# Patient Record
Sex: MALE | Race: BLACK OR AFRICAN AMERICAN | Employment: UNEMPLOYED | URBAN - METROPOLITAN AREA
[De-identification: names, ages, dates, MRNs, and addresses within clinical notes are randomized per-mention and may not be internally consistent; named-entity substitution may affect disease eponyms.]

---

## 2017-08-21 ENCOUNTER — APPOINTMENT (OUTPATIENT)
Dept: GENERAL RADIOLOGY | Age: 62
End: 2017-08-21
Attending: EMERGENCY MEDICINE
Payer: MEDICARE

## 2017-08-21 ENCOUNTER — HOSPITAL ENCOUNTER (EMERGENCY)
Age: 62
Discharge: HOME | End: 2017-08-21
Attending: EMERGENCY MEDICINE | Admitting: EMERGENCY MEDICINE
Payer: MEDICARE

## 2017-08-21 VITALS
DIASTOLIC BLOOD PRESSURE: 78 MMHG | TEMPERATURE: 97.8 F | HEART RATE: 88 BPM | SYSTOLIC BLOOD PRESSURE: 119 MMHG | RESPIRATION RATE: 22 BRPM | OXYGEN SATURATION: 99 % | WEIGHT: 315 LBS | BODY MASS INDEX: 52.48 KG/M2 | HEIGHT: 65 IN

## 2017-08-21 DIAGNOSIS — J40 BRONCHITIS: ICD-10-CM

## 2017-08-21 DIAGNOSIS — R55 SYNCOPE AND COLLAPSE: ICD-10-CM

## 2017-08-21 DIAGNOSIS — R42 DIZZINESS: Primary | ICD-10-CM

## 2017-08-21 LAB
ALBUMIN SERPL-MCNC: 2.8 G/DL (ref 3.5–5)
ALBUMIN/GLOB SERPL: 0.7 {RATIO} (ref 1.1–2.2)
ALP SERPL-CCNC: 133 U/L (ref 45–117)
ALT SERPL-CCNC: 36 U/L (ref 12–78)
ANION GAP SERPL CALC-SCNC: 3 MMOL/L (ref 5–15)
APPEARANCE UR: CLEAR
AST SERPL-CCNC: 19 U/L (ref 15–37)
ATRIAL RATE: 75 BPM
BACTERIA URNS QL MICRO: NEGATIVE /HPF
BASOPHILS # BLD: 0.1 K/UL (ref 0–0.1)
BASOPHILS NFR BLD: 1 % (ref 0–1)
BILIRUB SERPL-MCNC: 0.3 MG/DL (ref 0.2–1)
BILIRUB UR QL: NEGATIVE
BNP SERPL-MCNC: 114 PG/ML (ref 0–125)
BUN SERPL-MCNC: 16 MG/DL (ref 6–20)
BUN/CREAT SERPL: 20 (ref 12–20)
CALCIUM SERPL-MCNC: 8.4 MG/DL (ref 8.5–10.1)
CALCULATED P AXIS, ECG09: 4 DEGREES
CALCULATED R AXIS, ECG10: 18 DEGREES
CALCULATED T AXIS, ECG11: 41 DEGREES
CHLORIDE SERPL-SCNC: 104 MMOL/L (ref 97–108)
CK SERPL-CCNC: 58 U/L (ref 39–308)
CO2 SERPL-SCNC: 31 MMOL/L (ref 21–32)
COLOR UR: ABNORMAL
CREAT SERPL-MCNC: 0.82 MG/DL (ref 0.7–1.3)
D DIMER PPP FEU-MCNC: 0.41 MG/L FEU (ref 0–0.65)
DIAGNOSIS, 93000: NORMAL
DIFFERENTIAL METHOD BLD: ABNORMAL
EOSINOPHIL # BLD: 0.1 K/UL (ref 0–0.4)
EOSINOPHIL NFR BLD: 1 % (ref 0–7)
EPITH CASTS URNS QL MICRO: ABNORMAL /LPF
ERYTHROCYTE [DISTWIDTH] IN BLOOD BY AUTOMATED COUNT: 16.5 % (ref 11.5–14.5)
GLOBULIN SER CALC-MCNC: 4.2 G/DL (ref 2–4)
GLUCOSE SERPL-MCNC: 214 MG/DL (ref 65–100)
GLUCOSE UR STRIP.AUTO-MCNC: NEGATIVE MG/DL
HCT VFR BLD AUTO: 40.1 % (ref 36.6–50.3)
HGB BLD-MCNC: 12.7 G/DL (ref 12.1–17)
HGB UR QL STRIP: NEGATIVE
HYALINE CASTS URNS QL MICRO: ABNORMAL /LPF (ref 0–5)
INR PPP: 1 (ref 0.9–1.1)
KETONES UR QL STRIP.AUTO: NEGATIVE MG/DL
LEUKOCYTE ESTERASE UR QL STRIP.AUTO: NEGATIVE
LYMPHOCYTES # BLD: 2.2 K/UL (ref 0.8–3.5)
LYMPHOCYTES NFR BLD: 27 % (ref 12–49)
MCH RBC QN AUTO: 23 PG (ref 26–34)
MCHC RBC AUTO-ENTMCNC: 31.7 G/DL (ref 30–36.5)
MCV RBC AUTO: 72.5 FL (ref 80–99)
MONOCYTES # BLD: 0.6 K/UL (ref 0–1)
MONOCYTES NFR BLD: 7 % (ref 5–13)
NEUTS SEG # BLD: 5 K/UL (ref 1.8–8)
NEUTS SEG NFR BLD: 64 % (ref 32–75)
NITRITE UR QL STRIP.AUTO: NEGATIVE
P-R INTERVAL, ECG05: 172 MS
PH UR STRIP: 5 [PH] (ref 5–8)
PLATELET # BLD AUTO: 262 K/UL (ref 150–400)
POTASSIUM SERPL-SCNC: 3.9 MMOL/L (ref 3.5–5.1)
PROT SERPL-MCNC: 7 G/DL (ref 6.4–8.2)
PROT UR STRIP-MCNC: ABNORMAL MG/DL
PROTHROMBIN TIME: 10.3 SEC (ref 9–11.1)
Q-T INTERVAL, ECG07: 378 MS
QRS DURATION, ECG06: 92 MS
QTC CALCULATION (BEZET), ECG08: 422 MS
RBC # BLD AUTO: 5.53 M/UL (ref 4.1–5.7)
RBC #/AREA URNS HPF: ABNORMAL /HPF (ref 0–5)
RBC MORPH BLD: ABNORMAL
RBC MORPH BLD: ABNORMAL
SODIUM SERPL-SCNC: 138 MMOL/L (ref 136–145)
SP GR UR REFRACTOMETRY: 1.03 (ref 1–1.03)
TROPONIN I SERPL-MCNC: <0.04 NG/ML
UROBILINOGEN UR QL STRIP.AUTO: 1 EU/DL (ref 0.2–1)
VENTRICULAR RATE, ECG03: 75 BPM
WBC # BLD AUTO: 8 K/UL (ref 4.1–11.1)
WBC URNS QL MICRO: ABNORMAL /HPF (ref 0–4)

## 2017-08-21 PROCEDURE — 96360 HYDRATION IV INFUSION INIT: CPT

## 2017-08-21 PROCEDURE — 93005 ELECTROCARDIOGRAM TRACING: CPT

## 2017-08-21 PROCEDURE — 82550 ASSAY OF CK (CPK): CPT | Performed by: EMERGENCY MEDICINE

## 2017-08-21 PROCEDURE — 36415 COLL VENOUS BLD VENIPUNCTURE: CPT | Performed by: EMERGENCY MEDICINE

## 2017-08-21 PROCEDURE — 74011250636 HC RX REV CODE- 250/636: Performed by: EMERGENCY MEDICINE

## 2017-08-21 PROCEDURE — 85025 COMPLETE CBC W/AUTO DIFF WBC: CPT | Performed by: EMERGENCY MEDICINE

## 2017-08-21 PROCEDURE — 85610 PROTHROMBIN TIME: CPT | Performed by: EMERGENCY MEDICINE

## 2017-08-21 PROCEDURE — 99285 EMERGENCY DEPT VISIT HI MDM: CPT

## 2017-08-21 PROCEDURE — 84484 ASSAY OF TROPONIN QUANT: CPT | Performed by: EMERGENCY MEDICINE

## 2017-08-21 PROCEDURE — 83880 ASSAY OF NATRIURETIC PEPTIDE: CPT | Performed by: EMERGENCY MEDICINE

## 2017-08-21 PROCEDURE — 71010 XR CHEST PORT: CPT

## 2017-08-21 PROCEDURE — 85379 FIBRIN DEGRADATION QUANT: CPT | Performed by: EMERGENCY MEDICINE

## 2017-08-21 PROCEDURE — 80053 COMPREHEN METABOLIC PANEL: CPT | Performed by: EMERGENCY MEDICINE

## 2017-08-21 PROCEDURE — 81001 URINALYSIS AUTO W/SCOPE: CPT | Performed by: EMERGENCY MEDICINE

## 2017-08-21 RX ORDER — HYDROCODONE POLISTIREX AND CHLORPHENIRAMINE POLISTIREX 10; 8 MG/5ML; MG/5ML
5 SUSPENSION, EXTENDED RELEASE ORAL
Qty: 60 ML | Refills: 0 | Status: SHIPPED | OUTPATIENT
Start: 2017-08-21 | End: 2018-01-12

## 2017-08-21 RX ORDER — FUROSEMIDE 10 MG/ML
40 INJECTION INTRAMUSCULAR; INTRAVENOUS
Status: DISCONTINUED | OUTPATIENT
Start: 2017-08-21 | End: 2017-08-21

## 2017-08-21 RX ORDER — ALBUTEROL SULFATE 90 UG/1
2 AEROSOL, METERED RESPIRATORY (INHALATION)
Qty: 1 INHALER | Refills: 0 | Status: SHIPPED | OUTPATIENT
Start: 2017-08-21 | End: 2018-01-25

## 2017-08-21 RX ADMIN — SODIUM CHLORIDE 1000 ML: 900 INJECTION, SOLUTION INTRAVENOUS at 07:02

## 2017-08-21 NOTE — ED NOTES
Assumed care of pt. Bedside rounding completed, with white boards updated with correct information. Pt resting in bed. Call bell within reach. Pt VS within normal limits. Kwabena RN gave report. Updated on patient on need of urine sample. Pt provided urinal and socks. No complaints at this time.

## 2017-08-21 NOTE — ED NOTES
Pt reports he takes oxybutynin at home to prevent urinating all night and thinks that medication is preventing him from providing urinae specimen at this time.

## 2017-08-21 NOTE — ED NOTES
MD Luis Cotrez has reviewed discharge instructions with the patient. The patient verbalized understanding. Pt discharged with written instructions. No further concerns at this time. IV removed.

## 2017-08-21 NOTE — DISCHARGE INSTRUCTIONS
Bronchitis: Care Instructions  Your Care Instructions    Bronchitis is inflammation of the bronchial tubes, which carry air to the lungs. The tubes swell and produce mucus, or phlegm. The mucus and inflamed bronchial tubes make you cough. You may have trouble breathing. Most cases of bronchitis are caused by viruses like those that cause colds. Antibiotics usually do not help and they may be harmful. Bronchitis usually develops rapidly and lasts about 2 to 3 weeks in otherwise healthy people. Follow-up care is a key part of your treatment and safety. Be sure to make and go to all appointments, and call your doctor if you are having problems. It's also a good idea to know your test results and keep a list of the medicines you take. How can you care for yourself at home? · Take all medicines exactly as prescribed. Call your doctor if you think you are having a problem with your medicine. · Get some extra rest.  · Take an over-the-counter pain medicine, such as acetaminophen (Tylenol), ibuprofen (Advil, Motrin), or naproxen (Aleve) to reduce fever and relieve body aches. Read and follow all instructions on the label. · Do not take two or more pain medicines at the same time unless the doctor told you to. Many pain medicines have acetaminophen, which is Tylenol. Too much acetaminophen (Tylenol) can be harmful. · Take an over-the-counter cough medicine that contains dextromethorphan to help quiet a dry, hacking cough so that you can sleep. Avoid cough medicines that have more than one active ingredient. Read and follow all instructions on the label. · Breathe moist air from a humidifier, hot shower, or sink filled with hot water. The heat and moisture will thin mucus so you can cough it out. · Do not smoke. Smoking can make bronchitis worse. If you need help quitting, talk to your doctor about stop-smoking programs and medicines. These can increase your chances of quitting for good.   When should you call for help? Call 911 anytime you think you may need emergency care. For example, call if:  · You have severe trouble breathing. Call your doctor now or seek immediate medical care if:  · You have new or worse trouble breathing. · You cough up dark brown or bloody mucus (sputum). · You have a new or higher fever. · You have a new rash. Watch closely for changes in your health, and be sure to contact your doctor if:  · You cough more deeply or more often, especially if you notice more mucus or a change in the color of your mucus. · You are not getting better as expected. Where can you learn more? Go to http://dean-sergio.info/. Enter H333 in the search box to learn more about \"Bronchitis: Care Instructions. \"  Current as of: March 25, 2017  Content Version: 11.3  © 2162-6584 [employer]. Care instructions adapted under license by [employer] (which disclaims liability or warranty for this information). If you have questions about a medical condition or this instruction, always ask your healthcare professional. [place] [address] any warranty or liability for your use of this information. Dizziness: Care Instructions  Your Care Instructions  Dizziness is the feeling of unsteadiness or fuzziness in your head. It is different than having vertigo, which is a feeling that the room is spinning or that you are moving or falling. It is also different from lightheadedness, which is the feeling that you are about to faint. It can be hard to know what causes dizziness. Some people feel dizzy when they have migraine headaches. Sometimes bouts of flu can make you feel dizzy. Some medical conditions, such as heart problems or high blood pressure, can make you feel dizzy. Many medicines can cause dizziness, including medicines for high blood pressure, pain, or anxiety.   If a medicine causes your symptoms, your doctor may recommend that you stop or change the medicine. If it is a problem with your heart, you may need medicine to help your heart work better. If there is no clear reason for your symptoms, your doctor may suggest watching and waiting for a while to see if the dizziness goes away on its own. Follow-up care is a key part of your treatment and safety. Be sure to make and go to all appointments, and call your doctor if you are having problems. It's also a good idea to know your test results and keep a list of the medicines you take. How can you care for yourself at home? · If your doctor recommends or prescribes medicine, take it exactly as directed. Call your doctor if you think you are having a problem with your medicine. · Do not drive while you feel dizzy. · Try to prevent falls. Steps you can take include:  ¨ Using nonskid mats, adding grab bars near the tub, and using night-lights. ¨ Clearing your home so that walkways are free of anything you might trip on. ¨ Letting family and friends know that you have been feeling dizzy. This will help them know how to help you. When should you call for help? Call 911 anytime you think you may need emergency care. For example, call if:  · You passed out (lost consciousness). · You have dizziness along with symptoms of a heart attack. These may include:  ¨ Chest pain or pressure, or a strange feeling in the chest.  ¨ Sweating. ¨ Shortness of breath. ¨ Nausea or vomiting. ¨ Pain, pressure, or a strange feeling in the back, neck, jaw, or upper belly or in one or both shoulders or arms. ¨ Lightheadedness or sudden weakness. ¨ A fast or irregular heartbeat. · You have symptoms of a stroke. These may include:  ¨ Sudden numbness, tingling, weakness, or loss of movement in your face, arm, or leg, especially on only one side of your body. ¨ Sudden vision changes. ¨ Sudden trouble speaking. ¨ Sudden confusion or trouble understanding simple statements. ¨ Sudden problems with walking or balance.   ¨ A sudden, severe headache that is different from past headaches. Call your doctor now or seek immediate medical care if:  · You feel dizzy and have a fever, headache, or ringing in your ears. · You have new or increased nausea and vomiting. · Your dizziness does not go away or comes back. Watch closely for changes in your health, and be sure to contact your doctor if:  · You do not get better as expected. Where can you learn more? Go to http://dean-sergio.info/. Enter R109 in the search box to learn more about \"Dizziness: Care Instructions. \"  Current as of: March 20, 2017  Content Version: 11.3  © 9649-4047 [employer]. Care instructions adapted under license by [employer] (which disclaims liability or warranty for this information). If you have questions about a medical condition or this instruction, always ask your healthcare professional. [address] any warranty or liability for your use of this information.

## 2017-08-21 NOTE — ED PROVIDER NOTES
HPI Comments: Marko Springer is a 63 y.o. male, pmhx HTN / TIA / DM / A-fib , who presents via EMS to the ED c/o progressively worsening SOB and dyspnea on exertion since yesterday. Pt reports additional mid-sternal, non-radiating CP and cough. He states his sxs are similar with his hx of bronchitis. Pt states he recently finished a Z-pack with no relief of sxs. He further notes having multiple syncopal episodes yesterday. Pt states he hit his head during one of his falls. He notes using 3L home O2 at baseline. Pt denies any recent long distance travel. He specifically denies any recent fever, chills, nausea, vomiting, diarrhea, abd pain, lightheadedness, dizziness, numbness, weakness, tingling, BLE swelling, HA, heart palpitations, urinary sxs, changes in BM, changes in PO intake, melena, hematochezia, or congestion. PCP: Chyna Kaiser MD  Cardiology: Jeanmarie Sidhu    PMHx: Significant for HTN, TIA, seizures, A-fib, syncope, DM, GERD, depression  PSHx: Significant for gastric bypass, orthopedic surgery, cardiac catheterization  Social Hx: -tobacco (former 1998), +EtOH (occasionally), -Illicit Drugs     There are no other complaints, changes, or physical findings at this time. The history is provided by the patient and the EMS personnel.         Past Medical History:   Diagnosis Date    Acquired lactose intolerance 12/1/2010    Atrial fibrillation ([address] 12/1/2010    Atrial septal aneurysm 3/12/2011    Depression     Diabetes ([address] 10/26/01    LOPEZ (dyspnea on exertion) 10/26/01    Essential hypertension     GERD (gastroesophageal reflux disease) 10/26/01    Headache(784.0) 10/26/01    Hypertension     IHSS (idiopathic hypertrophic subaortic stenosis) ([address] 12/1/2010    Morbid obesity ([address] 10/26/01    Other ill-defined conditions(799.89)     sleep apnea    Other ill-defined conditions(799.89)     migraines    Pneumonia     Psychiatric disorder     Depression since 2005    Secondary seizure disorder ([facility] 12/1/2010    Seizures ([facility]     since 2005    Sleep apnea 10/26/01    uses C pap    Stroke [facility])     TIA 2004    Syncope 2/7/2011    asystole (long) when getting Picc line at [facility], was admitted for pneumonia       Past Surgical History:   Procedure Laterality Date    ABDOMEN SURGERY PROC UNLISTED      GASTRIC BYPASS,OBESE<150CM TODD-EN-Y  1/7/02    Dr Benita Luevano    HX CARPAL TUNNEL RELEASE      HX GI      lap gastric bypass 1/7/02    HX GYN      cyst    HX ORTHOPAEDIC      carpal tunnel    HX OTHER SURGICAL      cyst removed from groin         Family History:   Problem Relation Age of Onset    Hypertension Mother     Heart Disease Mother     Stroke Mother     Cancer Mother     Sickle Cell Trait Sister     Hypertension Sister     Bleeding Prob Sister     Diabetes Father     Hypertension Father     Hypertension Brother        Social History     Social History    Marital status:      Spouse name: N/A    Number of children: N/A    Years of education: N/A     Occupational History    Not on file. Social History Main Topics    Smoking status: Former Smoker    Smokeless tobacco: Never Used    Alcohol use Yes      Comment: occas    Drug use: No    Sexual activity: Yes     Partners: Female     Other Topics Concern    Not on file     Social History Narrative         ALLERGIES: Pcn [penicillins]    Review of Systems   Constitutional: Negative for chills and fever. HENT: Negative for congestion, ear pain, rhinorrhea and sore throat. Respiratory: Positive for cough and shortness of breath. Cardiovascular: Positive for chest pain. Negative for palpitations and leg swelling. Gastrointestinal: Negative for abdominal pain, constipation, diarrhea, nausea and vomiting. No melena  No hematochezia   Endocrine: Negative for polyuria. Genitourinary: Negative for dysuria, frequency and hematuria. Neurological: Positive for syncope.  Negative for dizziness, weakness, light-headedness, numbness and headaches. No tingling   All other systems reviewed and are negative. Patient Vitals for the past 12 hrs:   Temp Pulse Resp BP SpO2   08/21/17 0918 - 88 - 119/78 99 %   08/21/17 0800 - 71 22 94/59 98 %   08/21/17 0721 - 84 16 (!) 154/101 99 %   08/21/17 0657 - 89 18 116/80 -   08/21/17 0657 - 88 16 137/90 -   08/21/17 0549 - 87 24 125/89 97 %   08/21/17 0509 97.8 °F (36.6 °C) 86 26 (!) 125/94 94 %       Physical Exam   Nursing note and vitals reviewed.   General appearance - morbidly obese, well appearing, and in no distress  Eyes - pupils equal and reactive, extraocular eye movements intact  ENT - mucous membranes moist, pharynx normal without lesions  Neck - supple, no significant adenopathy; non-tender to palpation  Chest - no wheezes, rhonchorous cough on exam, coarse breath sounds; non-tender to palpation  Heart - normal rate and regular rhythm, S1 and S2 normal, no murmurs noted  Abdomen - soft, nontender, nondistended, no masses or organomegaly  Musculoskeletal - no joint tenderness, deformity or swelling; normal ROM  Extremities - peripheral pulses normal, no pedal edema  Skin - normal coloration and turgor, no rashes  Neurological - alert, oriented x3, normal speech, no focal findings or movement disorder noted  Written by Joy Jose ED Scribe, as dictated by Marcellus Szymanski MD    MDM  Number of Diagnoses or Management Options  Diagnosis management comments:   DDx: COPD, bronchitis, PNA, CHF       Amount and/or Complexity of Data Reviewed  Clinical lab tests: ordered and reviewed  Tests in the radiology section of CPT®: reviewed and ordered  Tests in the medicine section of CPT®: reviewed and ordered  Obtain history from someone other than the patient: yes (EMS)  Review and summarize past medical records: yes  Independent visualization of images, tracings, or specimens: yes    Patient Progress  Patient progress: stable      Procedures    EKG interpretation: (Preliminary) 9016  Rhythm: sinus rhythm and PAC's. Rate (approx.): 75bpm; Axis: normal; Normal WI, QRS, QTc intervals; ST/T wave: no ischemic changes. Written by DENY Woods, as dictated by Pasquale Gongora MD     PROGRESS NOTE:  6:58 AM  Pt reevaluated. Nursing attempted orthostatics with pt; pt noted to drop his BP from 137 to 116 going from a lying to a seated position. Pt c/o dizziness; did not attempt to stand patient. Written by DENY Woodsibe, as dictated by Pasquale Gongora MD    PROGRESS NOTE:  7:15 AM  Pt reevaluated. Pt states Tussionex works best for his cough. Pt informed his INR is not currently therapeutic; recommended pt follow up with his PCP. Written by DENY Woods, as dictated by Pasquale Gongora MD    SIGN OUT:  7:20 AM  Patient's presentation, labs/imaging and plan of care was reviewed with Scar Castellanos MD as part of sign out. They will complete w/u as part of the plan discussed with the patient. Scar Castellanos MD's assistance in completion of this plan is greatly appreciated but it should be noted that I will be the provider of record for this patient. Pasquale Gongora MD     This note is prepared by Phylicia Torres, acting as Scribe for MD Catalina Millan MD  : The scribe's documentation has been prepared under my direction and personally reviewed by me in its entirety. I confirm that the note above accurately reflects all work, treatment, procedures, and medical decision making performed by me. PROGRESS NOTE:  9:15 AM  Spoke with Yolanda Stone RN. She affirms ambulating pt. She states pt reported dizziness but had a steady gait. Pt ambulated with O2 (baseline) with SPO2 98-99%. Will repeat orthostatics. Written by DENY Roseibgustavo, as dictated by Scar Castellanos MD    9:31 AM  Second set of orthostatics resulted negative. Will discharge per Dr. Neris Fritz.       LABORATORY TESTS:  Recent Results (from the past 12 hour(s))   EKG, 12 LEAD, INITIAL    Collection Time: 08/21/17  5:09 AM   Result Value Ref Range    Ventricular Rate 75 BPM    Atrial Rate 75 BPM    P-R Interval 172 ms    QRS Duration 92 ms    Q-T Interval 378 ms    QTC Calculation (Bezet) 422 ms    Calculated P Axis 4 degrees    Calculated R Axis 18 degrees    Calculated T Axis 41 degrees    Diagnosis       Sinus rhythm with premature atrial complexes  Otherwise normal ECG  When compared with ECG of 22-AUG-2014 17:46,  premature atrial complexes are now present  Confirmed by Beatris Vasques ([id number]) on 8/21/2017 8:42:21 AM     CBC WITH AUTOMATED DIFF    Collection Time: 08/21/17  5:14 AM   Result Value Ref Range    WBC 8.0 4.1 - 11.1 K/uL    RBC 5.53 4.10 - 5.70 M/uL    HGB 12.7 12.1 - 17.0 g/dL    HCT 40.1 36.6 - 50.3 %    MCV 72.5 (L) 80.0 - 99.0 FL    MCH 23.0 (L) 26.0 - 34.0 PG    MCHC 31.7 30.0 - 36.5 g/dL    RDW 16.5 (H) 11.5 - 14.5 %    PLATELET 619 247 - 547 K/uL    NEUTROPHILS 64 32 - 75 %    LYMPHOCYTES 27 12 - 49 %    MONOCYTES 7 5 - 13 %    EOSINOPHILS 1 0 - 7 %    BASOPHILS 1 0 - 1 %    ABS. NEUTROPHILS 5.0 1.8 - 8.0 K/UL    ABS. LYMPHOCYTES 2.2 0.8 - 3.5 K/UL    ABS. MONOCYTES 0.6 0.0 - 1.0 K/UL    ABS. EOSINOPHILS 0.1 0.0 - 0.4 K/UL    ABS.  BASOPHILS 0.1 0.0 - 0.1 K/UL    RBC COMMENTS MICROCYTOSIS  1+        RBC COMMENTS HYPOCHROMIA  1+        DF SMEAR SCANNED     PROTHROMBIN TIME + INR    Collection Time: 08/21/17  5:14 AM   Result Value Ref Range    INR 1.0 0.9 - 1.1      Prothrombin time 10.3 9.0 - 52.2 sec   METABOLIC PANEL, COMPREHENSIVE    Collection Time: 08/21/17  5:43 AM   Result Value Ref Range    Sodium 138 136 - 145 mmol/L    Potassium 3.9 3.5 - 5.1 mmol/L    Chloride 104 97 - 108 mmol/L    CO2 31 21 - 32 mmol/L    Anion gap 3 (L) 5 - 15 mmol/L    Glucose 214 (H) 65 - 100 mg/dL    BUN 16 6 - 20 MG/DL    Creatinine 0.82 0.70 - 1.30 MG/DL    BUN/Creatinine ratio 20 12 - 20      GFR est AA >60 >60 ml/min/1.73m2    GFR est non-AA >60 >60 ml/min/1.73m2    Calcium 8.4 (L) 8.5 - 10.1 MG/DL    Bilirubin, total 0.3 0.2 - 1.0 MG/DL    ALT (SGPT) 36 12 - 78 U/L    AST (SGOT) 19 15 - 37 U/L    Alk. phosphatase 133 (H) 45 - 117 U/L    Protein, total 7.0 6.4 - 8.2 g/dL    Albumin 2.8 (L) 3.5 - 5.0 g/dL    Globulin 4.2 (H) 2.0 - 4.0 g/dL    A-G Ratio 0.7 (L) 1.1 - 2.2     TROPONIN I    Collection Time: 08/21/17  5:43 AM   Result Value Ref Range    Troponin-I, Qt. <0.04 <0.05 ng/mL   CK W/ REFLX CKMB    Collection Time: 08/21/17  5:43 AM   Result Value Ref Range    CK 58 39 - 308 U/L   NT-PRO BNP    Collection Time: 08/21/17  5:43 AM   Result Value Ref Range    NT pro- 0 - 125 PG/ML   D DIMER    Collection Time: 08/21/17  5:43 AM   Result Value Ref Range    D-dimer 0.41 0.00 - 0.65 mg/L FEU   URINALYSIS W/ RFLX MICROSCOPIC    Collection Time: 08/21/17  8:56 AM   Result Value Ref Range    Color YELLOW/STRAW      Appearance CLEAR CLEAR      Specific gravity 1.029 1.003 - 1.030      pH (UA) 5.0 5.0 - 8.0      Protein TRACE (A) NEG mg/dL    Glucose NEGATIVE  NEG mg/dL    Ketone NEGATIVE  NEG mg/dL    Bilirubin NEGATIVE  NEG      Blood NEGATIVE  NEG      Urobilinogen 1.0 0.2 - 1.0 EU/dL    Nitrites NEGATIVE  NEG      Leukocyte Esterase NEGATIVE  NEG      WBC 0-4 0 - 4 /hpf    RBC 0-5 0 - 5 /hpf    Epithelial cells FEW FEW /lpf    Bacteria NEGATIVE  NEG /hpf    Hyaline cast 0-2 0 - 5 /lpf       IMAGING RESULTS:     CXR Results  (Last 48 hours)               08/21/17 0542  XR CHEST PORT Final result    Impression:  IMPRESSION:       Mild central pulmonary vascular congestion without overt pulmonary edema. Narrative:  EXAM:  XR CHEST PORT       INDICATION:  Chest pain and shortness of breath for one day. COMPARISON: 6/17/2014       TECHNIQUE: Portable AP upright chest view at 0531 hours       FINDINGS: Cardiac monitoring wires overlie the thorax. The cardiomediastinal   contours are stable.  There is mild central pulmonary vascular congestion. The lungs and pleural spaces are clear. There is no pneumothorax. The bones and   upper abdomen are stable. MEDICATIONS GIVEN:  Medications   sodium chloride 0.9 % bolus infusion 1,000 mL (1,000 mL IntraVENous New Bag 8/21/17 0702)       IMPRESSION:  1. Dizziness    2. Syncope and collapse    3. Bronchitis        PLAN:  1. Current Discharge Medication List      START taking these medications    Details   albuterol (PROVENTIL HFA, VENTOLIN HFA, PROAIR HFA) 90 mcg/actuation inhaler Take 2 Puffs by inhalation every four (4) hours as needed for Wheezing. Qty: 1 Inhaler, Refills: 0      chlorpheniramine-HYDROcodone (TUSSIONEX PENNKINETIC ER) 10-8 mg/5 mL suspension Take 5 mL by mouth every twelve (12) hours as needed for Cough. Max Daily Amount: 10 mL. Qty: 60 mL, Refills: 0           2. Follow-up Information     Follow up With Details Comments Contact Info    [facility] EMERGENCY DEPT  If symptoms worsen [address] [place] [address]  [phone]        Return to ED if worse     DISCHARGE NOTE:  9:50 AM  The patient's results have been reviewed with family and/or caregiver. They verbally convey their understanding and agreement of the patient's signs, symptoms, diagnosis, treatment, and prognosis. They additionally agree to follow up as recommended in the discharge instructions or to return to the Emergency Room should the patient's condition change prior to their follow-up appointment. The family and/or caregiver verbally agrees with the care-plan and all of their questions have been answered. The discharge instructions have also been provided to the them along with educational information regarding the patient's diagnosis and a list of reasons why the patient would want to return to the ER prior to their follow-up appointment should their condition change.     This note is prepared by Ajay Miranda, acting as Scribe for Nazanin Banuelos MD.    Roxie Hicks MD: The scribe's documentation has been prepared under my direction and personally reviewed by me in its entirety. I confirm that the note above accurately reflects all work, treatment, procedures, and medical decision making performed by me. This note will not be viewable in [address]

## 2017-08-21 NOTE — ED NOTES
Pt walked around POD 1/2, complained of dizziness but steady. Pt walked with oxygen which is baseline. Pt SPO2 maintained at 98-99%. Pt in no acute distress.

## 2017-09-27 ENCOUNTER — APPOINTMENT (OUTPATIENT)
Dept: ULTRASOUND IMAGING | Age: 62
End: 2017-09-27
Attending: EMERGENCY MEDICINE
Payer: MEDICARE

## 2017-09-27 ENCOUNTER — HOSPITAL ENCOUNTER (EMERGENCY)
Age: 62
Discharge: HOME | End: 2017-09-27
Attending: EMERGENCY MEDICINE
Payer: MEDICARE

## 2017-09-27 ENCOUNTER — APPOINTMENT (OUTPATIENT)
Dept: GENERAL RADIOLOGY | Age: 62
End: 2017-09-27
Attending: EMERGENCY MEDICINE
Payer: MEDICARE

## 2017-09-27 VITALS
HEART RATE: 81 BPM | WEIGHT: 315 LBS | OXYGEN SATURATION: 96 % | DIASTOLIC BLOOD PRESSURE: 92 MMHG | RESPIRATION RATE: 29 BRPM | TEMPERATURE: 97.9 F | BODY MASS INDEX: 49.44 KG/M2 | HEIGHT: 67 IN | SYSTOLIC BLOOD PRESSURE: 139 MMHG

## 2017-09-27 DIAGNOSIS — K85.90 ACUTE PANCREATITIS, UNSPECIFIED COMPLICATION STATUS, UNSPECIFIED PANCREATITIS TYPE: Primary | ICD-10-CM

## 2017-09-27 DIAGNOSIS — R79.1 SUBTHERAPEUTIC INTERNATIONAL NORMALIZED RATIO (INR): ICD-10-CM

## 2017-09-27 LAB
ALBUMIN SERPL-MCNC: 3.1 G/DL (ref 3.5–5)
ALBUMIN/GLOB SERPL: 0.8 {RATIO} (ref 1.1–2.2)
ALP SERPL-CCNC: 129 U/L (ref 45–117)
ALT SERPL-CCNC: 34 U/L (ref 12–78)
ANION GAP SERPL CALC-SCNC: 7 MMOL/L (ref 5–15)
APTT PPP: 29.6 SEC (ref 22.1–32.5)
AST SERPL-CCNC: 16 U/L (ref 15–37)
ATRIAL RATE: 78 BPM
BASOPHILS # BLD: 0 K/UL (ref 0–0.1)
BASOPHILS NFR BLD: 0 % (ref 0–1)
BILIRUB SERPL-MCNC: 0.4 MG/DL (ref 0.2–1)
BUN SERPL-MCNC: 14 MG/DL (ref 6–20)
BUN/CREAT SERPL: 21 (ref 12–20)
CALCIUM SERPL-MCNC: 8.8 MG/DL (ref 8.5–10.1)
CALCULATED P AXIS, ECG09: 4 DEGREES
CALCULATED R AXIS, ECG10: 7 DEGREES
CALCULATED T AXIS, ECG11: 52 DEGREES
CHLORIDE SERPL-SCNC: 103 MMOL/L (ref 97–108)
CK SERPL-CCNC: 63 U/L (ref 39–308)
CO2 SERPL-SCNC: 27 MMOL/L (ref 21–32)
CREAT SERPL-MCNC: 0.68 MG/DL (ref 0.7–1.3)
D DIMER PPP FEU-MCNC: 0.25 MG/L FEU (ref 0–0.65)
DIAGNOSIS, 93000: NORMAL
EOSINOPHIL # BLD: 0.1 K/UL (ref 0–0.4)
EOSINOPHIL NFR BLD: 1 % (ref 0–7)
ERYTHROCYTE [DISTWIDTH] IN BLOOD BY AUTOMATED COUNT: 15.5 % (ref 11.5–14.5)
GLOBULIN SER CALC-MCNC: 4 G/DL (ref 2–4)
GLUCOSE SERPL-MCNC: 191 MG/DL (ref 65–100)
HCT VFR BLD AUTO: 40.6 % (ref 36.6–50.3)
HGB BLD-MCNC: 12.8 G/DL (ref 12.1–17)
INR PPP: 1 (ref 0.9–1.1)
LIPASE SERPL-CCNC: 1067 U/L (ref 73–393)
LYMPHOCYTES # BLD: 1.9 K/UL (ref 0.8–3.5)
LYMPHOCYTES NFR BLD: 28 % (ref 12–49)
MCH RBC QN AUTO: 22.9 PG (ref 26–34)
MCHC RBC AUTO-ENTMCNC: 31.5 G/DL (ref 30–36.5)
MCV RBC AUTO: 72.5 FL (ref 80–99)
MONOCYTES # BLD: 0.5 K/UL (ref 0–1)
MONOCYTES NFR BLD: 8 % (ref 5–13)
NEUTS SEG # BLD: 4.3 K/UL (ref 1.8–8)
NEUTS SEG NFR BLD: 63 % (ref 32–75)
P-R INTERVAL, ECG05: 178 MS
PLATELET # BLD AUTO: 269 K/UL (ref 150–400)
POTASSIUM SERPL-SCNC: 4.2 MMOL/L (ref 3.5–5.1)
PROT SERPL-MCNC: 7.1 G/DL (ref 6.4–8.2)
PROTHROMBIN TIME: 10.3 SEC (ref 9–11.1)
Q-T INTERVAL, ECG07: 390 MS
QRS DURATION, ECG06: 98 MS
QTC CALCULATION (BEZET), ECG08: 444 MS
RBC # BLD AUTO: 5.6 M/UL (ref 4.1–5.7)
SODIUM SERPL-SCNC: 137 MMOL/L (ref 136–145)
THERAPEUTIC RANGE,PTTT: NORMAL SECS (ref 58–77)
TROPONIN I SERPL-MCNC: <0.04 NG/ML
TROPONIN I SERPL-MCNC: <0.04 NG/ML
VALPROATE SERPL-MCNC: 4 UG/ML (ref 50–100)
VENTRICULAR RATE, ECG03: 78 BPM
WBC # BLD AUTO: 6.7 K/UL (ref 4.1–11.1)

## 2017-09-27 PROCEDURE — 83690 ASSAY OF LIPASE: CPT | Performed by: EMERGENCY MEDICINE

## 2017-09-27 PROCEDURE — 36415 COLL VENOUS BLD VENIPUNCTURE: CPT | Performed by: EMERGENCY MEDICINE

## 2017-09-27 PROCEDURE — 93005 ELECTROCARDIOGRAM TRACING: CPT

## 2017-09-27 PROCEDURE — 85025 COMPLETE CBC W/AUTO DIFF WBC: CPT | Performed by: EMERGENCY MEDICINE

## 2017-09-27 PROCEDURE — 71010 XR CHEST PORT: CPT

## 2017-09-27 PROCEDURE — 96374 THER/PROPH/DIAG INJ IV PUSH: CPT

## 2017-09-27 PROCEDURE — 80053 COMPREHEN METABOLIC PANEL: CPT | Performed by: EMERGENCY MEDICINE

## 2017-09-27 PROCEDURE — 80164 ASSAY DIPROPYLACETIC ACD TOT: CPT | Performed by: EMERGENCY MEDICINE

## 2017-09-27 PROCEDURE — 85379 FIBRIN DEGRADATION QUANT: CPT | Performed by: EMERGENCY MEDICINE

## 2017-09-27 PROCEDURE — 82550 ASSAY OF CK (CPK): CPT | Performed by: EMERGENCY MEDICINE

## 2017-09-27 PROCEDURE — 85730 THROMBOPLASTIN TIME PARTIAL: CPT | Performed by: EMERGENCY MEDICINE

## 2017-09-27 PROCEDURE — 76705 ECHO EXAM OF ABDOMEN: CPT

## 2017-09-27 PROCEDURE — 74011250636 HC RX REV CODE- 250/636: Performed by: EMERGENCY MEDICINE

## 2017-09-27 PROCEDURE — 85610 PROTHROMBIN TIME: CPT | Performed by: EMERGENCY MEDICINE

## 2017-09-27 PROCEDURE — 77010033678 HC OXYGEN DAILY

## 2017-09-27 PROCEDURE — 74011250637 HC RX REV CODE- 250/637: Performed by: EMERGENCY MEDICINE

## 2017-09-27 PROCEDURE — 99285 EMERGENCY DEPT VISIT HI MDM: CPT

## 2017-09-27 PROCEDURE — 84484 ASSAY OF TROPONIN QUANT: CPT | Performed by: EMERGENCY MEDICINE

## 2017-09-27 RX ORDER — RANITIDINE 150 MG/1
150 TABLET, FILM COATED ORAL
Qty: 14 TAB | Refills: 0 | Status: SHIPPED | OUTPATIENT
Start: 2017-09-27 | End: 2017-10-11

## 2017-09-27 RX ORDER — OXYCODONE AND ACETAMINOPHEN 5; 325 MG/1; MG/1
1 TABLET ORAL
Status: COMPLETED | OUTPATIENT
Start: 2017-09-27 | End: 2017-09-27

## 2017-09-27 RX ORDER — OXYCODONE AND ACETAMINOPHEN 5; 325 MG/1; MG/1
1 TABLET ORAL
Qty: 10 TAB | Refills: 0 | Status: SHIPPED | OUTPATIENT
Start: 2017-09-27 | End: 2018-01-25

## 2017-09-27 RX ORDER — MORPHINE SULFATE 2 MG/ML
4 INJECTION, SOLUTION INTRAMUSCULAR; INTRAVENOUS
Status: COMPLETED | OUTPATIENT
Start: 2017-09-27 | End: 2017-09-27

## 2017-09-27 RX ADMIN — MORPHINE SULFATE 4 MG: 2 INJECTION, SOLUTION INTRAMUSCULAR; INTRAVENOUS at 14:45

## 2017-09-27 RX ADMIN — OXYCODONE HYDROCHLORIDE AND ACETAMINOPHEN 1 TABLET: 5; 325 TABLET ORAL at 16:54

## 2017-09-27 NOTE — ED PROVIDER NOTES
HPI Comments: Blu Evans is a 63 y.o. male with PMhx significant for GERD, COPD, seizures, migraines, low INR, and atrial fibrillation who presents via EMS to the ED with cc of sharp chest pain/pressure with a migraine and shortness of breath which began shortly PTA. Pt was at his PCP's office sitting down and speaking to his doctor when his symptoms onset. The chest pain is mid to low sternal and radiates to his back. His left fingers are tingling/numb. The pain is exacerbated with deep breaths. Pt was given nitroglycerin before EMS arrived with no relief. Pt is on oxygen at home. Pt is on Depakote for his migraines, which trigger his seizures. He is also on Coumadin for a fib. He underwent a nuclear stress test 2 months ago. Pt denies recent long travel, recent surgeries, liver disease, recent Viagra use, stent or pacemaker placement. Pt specifically denies diarrhea, fever, cough, congestion, LOC, nausea, or vomiting. Social Hx: former (used to smoke 1 ppd 15 years ago) Tobacco, + (occasional wine) EtOH, - Illicit Drugs    PCP: Sandrita Pradhan MD  Cardiology: Dr. Corey Bailey at [facility]    There are no other complaints, changes or physical findings at this time. The history is provided by the patient. No  was used.       Past Medical History:   Diagnosis Date    Acquired lactose intolerance 12/1/2010    Atrial fibrillation ([address] 12/1/2010    Atrial septal aneurysm 3/12/2011    Depression     Diabetes ([address] 10/26/01    LOPEZ (dyspnea on exertion) 10/26/01    Essential hypertension     GERD (gastroesophageal reflux disease) 10/26/01    Headache 10/26/01    Hypertension     IHSS (idiopathic hypertrophic subaortic stenosis) ([address] 12/1/2010    Morbid obesity ([address] 10/26/01    Other ill-defined conditions     sleep apnea    Other ill-defined conditions     migraines    Pneumonia     Psychiatric disorder     Depression since 2005    Secondary seizure disorder ([address] 12/1/2010    Seizures ([facility]     since 2005    Sleep apnea 10/26/01    uses C pap    Stroke [facility])     TIA 2004    Syncope 2/7/2011    asystole (long) when getting Pic[facility] line at [facility], was admitted for pneumonia     Past Surgical History:   Procedure Laterality Date    ABDOMEN SURGERY PROC UNLISTED      GASTRIC BYPASS,OBESE<150CM TODD-EN-Y  1/7/02    Dr Bashir Bernabe    HX CARPAL TUNNEL RELEASE      HX GI      lap gastric bypass 1/7/02    HX GYN      cyst    HX ORTHOPAEDIC      carpal tunnel    HX OTHER SURGICAL      cyst removed from groin    HX OTHER SURGICAL      hernia repair     Family History:   Problem Relation Age of Onset    Hypertension Mother     Heart Disease Mother     Stroke Mother     Cancer Mother     Diabetes Father     Hypertension Father     Sickle Cell Trait Sister     Hypertension Sister     Bleeding Prob Sister     Hypertension Brother      Social History     Social History    Marital status:      Spouse name: N/A    Number of children: N/A    Years of education: N/A     Occupational History    Not on file. Social History Main Topics    Smoking status: Former Smoker    Smokeless tobacco: Never Used    Alcohol use 1.8 oz/week     3 Glasses of wine per week      Comment: occas    Drug use: No    Sexual activity: Yes     Partners: Female     Other Topics Concern    Not on file     Social History Narrative     ALLERGIES: Pcn [penicillins]    Review of Systems   Constitutional: Negative for chills and fever. HENT: Negative for congestion. Eyes: Negative for visual disturbance. Respiratory: Positive for shortness of breath. Negative for chest tightness. Cardiovascular: Positive for chest pain. Negative for leg swelling. Gastrointestinal: Negative for abdominal pain, nausea and vomiting. Endocrine: Negative for polyuria. Genitourinary: Negative for dysuria and frequency. Musculoskeletal: Negative for myalgias. Skin: Negative for color change. Allergic/Immunologic: Negative for immunocompromised state. Neurological: Positive for numbness (left fingers and leg) and headaches. Vitals:    09/27/17 1722 09/27/17 1725 09/27/17 1726 09/27/17 1730   BP:   144/90 (!) 139/92   Pulse: 87 81 81    Resp: 19 23 21 29   Temp:       SpO2: 96% 98% 98% 96%   Weight:       Height:              Physical Exam   Nursing note and vitals reviewed. General appearance: non-toxic, NAD  Eyes: PERRL, EOMI, conjunctiva normal, anicteric sclera  HEENT: mucous membranes moist, oropharynx is clear  Pulmonary: clear to auscultation bilaterally  Cardiac: normal rate and regular rhythm, no murmurs, gallops, or rubs, 2+DP pulses, 2+ radial pulses  Abdomen: soft, mild epigastric tenderness to palpation, nondistended, bowel sounds present  MSK: no pre-tibial edema, tender to palpation of the left anterior chest wall, reproducible pain with torso rotation  Neuro: Alert, answers questions appropriately, CN II-XII intact, VFF,  symmetric, strength intact all 4 extremities  Skin: capillary refill brisk    MDM  Number of Diagnoses or Management Options  Acute pancreatitis, unspecified complication status, unspecified pancreatitis type:   Subtherapeutic international normalized ratio (INR):   Diagnosis management comments: DDx: Chest wall strain, pleurisy, bronchitis, pneumonia, PE, pancreatitis, gastritis    Lipase elevated, US negative; pt admits to drinking wine. Suspect ETOH-induced. CE negative. No pain with ambulation. Lower suspicion for ACS, especially in light of elevated lipase and location of pain. Advised pt on clear diet & advance as tolerated. Outpatient f/u. Lower suspicion for PE and D dimer negative.         Amount and/or Complexity of Data Reviewed  Clinical lab tests: ordered and reviewed  Tests in the radiology section of CPT®: ordered and reviewed  Tests in the medicine section of CPT®: ordered and reviewed  Review and summarize past medical records: yes  Independent visualization of images, tracings, or specimens: yes    Patient Progress  Patient progress: stable    ED Course     Procedures     EKG interpretation: 12:56  Rhythm: sinus rhythm with marked sinus arrhythmia. Rate (approx.): 78; Axis: normal; P wave: normal; QRS interval: normal ; ST/T wave: no ST elevation, no ST depression;   DE interval 178 ms, QRS 98 ms,  ms, QTc 444 ms. Written by Ravi Razo ED Scribe, as dictated by Emilie Pope MD.    LABORATORY TESTS:  Recent Results (from the past 12 hour(s))   EKG, 12 LEAD, INITIAL    Collection Time: 09/27/17 12:56 PM   Result Value Ref Range    Ventricular Rate 78 BPM    Atrial Rate 78 BPM    P-R Interval 178 ms    QRS Duration 98 ms    Q-T Interval 390 ms    QTC Calculation (Bezet) 444 ms    Calculated P Axis 4 degrees    Calculated R Axis 7 degrees    Calculated T Axis 52 degrees    Diagnosis       Sinus rhythm with marked sinus arrhythmia  Otherwise normal ECG  When compared with ECG of 21-AUG-2017 05:09,  premature atrial complexes are no longer present     CBC WITH AUTOMATED DIFF    Collection Time: 09/27/17  1:06 PM   Result Value Ref Range    WBC 6.7 4.1 - 11.1 K/uL    RBC 5.60 4.10 - 5.70 M/uL    HGB 12.8 12.1 - 17.0 g/dL    HCT 40.6 36.6 - 50.3 %    MCV 72.5 (L) 80.0 - 99.0 FL    MCH 22.9 (L) 26.0 - 34.0 PG    MCHC 31.5 30.0 - 36.5 g/dL    RDW 15.5 (H) 11.5 - 14.5 %    PLATELET 682 116 - 487 K/uL    NEUTROPHILS 63 32 - 75 %    LYMPHOCYTES 28 12 - 49 %    MONOCYTES 8 5 - 13 %    EOSINOPHILS 1 0 - 7 %    BASOPHILS 0 0 - 1 %    ABS. NEUTROPHILS 4.3 1.8 - 8.0 K/UL    ABS. LYMPHOCYTES 1.9 0.8 - 3.5 K/UL    ABS. MONOCYTES 0.5 0.0 - 1.0 K/UL    ABS. EOSINOPHILS 0.1 0.0 - 0.4 K/UL    ABS.  BASOPHILS 0.0 0.0 - 0.1 K/UL   METABOLIC PANEL, COMPREHENSIVE    Collection Time: 09/27/17  1:06 PM   Result Value Ref Range    Sodium 137 136 - 145 mmol/L    Potassium 4.2 3.5 - 5.1 mmol/L    Chloride 103 97 - 108 mmol/L    CO2 27 21 - 32 mmol/L Anion gap 7 5 - 15 mmol/L    Glucose 191 (H) 65 - 100 mg/dL    BUN 14 6 - 20 MG/DL    Creatinine 0.68 (L) 0.70 - 1.30 MG/DL    BUN/Creatinine ratio 21 (H) 12 - 20      GFR est AA >60 >60 ml/min/1.73m2    GFR est non-AA >60 >60 ml/min/1.73m2    Calcium 8.8 8.5 - 10.1 MG/DL    Bilirubin, total 0.4 0.2 - 1.0 MG/DL    ALT (SGPT) 34 12 - 78 U/L    AST (SGOT) 16 15 - 37 U/L    Alk. phosphatase 129 (H) 45 - 117 U/L    Protein, total 7.1 6.4 - 8.2 g/dL    Albumin 3.1 (L) 3.5 - 5.0 g/dL    Globulin 4.0 2.0 - 4.0 g/dL    A-G Ratio 0.8 (L) 1.1 - 2.2     CK W/ REFLX CKMB    Collection Time: 09/27/17  1:06 PM   Result Value Ref Range    CK 63 39 - 308 U/L   PROTHROMBIN TIME + INR    Collection Time: 09/27/17  1:06 PM   Result Value Ref Range    INR 1.0 0.9 - 1.1      Prothrombin time 10.3 9.0 - 11.1 sec   PTT    Collection Time: 09/27/17  1:06 PM   Result Value Ref Range    aPTT 29.6 22.1 - 32.5 sec    aPTT, therapeutic range     58.0 - 77.0 SECS   TROPONIN I    Collection Time: 09/27/17  1:06 PM   Result Value Ref Range    Troponin-I, Qt. <0.04 <0.05 ng/mL   LIPASE    Collection Time: 09/27/17  1:06 PM   Result Value Ref Range    Lipase 1067 (H) 73 - 393 U/L   D DIMER    Collection Time: 09/27/17  1:06 PM   Result Value Ref Range    D-dimer 0.25 0.00 - 0.65 mg/L FEU   VALPROIC ACID    Collection Time: 09/27/17  2:33 PM   Result Value Ref Range    Valproic acid 4 (L) 50 - 100 ug/ml   TROPONIN I    Collection Time: 09/27/17  4:36 PM   Result Value Ref Range    Troponin-I, Qt. <0.04 <0.05 ng/mL     IMAGING RESULTS:    ULTRASOUND OF THE RIGHT UPPER QUADRANT     INDICATION: pancreatitis, chest/abd pain, eval gallbladder     COMPARISON: None.     TECHNIQUE:  Sonography of the right upper quadrant was performed.      FINDINGS:     GALLBLADDER: No gallstones, wall thickening, or pericholecystic fluid. COMMON DUCT: 0. 7 cm in diameter. The duct is upper normal caliber. LIVER: Normal echogenicity.  No focal hepatic mass or intrahepatic biliary  dilatation is shown. PANCREAS: The visualized portions of the pancreas are normal.   RIGHT KIDNEY: 11.8 cm in length. No hydronephrosis, shadowing calculus, or  contour-deforming renal mass.        IMPRESSION  IMPRESSION:   Unremarkable right upper quadrant ultrasound.         CXR Results  (Last 48 hours)               09/27/17 1347  XR CHEST PORT Final result    Impression:  IMPRESSION: Mild central congestion               Narrative:  EXAM:  XR CHEST PORT       INDICATION:  chest pain that radiated to the back, beginning today. COMPARISON:  8/21/2017       FINDINGS: A portable AP radiograph of the chest was obtained at 1335 hours. The   patient is on a cardiac monitor. There is a mild central congestion. The   cardiac and mediastinal contours and pulmonary vascularity are normal.  The   bones and soft tissues are grossly within normal limits. MEDICATIONS GIVEN:  Medications   morphine injection 4 mg (4 mg IntraVENous Given 9/27/17 1445)   oxyCODONE-acetaminophen (PERCOCET) 5-325 mg per tablet 1 Tab (1 Tab Oral Given 9/27/17 1654)     IMPRESSION:  1. Acute pancreatitis, unspecified complication status, unspecified pancreatitis type    2. Subtherapeutic international normalized ratio (INR)      PLAN:  1. Discharge   Discharge Medication List as of 9/27/2017  5:44 PM      START taking these medications    Details   raNITIdine (ZANTAC) 150 mg tablet Take 1 Tab by mouth nightly for 14 days. , Normal, Disp-14 Tab, R-0         CONTINUE these medications which have CHANGED    Details   oxyCODONE-acetaminophen (PERCOCET) 5-325 mg per tablet Take 1 Tab by mouth every six (6) hours as needed for Pain. Max Daily Amount: 4 Tabs.  Indications: causes drowsiness;do not drink,drive, or use machinery while taking, Print, Disp-10 Tab, R-0         CONTINUE these medications which have NOT CHANGED    Details   albuterol (PROVENTIL HFA, VENTOLIN HFA, PROAIR HFA) 90 mcg/actuation inhaler Take 2 Puffs by inhalation every four (4) hours as needed for Wheezing., Print, Disp-1 Inhaler, R-0      chlorpheniramine-HYDROcodone (TUSSIONEX PENNKINETIC ER) 10-8 mg/5 mL suspension Take 5 mL by mouth every twelve (12) hours as needed for Cough. Max Daily Amount: 10 mL., Print, Disp-60 mL, R-0      divalproex DR (DEPAKOTE) 500 mg tablet Take 1 tablet by mouth two (2) times a day., Normal, Disp-90 tablet, R-3      metoprolol (LOPRESSOR) 100 mg tablet Take 1 tablet by mouth two (2) times a day., No Print, Disp-1 tablet, R-0      simethicone (MYLICON) 80 mg chewable tablet Take 1 tablet by mouth four (4) times daily as needed. , Print, Disp-30 tablet, R-0      omeprazole (PRILOSEC) 20 mg capsule Take 1 capsule by mouth daily. , Print, Disp-30 capsule, R-0      aspirin delayed-release 81 mg tablet Take 81 mg by mouth daily. , Historical Med      WARFARIN SODIUM (WARFARIN PO) Take 30 mg by mouth daily. , Historical Med      LISINOPRIL PO Take 20 mg by mouth. Dose Unknown, Historical Med      citalopram (CELEXA) 20 mg tablet Take 0.5 Tabs by mouth daily. , Normal, Disp-30 Tab, R-3      alprazolam (XANAX) 2 mg tablet Take 2 mg by mouth two (2) times a day., Historical Med           2. Follow-up Information     Follow up With Details Comments Contact Info    Yojana Martinez MD Schedule an appointment as soon as possible for a visit in 2 days  [place]  [phone]      [facility] EMERGENCY DEPT Go in 1 day If symptoms worsen [facility] an appointment as soon as possible for a visit in 1 week FOR FOLLOW UP AND EVALUATION         Return to ED if worse     Discharge Note:  5:38 PM  The patient has been re-evaluated and is ready for discharge. Reviewed available results with patient. Counseled patient on diagnosis and care plan. Patient has expressed understanding, and all questions have been answered.  Patient agrees with plan and agrees to follow up as recommended, or return to the ED if their symptoms worsen. Discharge instructions have been provided and explained to the patient, along with reasons to return to the ED. Attestation: This note is prepared by Maxime Bennett, acting as Scribe for MD Meagan Parks MD: The scribe's documentation has been prepared under my direction and personally reviewed by me in its entirety. I confirm that the note above accurately reflects all work, treatment, procedures, and medical decision making performed by me.

## 2017-09-27 NOTE — ED NOTES
.Discharge instructions reviewed with patient and given to pt per MD bishop. Pt able to return/verbalize discharge instructions. Copy of discharge instructions given. RX given to pt. Pt condition stable, no further complaints. Pt out of ER, accompanied by self. Assisted to car via wheelchair by ED staff, pt had own oxygen tank. Family to drive patient home. Patient out of ED prior to obtaining discharge vitals. Belongings & discharge paperwork out of ED with patient.

## 2017-09-27 NOTE — DISCHARGE INSTRUCTIONS
Pancreatitis: Care Instructions  Your Care Instructions    The pancreas is an organ behind the stomach. It makes hormones and enzymes to help your body digest food. But if these enzymes attack the pancreas, it can get inflamed. This is called pancreatitis. Most cases are caused by gallstones or by heavy alcohol use. If you take care of yourself at home, it will help you get better. It will also help you avoid more problems with your pancreas. Follow-up care is a key part of your treatment and safety. Be sure to make and go to all appointments, and call your doctor if you are having problems. It's also a good idea to know your test results and keep a list of the medicines you take. How can you care for yourself at home? · Drink clear liquids and eat bland foods until you feel better. [place] foods include rice, dry toast, and crackers. They also include bananas and applesauce. · Eat a low-fat diet until your doctor says your pancreas is healed. · Do not drink alcohol. Tell your doctor if you need help to quit. Counseling, support groups, and sometimes medicines can help you stay sober. · Be safe with medicines. Read and follow all instructions on the label. ¨ If the doctor gave you a prescription medicine for pain, take it as prescribed. ¨ If you are not taking a prescription pain medicine, ask your doctor if you can take an over-the-counter medicine. · If your doctor prescribed antibiotics, take them as directed. Do not stop taking them just because you feel better. You need to take the full course of antibiotics. · Get extra rest until you feel better. To prevent future problems with your pancreas  · Do not drink alcohol. · Tell your doctors and pharmacist that you've had pancreatitis. They can help you avoid medicines that may cause this problem again. When should you call for help? Call your doctor now or seek immediate medical care if:  · You have new or severe belly pain.   · You have a new or higher fever. · You can't keep fluid or medicines down. Watch closely for changes in your health, and be sure to contact your doctor if:  · The symptoms you had when you first started feeling sick come back. · You do not get better as expected. · You need help to stop drinking alcohol. Where can you learn more? Go to http://dean-sergio.info/. Enter Q547 in the search box to learn more about \"Pancreatitis: Care Instructions. \"  Current as of: August 9, 2016  Content Version: 11.3  © 1350-6324 [employer]. Care instructions adapted under license by [employer] (which disclaims liability or warranty for this information). If you have questions about a medical condition or this instruction, always ask your healthcare professional. [address] any warranty or liability for your use of this information. Chest Pain: Care Instructions  Your Care Instructions  There are many things that can cause chest pain. Some are not serious and will get better on their own in a few days. But some kinds of chest pain need more testing and treatment. Your doctor may have recommended a follow-up visit in the next 8 to 12 hours. If you are not getting better, you may need more tests or treatment. Even though your doctor has released you, you still need to watch for any problems. The doctor carefully checked you, but sometimes problems can develop later. If you have new symptoms or if your symptoms do not get better, get medical care right away. If you have worse or different chest pain or pressure that lasts more than 5 minutes or you passed out (lost consciousness), call 911 or seek other emergency help right away. A medical visit is only one step in your treatment. Even if you feel better, you still need to do what your doctor recommends, such as going to all suggested follow-up appointments and taking medicines exactly as directed.  This will help you recover and help prevent future problems. How can you care for yourself at home? · Rest until you feel better. · Take your medicine exactly as prescribed. Call your doctor if you think you are having a problem with your medicine. · Do not drive after taking a prescription pain medicine. When should you call for help? Call 911 if:  · You passed out (lost consciousness). · You have severe difficulty breathing. · You have symptoms of a heart attack. These may include:  ¨ Chest pain or pressure, or a strange feeling in your chest.  ¨ Sweating. ¨ Shortness of breath. ¨ Nausea or vomiting. ¨ Pain, pressure, or a strange feeling in your back, neck, jaw, or upper belly or in one or both shoulders or arms. ¨ Lightheadedness or sudden weakness. ¨ A fast or irregular heartbeat. After you call 911, the  may tell you to chew 1 adult-strength or 2 to 4 low-dose aspirin. Wait for an ambulance. Do not try to drive yourself. Call your doctor today if:  · You have any trouble breathing. · Your chest pain gets worse. · You are dizzy or lightheaded, or you feel like you may faint. · You are not getting better as expected. · You are having new or different chest pain. Where can you learn more? Go to http://dean-sergio.info/. Enter A120 in the search box to learn more about \"Chest Pain: Care Instructions. \"  Current as of: March 20, 2017  Content Version: 11.3  © 4054-2518 [employer]. Care instructions adapted under license by [employer] (which disclaims liability or warranty for this information). If you have questions about a medical condition or this instruction, always ask your healthcare professional. [address] any warranty or liability for your use of this information.

## 2017-09-27 NOTE — ED NOTES
Bedside and Verbal shift change report given to JESÚS Fitzgerald (oncoming nurse) by Jamir Delaney (offgoing nurse). Report included the following information SBAR, ED Summary, Intake/Output, MAR and Recent Results. Monitor x 0. Call bell in reach. Bed in lowest position, with side rails up x 2. Pt updated on plan of care. Resting at this time. 2L oxygen NC on at this time. Awaiting ride.

## 2017-09-27 NOTE — ED NOTES
Patient arrived by EMS, Patient given SL NTG at the clinic, and 324 asa via EMS. patient was sitting talking to his doctor at a clinic when he started having pain in the center of the chest that radiates to the back. Patient was at his doctor for a checkup today. States he had pneumonia a month ago.

## 2017-12-20 ENCOUNTER — HOSPITAL ENCOUNTER (EMERGENCY)
Age: 62
Discharge: STILL ADMITTED OR ADMINISTRATIVE | End: 2017-12-20
Attending: EMERGENCY MEDICINE
Payer: MEDICARE

## 2017-12-20 ENCOUNTER — HOSPITAL ENCOUNTER (OUTPATIENT)
Dept: GENERAL RADIOLOGY | Age: 62
Discharge: HOME | End: 2017-12-20
Payer: MEDICARE

## 2017-12-20 VITALS — HEART RATE: 98 BPM

## 2017-12-20 DIAGNOSIS — M54.50 CHRONIC LOW BACK PAIN: ICD-10-CM

## 2017-12-20 DIAGNOSIS — G89.29 CHRONIC LOW BACK PAIN: ICD-10-CM

## 2017-12-20 PROCEDURE — 72100 X-RAY EXAM L-S SPINE 2/3 VWS: CPT

## 2017-12-20 PROCEDURE — 75810000275 HC EMERGENCY DEPT VISIT NO LEVEL OF CARE

## 2018-01-12 ENCOUNTER — APPOINTMENT (OUTPATIENT)
Dept: GENERAL RADIOLOGY | Age: 63
End: 2018-01-12
Attending: EMERGENCY MEDICINE
Payer: MEDICARE

## 2018-01-12 ENCOUNTER — HOSPITAL ENCOUNTER (EMERGENCY)
Age: 63
Discharge: HOME | End: 2018-01-12
Attending: EMERGENCY MEDICINE
Payer: MEDICARE

## 2018-01-12 ENCOUNTER — OFFICE VISIT (OUTPATIENT)
Dept: FAMILY MEDICINE CLINIC | Age: 63
End: 2018-01-12

## 2018-01-12 VITALS
TEMPERATURE: 98.1 F | OXYGEN SATURATION: 93 % | HEART RATE: 84 BPM | RESPIRATION RATE: 22 BRPM | WEIGHT: 315 LBS | HEIGHT: 67 IN | SYSTOLIC BLOOD PRESSURE: 142 MMHG | BODY MASS INDEX: 49.44 KG/M2 | DIASTOLIC BLOOD PRESSURE: 85 MMHG

## 2018-01-12 VITALS
BODY MASS INDEX: 49.44 KG/M2 | TEMPERATURE: 97.4 F | SYSTOLIC BLOOD PRESSURE: 129 MMHG | HEART RATE: 89 BPM | RESPIRATION RATE: 18 BRPM | OXYGEN SATURATION: 96 % | WEIGHT: 315 LBS | HEIGHT: 67 IN | DIASTOLIC BLOOD PRESSURE: 89 MMHG

## 2018-01-12 DIAGNOSIS — E11.21 CONTROLLED TYPE 2 DIABETES MELLITUS WITH DIABETIC NEPHROPATHY, WITHOUT LONG-TERM CURRENT USE OF INSULIN (HCC): ICD-10-CM

## 2018-01-12 DIAGNOSIS — R07.9 CHEST PAIN AT REST: ICD-10-CM

## 2018-01-12 DIAGNOSIS — I10 HYPERTENSION, WELL CONTROLLED: ICD-10-CM

## 2018-01-12 DIAGNOSIS — E66.01 MORBID OBESITY (HCC): Primary | ICD-10-CM

## 2018-01-12 DIAGNOSIS — R05.9 COUGH: Primary | ICD-10-CM

## 2018-01-12 PROBLEM — F32.A ANXIETY AND DEPRESSION: Status: ACTIVE | Noted: 2018-01-12

## 2018-01-12 PROBLEM — F41.9 ANXIETY AND DEPRESSION: Status: ACTIVE | Noted: 2018-01-12

## 2018-01-12 PROBLEM — G40.909 SEIZURE DISORDER (HCC): Status: ACTIVE | Noted: 2018-01-12

## 2018-01-12 PROBLEM — F33.9 RECURRENT DEPRESSION (HCC): Status: ACTIVE | Noted: 2018-01-12

## 2018-01-12 LAB
ALBUMIN SERPL-MCNC: 3.3 G/DL (ref 3.5–5)
ALBUMIN/GLOB SERPL: 0.8 {RATIO} (ref 1.1–2.2)
ALP SERPL-CCNC: 162 U/L (ref 45–117)
ALT SERPL-CCNC: 22 U/L (ref 12–78)
ANION GAP SERPL CALC-SCNC: 5 MMOL/L (ref 5–15)
AST SERPL-CCNC: 17 U/L (ref 15–37)
ATRIAL RATE: 84 BPM
BASOPHILS # BLD: 0.1 K/UL (ref 0–0.1)
BASOPHILS NFR BLD: 1 % (ref 0–1)
BILIRUB SERPL-MCNC: 0.4 MG/DL (ref 0.2–1)
BNP SERPL-MCNC: 95 PG/ML (ref 0–125)
BUN SERPL-MCNC: 11 MG/DL (ref 6–20)
BUN/CREAT SERPL: 13 (ref 12–20)
CALCIUM SERPL-MCNC: 9.1 MG/DL (ref 8.5–10.1)
CALCULATED P AXIS, ECG09: 29 DEGREES
CALCULATED R AXIS, ECG10: 49 DEGREES
CALCULATED T AXIS, ECG11: 53 DEGREES
CHLORIDE SERPL-SCNC: 103 MMOL/L (ref 97–108)
CK SERPL-CCNC: 85 U/L (ref 39–308)
CO2 SERPL-SCNC: 29 MMOL/L (ref 21–32)
CREAT SERPL-MCNC: 0.88 MG/DL (ref 0.7–1.3)
DIAGNOSIS, 93000: NORMAL
DIFFERENTIAL METHOD BLD: ABNORMAL
EOSINOPHIL # BLD: 0.1 K/UL (ref 0–0.4)
EOSINOPHIL NFR BLD: 2 % (ref 0–7)
ERYTHROCYTE [DISTWIDTH] IN BLOOD BY AUTOMATED COUNT: 14.9 % (ref 11.5–14.5)
GLOBULIN SER CALC-MCNC: 4.3 G/DL (ref 2–4)
GLUCOSE SERPL-MCNC: 274 MG/DL (ref 65–100)
HCT VFR BLD AUTO: 42 % (ref 36.6–50.3)
HGB BLD-MCNC: 13.6 G/DL (ref 12.1–17)
LYMPHOCYTES # BLD: 1.8 K/UL (ref 0.8–3.5)
LYMPHOCYTES NFR BLD: 30 % (ref 12–49)
MCH RBC QN AUTO: 23 PG (ref 26–34)
MCHC RBC AUTO-ENTMCNC: 32.4 G/DL (ref 30–36.5)
MCV RBC AUTO: 71.1 FL (ref 80–99)
MONOCYTES # BLD: 0.5 K/UL (ref 0–1)
MONOCYTES NFR BLD: 8 % (ref 5–13)
NEUTS SEG # BLD: 3.6 K/UL (ref 1.8–8)
NEUTS SEG NFR BLD: 59 % (ref 32–75)
P-R INTERVAL, ECG05: 164 MS
PLATELET # BLD AUTO: 222 K/UL (ref 150–400)
POTASSIUM SERPL-SCNC: 4 MMOL/L (ref 3.5–5.1)
PROT SERPL-MCNC: 7.6 G/DL (ref 6.4–8.2)
Q-T INTERVAL, ECG07: 388 MS
QRS DURATION, ECG06: 88 MS
QTC CALCULATION (BEZET), ECG08: 458 MS
RBC # BLD AUTO: 5.91 M/UL (ref 4.1–5.7)
RBC MORPH BLD: ABNORMAL
SODIUM SERPL-SCNC: 137 MMOL/L (ref 136–145)
TROPONIN I SERPL-MCNC: <0.04 NG/ML
VENTRICULAR RATE, ECG03: 84 BPM
WBC # BLD AUTO: 6.1 K/UL (ref 4.1–11.1)

## 2018-01-12 PROCEDURE — 84484 ASSAY OF TROPONIN QUANT: CPT | Performed by: EMERGENCY MEDICINE

## 2018-01-12 PROCEDURE — 71046 X-RAY EXAM CHEST 2 VIEWS: CPT

## 2018-01-12 PROCEDURE — 36415 COLL VENOUS BLD VENIPUNCTURE: CPT | Performed by: EMERGENCY MEDICINE

## 2018-01-12 PROCEDURE — 99284 EMERGENCY DEPT VISIT MOD MDM: CPT

## 2018-01-12 PROCEDURE — 93005 ELECTROCARDIOGRAM TRACING: CPT

## 2018-01-12 PROCEDURE — 82550 ASSAY OF CK (CPK): CPT | Performed by: EMERGENCY MEDICINE

## 2018-01-12 PROCEDURE — 80053 COMPREHEN METABOLIC PANEL: CPT | Performed by: EMERGENCY MEDICINE

## 2018-01-12 PROCEDURE — 85025 COMPLETE CBC W/AUTO DIFF WBC: CPT | Performed by: EMERGENCY MEDICINE

## 2018-01-12 PROCEDURE — 83880 ASSAY OF NATRIURETIC PEPTIDE: CPT | Performed by: EMERGENCY MEDICINE

## 2018-01-12 RX ORDER — SODIUM CHLORIDE 0.9 % (FLUSH) 0.9 %
5-10 SYRINGE (ML) INJECTION EVERY 8 HOURS
Status: DISCONTINUED | OUTPATIENT
Start: 2018-01-12 | End: 2018-01-12 | Stop reason: HOSPADM

## 2018-01-12 RX ORDER — SILDENAFIL 100 MG/1
100 TABLET, FILM COATED ORAL AS NEEDED
COMMUNITY
Start: 2017-07-21

## 2018-01-12 RX ORDER — SODIUM CHLORIDE 0.9 % (FLUSH) 0.9 %
5-10 SYRINGE (ML) INJECTION AS NEEDED
Status: DISCONTINUED | OUTPATIENT
Start: 2018-01-12 | End: 2018-01-12 | Stop reason: HOSPADM

## 2018-01-12 RX ORDER — METFORMIN HYDROCHLORIDE 500 MG/1
500 TABLET ORAL 2 TIMES DAILY WITH MEALS
COMMUNITY
Start: 2017-07-21 | End: 2018-07-20

## 2018-01-12 RX ORDER — HYDROCODONE POLISTIREX AND CHLORPHENIRAMINE POLISTIREX 10; 8 MG/5ML; MG/5ML
5 SUSPENSION, EXTENDED RELEASE ORAL
Qty: 60 ML | Refills: 0 | Status: SHIPPED | OUTPATIENT
Start: 2018-01-12 | End: 2018-01-25

## 2018-01-12 NOTE — DISCHARGE INSTRUCTIONS

## 2018-01-12 NOTE — ED PROVIDER NOTES
EMERGENCY DEPARTMENT HISTORY AND PHYSICAL EXAM      Date: 1/12/2018  Patient Name: Jannet Monique II    History of Presenting Illness     Chief Complaint   Patient presents with    Chest Pain (Angina)     diffuse chest pain x two days; sent by PCP for abnormal EKG       History Provided By: Patient    HPI: Jannet Monique II, 63 y.o. male with PMHx significant for HTN and DM, presents ambulatory to the ED with cc of worsening intermittent CP with associated cough and congestion x 2 days. Pt states CP is exacerbated with deep inspiration, cough and exertion. He states CP intermittently radiates across the chest wall. Pt denies taking any medication for symptoms. He reports previously being evaluated by his PCP earlier today where he was referred to the ED. Pt specifically denies any fever, chills, nausea, or vomiting. PCP: Pankaj Clark MD    There are no other complaints, changes, or physical findings at this time. Current Facility-Administered Medications   Medication Dose Route Frequency Provider Last Rate Last Dose    sodium chloride (NS) flush 5-10 mL  5-10 mL IntraVENous Q8H Jose G Arita MD        sodium chloride (NS) flush 5-10 mL  5-10 mL IntraVENous PRN Jose G Arita MD         Current Outpatient Prescriptions   Medication Sig Dispense Refill    chlorpheniramine-HYDROcodone (TUSSIONEX PENNKINETIC ER) 10-8 mg/5 mL suspension Take 5 mL by mouth every twelve (12) hours as needed for Cough. Max Daily Amount: 10 mL. 60 mL 0    metFORMIN (GLUCOPHAGE) 500 mg tablet Take 500 mg by mouth.  sildenafil citrate (VIAGRA) 100 mg tablet Take 100 mg by mouth.  rivaroxaban (XARELTO) 20 mg tab tablet Take  by mouth daily.  oxyCODONE-acetaminophen (PERCOCET) 5-325 mg per tablet Take 1 Tab by mouth every six (6) hours as needed for Pain. Max Daily Amount: 4 Tabs.  Indications: causes drowsiness;do not drink,drive, or use machinery while taking 10 Tab 0    albuterol (PROVENTIL HFA, VENTOLIN HFA, PROAIR HFA) 90 mcg/actuation inhaler Take 2 Puffs by inhalation every four (4) hours as needed for Wheezing. 1 Inhaler 0    divalproex DR (DEPAKOTE) 500 mg tablet Take 1 tablet by mouth two (2) times a day. 90 tablet 3    metoprolol (LOPRESSOR) 100 mg tablet Take 1 tablet by mouth two (2) times a day. 1 tablet 0    simethicone (MYLICON) 80 mg chewable tablet Take 1 tablet by mouth four (4) times daily as needed. 30 tablet 0    omeprazole (PRILOSEC) 20 mg capsule Take 1 capsule by mouth daily. 30 capsule 0    aspirin delayed-release 81 mg tablet Take 81 mg by mouth daily.  LISINOPRIL PO Take 20 mg by mouth. Dose Unknown      citalopram (CELEXA) 20 mg tablet Take 0.5 Tabs by mouth daily. 30 Tab 3    alprazolam (XANAX) 2 mg tablet Take 2 mg by mouth two (2) times a day.          Past History     Past Medical History:  Past Medical History:   Diagnosis Date    Acquired lactose intolerance 12/1/2010    Atrial fibrillation ([address] 12/1/2010    Atrial septal aneurysm 3/12/2011    Depression     Diabetes ([address] 10/26/01    LOPEZ (dyspnea on exertion) 10/26/01    Essential hypertension     GERD (gastroesophageal reflux disease) 10/26/01    Headache(784.0) 10/26/01    Hypertension     IHSS (idiopathic hypertrophic subaortic stenosis) ([address] 12/1/2010    Morbid obesity ([address] 10/26/01    Other ill-defined conditions(799.89)     sleep apnea    Other ill-defined conditions(799.89)     migraines    Pneumonia     Psychiatric disorder     Depression since 2005    Secondary seizure disorder ([address] 12/1/2010    Seizures ([address]     since 2005    Sleep apnea 10/26/01    uses C pap    Stroke [facility])     TIA 2004    Syncope 2/7/2011    asystole (long) when getting Picc line at [facility], was admitted for pneumonia       Past Surgical History:  Past Surgical History:   Procedure Laterality Date    ABDOMEN SURGERY PROC UNLISTED      GASTRIC BYPASS,OBESE<150CM TODD-EN-Y  1/7/02    Dr Rosalba Samson RELEASE      HX GI      lap gastric bypass 1/7/02    HX GYN      cyst    HX ORTHOPAEDIC      carpal tunnel    HX OTHER SURGICAL      cyst removed from groin    HX OTHER SURGICAL      hernia repair       Family History:  Family History   Problem Relation Age of Onset    Hypertension Mother     Heart Disease Mother     Stroke Mother     Cancer Mother     Diabetes Father     Hypertension Father     Sickle Cell Trait Sister     Hypertension Sister     Bleeding Prob Sister     Hypertension Brother        Social History:  Social History   Substance Use Topics    Smoking status: Former Smoker    Smokeless tobacco: Never Used    Alcohol use 1.8 oz/week     3 Glasses of wine per week      Comment: occas       Allergies: Allergies   Allergen Reactions    Pcn [Penicillins] Anaphylaxis, Hives and Itching                Review of Systems   Review of Systems   Constitutional: Negative. Negative for activity change, appetite change, chills, fatigue, fever and unexpected weight change. HENT: Positive for congestion. Negative for hearing loss, rhinorrhea, sneezing and voice change. Eyes: Negative. Negative for pain and visual disturbance. Respiratory: Positive for cough. Negative for apnea, choking, chest tightness and shortness of breath. Cardiovascular: Positive for chest pain. Negative for palpitations. Gastrointestinal: Negative. Negative for abdominal distention, abdominal pain, blood in stool, diarrhea, nausea and vomiting. Genitourinary: Negative. Negative for difficulty urinating, flank pain, frequency and urgency. No discharge   Musculoskeletal: Negative. Negative for arthralgias, back pain, myalgias and neck stiffness. Skin: Negative. Negative for color change and rash. Neurological: Negative. Negative for dizziness, seizures, syncope, speech difficulty, weakness, numbness and headaches. Hematological: Negative for adenopathy. Psychiatric/Behavioral: Negative. Negative for agitation, behavioral problems, dysphoric mood and suicidal ideas. The patient is not nervous/anxious. Physical Exam   Physical Exam   Constitutional: He is oriented to person, place, and time. He appears well-developed and well-nourished. No distress. HENT:   Head: Normocephalic and atraumatic. Mouth/Throat: Oropharynx is clear and moist. No oropharyngeal exudate. Eyes: Conjunctivae and EOM are normal. Pupils are equal, round, and reactive to light. Right eye exhibits no discharge. Left eye exhibits no discharge. Neck: Normal range of motion. Neck supple. Cardiovascular: Normal rate, regular rhythm and intact distal pulses. Exam reveals no gallop and no friction rub. No murmur heard. Pulmonary/Chest: Effort normal and breath sounds normal. No respiratory distress. He has no wheezes. He has no rales. He exhibits no tenderness. Abdominal: Soft. Bowel sounds are normal. He exhibits no distension and no mass. There is no tenderness. There is no rebound and no guarding. Musculoskeletal: Normal range of motion. He exhibits no edema. Lymphadenopathy:     He has no cervical adenopathy. Neurological: He is alert and oriented to person, place, and time. No cranial nerve deficit. Coordination normal.   Skin: Skin is warm and dry. No rash noted. No erythema. Psychiatric: He has a normal mood and affect. Nursing note and vitals reviewed.         Diagnostic Study Results     Labs -     Recent Results (from the past 12 hour(s))   EKG, 12 LEAD, INITIAL    Collection Time: 01/12/18 12:02 PM   Result Value Ref Range    Ventricular Rate 84 BPM    Atrial Rate 84 BPM    P-R Interval 164 ms    QRS Duration 88 ms    Q-T Interval 388 ms    QTC Calculation (Bezet) 458 ms    Calculated P Axis 29 degrees    Calculated R Axis 49 degrees    Calculated T Axis 53 degrees    Diagnosis       Sinus rhythm with premature atrial complexes  When compared with ECG of 27-SEP-2017 12:56,  premature atrial complexes are now present     METABOLIC PANEL, COMPREHENSIVE    Collection Time: 01/12/18 12:25 PM   Result Value Ref Range    Sodium 137 136 - 145 mmol/L    Potassium 4.0 3.5 - 5.1 mmol/L    Chloride 103 97 - 108 mmol/L    CO2 29 21 - 32 mmol/L    Anion gap 5 5 - 15 mmol/L    Glucose 274 (H) 65 - 100 mg/dL    BUN 11 6 - 20 MG/DL    Creatinine 0.88 0.70 - 1.30 MG/DL    BUN/Creatinine ratio 13 12 - 20      GFR est AA >60 >60 ml/min/1.73m2    GFR est non-AA >60 >60 ml/min/1.73m2    Calcium 9.1 8.5 - 10.1 MG/DL    Bilirubin, total 0.4 0.2 - 1.0 MG/DL    ALT (SGPT) 22 12 - 78 U/L    AST (SGOT) 17 15 - 37 U/L    Alk. phosphatase 162 (H) 45 - 117 U/L    Protein, total 7.6 6.4 - 8.2 g/dL    Albumin 3.3 (L) 3.5 - 5.0 g/dL    Globulin 4.3 (H) 2.0 - 4.0 g/dL    A-G Ratio 0.8 (L) 1.1 - 2.2     CBC WITH AUTOMATED DIFF    Collection Time: 01/12/18 12:25 PM   Result Value Ref Range    WBC 6.1 4.1 - 11.1 K/uL    RBC 5.91 (H) 4.10 - 5.70 M/uL    HGB 13.6 12.1 - 17.0 g/dL    HCT 42.0 36.6 - 50.3 %    MCV 71.1 (L) 80.0 - 99.0 FL    MCH 23.0 (L) 26.0 - 34.0 PG    MCHC 32.4 30.0 - 36.5 g/dL    RDW 14.9 (H) 11.5 - 14.5 %    PLATELET 593 573 - 541 K/uL    NEUTROPHILS PENDING %    LYMPHOCYTES PENDING %    MONOCYTES PENDING %    EOSINOPHILS PENDING %    BASOPHILS PENDING %    ABS. NEUTROPHILS PENDING K/UL    ABS. LYMPHOCYTES PENDING K/UL    ABS. MONOCYTES PENDING K/UL    ABS. EOSINOPHILS PENDING K/UL    ABS. BASOPHILS PENDING K/UL    DF PENDING    TROPONIN I    Collection Time: 01/12/18 12:25 PM   Result Value Ref Range    Troponin-I, Qt. <0.04 <0.05 ng/mL   CK W/ REFLX CKMB    Collection Time: 01/12/18 12:25 PM   Result Value Ref Range    CK 85 39 - 308 U/L   NT-PRO BNP    Collection Time: 01/12/18 12:25 PM   Result Value Ref Range    NT pro-BNP 95 0 - 125 PG/ML       Radiologic Studies -   CXR Results  (Last 48 hours)               01/12/18 1248  XR CHEST PA LAT Final result    Impression:  IMPRESSION: No acute cardiopulmonary disease. Narrative: Indication: Chest pain for 2 days. Exam: PA and lateral views of the chest.       Direct comparison is made to prior CXR dated September 2017. Findings: Cardiomediastinal silhouette is within normal limits. Lungs are clear   bilaterally. Pleural spaces are normal. Osseous structures are intact. Medical Decision Making   I am the first provider for this patient. I reviewed the vital signs, available nursing notes, past medical history, past surgical history, family history and social history. Vital Signs-Reviewed the patient's vital signs. Patient Vitals for the past 12 hrs:   Temp Pulse Resp BP SpO2   01/12/18 1311 - 84 22 142/85 93 %   01/12/18 1210 98.1 °F (36.7 °C) 85 18 (!) 172/91 94 %       EKG interpretation: (Preliminary)  12:02  Rhythm: Sinus rhythm with premature atrial complexes; and regular . Rate (approx.): 84 bpm; Axis: normal; PA interval: normal; QRS interval: normal ; ST/T wave: normal; Other findings: When compared with ECG of 27-SEP-2017 12:56, premature atrial complexes are now present;. Written by Anastacio Rosado, ED Scribe, as dictated by Alba Johnston. Nika Carreno MD.    Records Reviewed: Nursing Notes and Old Medical Records    Provider Notes (Medical Decision Making):   DDx: ACS, arrhythmia, bronchitis, congestion    ED Course:   Initial assessment performed. The patients presenting problems have been discussed, and they are in agreement with the care plan formulated and outlined with them. I have encouraged them to ask questions as they arise throughout their visit.    1:22 PM  Pt complains of cough and congestion; symptoms do not appear cardiac in origin. Pt agrees symptoms are an extension of his bronchitis. Disposition:  DISCHARGE NOTE:  1:24 PM  The patient has been re-evaluated and is ready for discharge. Reviewed available results with patient. Counseled patient on diagnosis and care plan.  Patient has expressed understanding, and all questions have been answered. Patient agrees with plan and agrees to follow up as recommended, or return to the ED if their symptoms worsen. Discharge instructions have been provided and explained to the patient, along with reasons to return to the ED. PLAN:  1. Current Discharge Medication List      CONTINUE these medications which have CHANGED    Details   chlorpheniramine-HYDROcodone (TUSSIONEX PENNKINETIC ER) 10-8 mg/5 mL suspension Take 5 mL by mouth every twelve (12) hours as needed for Cough. Max Daily Amount: 10 mL. Qty: 60 mL, Refills: 0    Associated Diagnoses: Cough           2. Follow-up Information     Follow up With Details Comments Contact Info    Kamla Dupree MD Call in 2 days  [address]. [phone]          Return to ED if worse     Diagnosis     Clinical Impression:   1. Cough        Attestations:  ATTESTATION:  This note is prepared by Melany Levin, acting as Scribe for Crsitobal Wiggins 21 Nicky Figueroa MD: The scribe's documentation has been prepared under my direction and personally reviewed by me in its entirety. I confirm that the note above accurately reflects all work, treatment, procedures, and medical decision making performed by me.

## 2018-01-12 NOTE — ED NOTES
Received pt to exam room, resting on stretcher in position of comfort, call bell given to pt; pt reports grace \"chest aching\" intermittent x 2 days, pain is worse with exertion, +sob, +nausea; states he saw his PCP this am for a routine check up and was sent here due to an abnormal EKG

## 2018-01-12 NOTE — PROGRESS NOTES
Chief Complaint   Patient presents with    Establish Care     HPI:  Jose De Jesus Larose is a 63 y.o. AA male h/o IHSS, s/p cardiac cath, morbid obesity, diabetes type 2 presents to establish care. Patient is complaining of chest discomfort ongoing for 3-4 days, EKG compared to last shows some changes. He experiencing chest pain even during interview. Advised to go to ER for evaluation.        Review of Systems  As per hpi    Past Medical History:   Diagnosis Date    Acquired lactose intolerance 12/1/2010    Atrial fibrillation ([address] 12/1/2010    Atrial septal aneurysm 3/12/2011    Depression     Diabetes ([address] 10/26/01    LOPEZ (dyspnea on exertion) 10/26/01    Essential hypertension     GERD (gastroesophageal reflux disease) 10/26/01    Headache(784.0) 10/26/01    Hypertension     IHSS (idiopathic hypertrophic subaortic stenosis) ([address] 12/1/2010    Morbid obesity ([address] 10/26/01    Other ill-defined conditions(799.89)     sleep apnea    Other ill-defined conditions(799.89)     migraines    Pneumonia     Psychiatric disorder     Depression since 2005    Secondary seizure disorder ([address] 12/1/2010    Seizures ([address]     since 2005    Sleep apnea 10/26/01    uses C pap    Stroke [facility])     TIA 2004    Syncope 2/7/2011    asystole (long) when getting Picc line at [facility], was admitted for pneumonia     Past Surgical History:   Procedure Laterality Date    ABDOMEN SURGERY PROC UNLISTED      GASTRIC BYPASS,OBESE<150CM TODD-EN-Y  1/7/02    Dr Mickie Hartley    HX CARPAL TUNNEL RELEASE      HX GI      lap gastric bypass 1/7/02    HX GYN      cyst    HX ORTHOPAEDIC      carpal tunnel    HX OTHER SURGICAL      cyst removed from groin    HX OTHER SURGICAL      hernia repair     Social History     Social History    Marital status:      Spouse name: N/A    Number of children: N/A    Years of education: N/A     Social History Main Topics    Smoking status: Former Smoker    Smokeless tobacco: Never Used    Alcohol use 1.8 oz/week     3 Glasses of wine per week      Comment: occas    Drug use: No    Sexual activity: Yes     Partners: Female     Other Topics Concern    None     Social History Narrative     Family History   Problem Relation Age of Onset    Hypertension Mother     Heart Disease Mother     Stroke Mother     Cancer Mother     Diabetes Father     Hypertension Father     Sickle Cell Trait Sister     Hypertension Sister     Bleeding Prob Sister     Hypertension Brother      Current Outpatient Prescriptions   Medication Sig Dispense Refill    metFORMIN (GLUCOPHAGE) 500 mg tablet Take 500 mg by mouth.  sildenafil citrate (VIAGRA) 100 mg tablet Take 100 mg by mouth.  rivaroxaban (XARELTO) 20 mg tab tablet Take  by mouth daily.  oxyCODONE-acetaminophen (PERCOCET) 5-325 mg per tablet Take 1 Tab by mouth every six (6) hours as needed for Pain. Max Daily Amount: 4 Tabs. Indications: causes drowsiness;do not drink,drive, or use machinery while taking 10 Tab 0    albuterol (PROVENTIL HFA, VENTOLIN HFA, PROAIR HFA) 90 mcg/actuation inhaler Take 2 Puffs by inhalation every four (4) hours as needed for Wheezing. 1 Inhaler 0    chlorpheniramine-HYDROcodone (TUSSIONEX PENNKINETIC ER) 10-8 mg/5 mL suspension Take 5 mL by mouth every twelve (12) hours as needed for Cough. Max Daily Amount: 10 mL. 60 mL 0    divalproex DR (DEPAKOTE) 500 mg tablet Take 1 tablet by mouth two (2) times a day. 90 tablet 3    simethicone (MYLICON) 80 mg chewable tablet Take 1 tablet by mouth four (4) times daily as needed. 30 tablet 0    aspirin delayed-release 81 mg tablet Take 81 mg by mouth daily.  LISINOPRIL PO Take 20 mg by mouth. Dose Unknown      citalopram (CELEXA) 20 mg tablet Take 0.5 Tabs by mouth daily. 30 Tab 3    alprazolam (XANAX) 2 mg tablet Take 2 mg by mouth two (2) times a day.  metoprolol (LOPRESSOR) 100 mg tablet Take 1 tablet by mouth two (2) times a day.  1 tablet 0    omeprazole (PRILOSEC) 20 mg capsule Take 1 capsule by mouth daily. 30 capsule 0     Allergies   Allergen Reactions    Pcn [Penicillins] Anaphylaxis, Hives and Itching            Objective:  Visit Vitals    /89    Pulse 89    Temp 97.4 °F (36.3 °C) (Oral)    Resp 18    Ht 5' 7\" (1.702 m)    Wt 315 lb (142.9 kg)    SpO2 96%    BMI 49.34 kg/m2     Physical Exam:   General appearance - alert, morbidly obese appearing in moderate distress  Neck - supple, no jvd   Chest - clear to auscultation, no wheezes, rales or rhonchi  Heart - normal rate, regular rhythm  Abdomen - obese, nontender, nondistended, no organomegaly  Ext-peripheral pulses normal, no pedal edema  Neuro -alert, oriented, normal speech, no focal findings  Back-full range of motion, no tenderness, palpable spasm or pain on motion     Results for orders placed or performed during the hospital encounter of 09/27/17   CBC WITH AUTOMATED DIFF   Result Value Ref Range    WBC 6.7 4.1 - 11.1 K/uL    RBC 5.60 4.10 - 5.70 M/uL    HGB 12.8 12.1 - 17.0 g/dL    HCT 40.6 36.6 - 50.3 %    MCV 72.5 (L) 80.0 - 99.0 FL    MCH 22.9 (L) 26.0 - 34.0 PG    MCHC 31.5 30.0 - 36.5 g/dL    RDW 15.5 (H) 11.5 - 14.5 %    PLATELET 513 574 - 576 K/uL    NEUTROPHILS 63 32 - 75 %    LYMPHOCYTES 28 12 - 49 %    MONOCYTES 8 5 - 13 %    EOSINOPHILS 1 0 - 7 %    BASOPHILS 0 0 - 1 %    ABS. NEUTROPHILS 4.3 1.8 - 8.0 K/UL    ABS. LYMPHOCYTES 1.9 0.8 - 3.5 K/UL    ABS. MONOCYTES 0.5 0.0 - 1.0 K/UL    ABS. EOSINOPHILS 0.1 0.0 - 0.4 K/UL    ABS.  BASOPHILS 0.0 0.0 - 0.1 K/UL   METABOLIC PANEL, COMPREHENSIVE   Result Value Ref Range    Sodium 137 136 - 145 mmol/L    Potassium 4.2 3.5 - 5.1 mmol/L    Chloride 103 97 - 108 mmol/L    CO2 27 21 - 32 mmol/L    Anion gap 7 5 - 15 mmol/L    Glucose 191 (H) 65 - 100 mg/dL    BUN 14 6 - 20 MG/DL    Creatinine 0.68 (L) 0.70 - 1.30 MG/DL    BUN/Creatinine ratio 21 (H) 12 - 20      GFR est AA >60 >60 ml/min/1.73m2    GFR est non-AA >60 >60 ml/min/1.73m2    Calcium 8.8 8.5 - 10.1 MG/DL    Bilirubin, total 0.4 0.2 - 1.0 MG/DL    ALT (SGPT) 34 12 - 78 U/L    AST (SGOT) 16 15 - 37 U/L    Alk. phosphatase 129 (H) 45 - 117 U/L    Protein, total 7.1 6.4 - 8.2 g/dL    Albumin 3.1 (L) 3.5 - 5.0 g/dL    Globulin 4.0 2.0 - 4.0 g/dL    A-G Ratio 0.8 (L) 1.1 - 2.2     CK W/ REFLX CKMB   Result Value Ref Range    CK 63 39 - 308 U/L   PROTHROMBIN TIME + INR   Result Value Ref Range    INR 1.0 0.9 - 1.1      Prothrombin time 10.3 9.0 - 11.1 sec   PTT   Result Value Ref Range    aPTT 29.6 22.1 - 32.5 sec    aPTT, therapeutic range     58.0 - 77.0 SECS   TROPONIN I   Result Value Ref Range    Troponin-I, Qt. <0.04 <0.05 ng/mL   VALPROIC ACID   Result Value Ref Range    Valproic acid 4 (L) 50 - 100 ug/ml   LIPASE   Result Value Ref Range    Lipase 1067 (H) 73 - 393 U/L   D DIMER   Result Value Ref Range    D-dimer 0.25 0.00 - 0.65 mg/L FEU   TROPONIN I   Result Value Ref Range    Troponin-I, Qt. <0.04 <0.05 ng/mL   EKG, 12 LEAD, INITIAL   Result Value Ref Range    Ventricular Rate 78 BPM    Atrial Rate 78 BPM    P-R Interval 178 ms    QRS Duration 98 ms    Q-T Interval 390 ms    QTC Calculation (Bezet) 444 ms    Calculated P Axis 4 degrees    Calculated R Axis 7 degrees    Calculated T Axis 52 degrees    Diagnosis       Sinus rhythm with marked sinus arrhythmia  When compared with ECG of 21-AUG-2017 05:09,  premature atrial complexes are no longer present  Confirmed by Lazarus Benes ([id number]) on 9/27/2017 7:29:59 PM       Assessment/Plan:    ICD-10-CM ICD-9-CM    1. Morbid obesity ([facility] E66.01 278.01    2. Chest pain at rest R07.9 786.50 AMB POC EKG ROUTINE W/ 12 LEADS, INTER & REP   3. Hypertension, well controlled I10 401.9    4.  Controlled type 2 diabetes mellitus with diabetic nephropathy, without long-term current use of insulin (HCC) E11.21 250.40      583.81      Patient Instructions   Advised to go to ER for evaluation  RTC after Ervisit    Follow-up Disposition:  Return if symptoms worsen or fail to improve, for after ER visit.

## 2018-01-12 NOTE — PROGRESS NOTES
Chief Complaint   Patient presents with   [facility].     Patient here today to establish care and c/o of chest ache x 3 days.

## 2018-01-12 NOTE — MR AVS SNAPSHOT
Visit Information Date & Time Provider Department Dept. Phone Encounter #  
 1/12/2018 10:15 AM Becca Willard MD [facility] at [address] [id number] Follow-up Instructions Return if symptoms worsen or fail to improve, for after ER visit. Follow-up and Disposition History Your Appointments 2/21/2018  3:20 PM  
New Patient with Derian Mayes MD  
[id number] [facility] ([facility]) Appt Note: NP refd by Dr. Bello Client for continued care of OSA_bringing machine. [facility] after Jan 1. Will obtain referral  
 [address] [facility] [address] [id number] [address] [facility] [address] [id number] Upcoming Health Maintenance Date Due Hepatitis C Screening 1955 DTaP/Tdap/Td series (1 - Tdap) 11/4/1978 FOBT Q 1 YEAR AGE 50-75 11/4/2007 Influenza Age 5 to Adult 8/1/2017 ZOSTER VACCINE AGE 60> 9/4/2017 Allergies as of 1/12/2018  Review Complete On: 1/12/2018 By: Ileana Hitchcock LPN Severity Noted Reaction Type Reactions Pcn [Penicillins] High 04/15/2010   Systemic Anaphylaxis, Hives, Itching Current Immunizations  Never Reviewed Name Date Influenza Vaccine Whole 9/1/2010 ZZZ-RETIRED (DO NOT USE) Pneumococcal Vaccine (Unspecified Type) 1/1/2011 Not reviewed this visit You Were Diagnosed With   
  
 Codes Comments Morbid obesity ([facility] [address]    -  Primary ICD-10-CM: E66.01 
ICD-9-CM: 278.01 Chest pain at rest     ICD-10-CM: R07.9 ICD-9-CM: 786.50 Hypertension, well controlled     ICD-10-CM: I10 
ICD-9-CM: 401.9 Controlled type 2 diabetes mellitus with diabetic nephropathy, without long-term current use of insulin (HCC)     ICD-10-CM: E11.21 
ICD-9-CM: 250.40, 583.81 Vitals BP Pulse Temp Resp Height(growth percentile) Weight(growth percentile) 129/89 89 97.4 °F (36.3 °C) (Oral) 18 5' 7\" (1.702 m) 315 lb (142.9 kg) SpO2 BMI Smoking Status 96% 49.34 kg/m2 Former Smoker Vitals History BMI and BSA Data Body Mass Index Body Surface Area  
 49.34 kg/m 2 2.6 m 2 Preferred Pharmacy Pharmacy Name Phone [facility] [address], [address] [facility] [address] [phone] Your Updated Medication List  
  
   
This list is accurate as of: 1/12/18 11:28 AM.  Always use your most recent med list.  
  
  
  
  
 albuterol 90 mcg/actuation inhaler Commonly known as:  PROVENTIL HFA, VENTOLIN HFA, PROAIR HFA Take 2 Puffs by inhalation every four (4) hours as needed for Wheezing. ALPRAZolam 2 mg tablet Commonly known as:  Luvenia Both Take 2 mg by mouth two (2) times a day. aspirin delayed-release 81 mg tablet Take 81 mg by mouth daily. chlorpheniramine-HYDROcodone 10-8 mg/5 mL suspension Commonly known as:  Marthenia Likes ER Take 5 mL by mouth every twelve (12) hours as needed for Cough. Max Daily Amount: 10 mL. citalopram 20 mg tablet Commonly known as:  Kevon Call Take 0.5 Tabs by mouth daily. divalproex  mg tablet Commonly known as:  DEPAKOTE Take 1 tablet by mouth two (2) times a day. LISINOPRIL PO Take 20 mg by mouth. Dose Unknown  
  
 metFORMIN 500 mg tablet Commonly known as:  GLUCOPHAGE Take 500 mg by mouth.  
  
 metoprolol tartrate 100 mg IR tablet Commonly known as:  LOPRESSOR Take 1 tablet by mouth two (2) times a day. omeprazole 20 mg capsule Commonly known as:  PriLOSEC Take 1 capsule by mouth daily. oxyCODONE-acetaminophen 5-325 mg per tablet Commonly known as:  PERCOCET Take 1 Tab by mouth every six (6) hours as needed for Pain. Max Daily Amount: 4 Tabs. Indications: causes drowsiness;do not drink,drive, or use machinery while taking simethicone 80 mg chewable tablet Commonly known as:  Vincent Edyeanhungi Take 1 tablet by mouth four (4) times daily as needed. VIAGRA 100 mg tablet Generic drug:  sildenafil citrate Take 100 mg by mouth. XARELTO 20 mg Tab tablet Generic drug:  rivaroxaban Take  by mouth daily. We Performed the Following AMB POC EKG ROUTINE W/ 12 LEADS, INTER & REP [02971 CPT(R)] Follow-up Instructions Return if symptoms worsen or fail to improve, for after ER visit. Patient Instructions Advised to go to ER for evaluation RTC after Ervisit Introducing [facility]! Liz Mott introduces [employer] patient portal. Now you can access parts of your medical record, email your doctor's office, and request medication refills online. 1. In your internet browser, go to [email or web address] 2. Click on the First Time User? Click Here link in the Sign In box. You will see the New Member Sign Up page. 3. Enter your [employer] Access Code exactly as it appears below. You will not need to use this code after youve completed the sign-up process. If you do not sign up before the expiration date, you must request a new code. · [employer] Access Code: [email or web address] Expires: 3/20/2018  1:13 PM 
 
4. Enter the last four digits of your Social Security Number (xxxx) and Date of Birth (mm/dd/yyyy) as indicated and click Submit. You will be taken to the next sign-up page. 5. Create a Echo itt ID. This will be your [employer] login ID and cannot be changed, so think of one that is secure and easy to remember. 6. Create a [employer] password. You can change your password at any time. 7. Enter your Password Reset Question and Answer. This can be used at a later time if you forget your password. 8. Enter your e-mail address. You will receive e-mail notification when new information is available in [address] 9. Click Sign Up. You can now view and download portions of your medical record. 10. Click the Download Summary menu link to download a portable copy of your medical information. If you have questions, please visit the Frequently Asked Questions section of the [employer] website. Remember, [employer] is NOT to be used for urgent needs. For medical emergencies, dial 911. Now available from your iPhone and Android! Please provide this summary of care documentation to your next provider. Your primary care clinician is listed as Alejandro Medina. If you have any questions after today's visit, please call [phone].

## 2018-01-24 PROCEDURE — 99285 EMERGENCY DEPT VISIT HI MDM: CPT

## 2018-01-25 ENCOUNTER — HOSPITAL ENCOUNTER (EMERGENCY)
Age: 63
Discharge: HOME | End: 2018-01-25
Attending: EMERGENCY MEDICINE | Admitting: EMERGENCY MEDICINE
Payer: MEDICARE

## 2018-01-25 ENCOUNTER — APPOINTMENT (OUTPATIENT)
Dept: GENERAL RADIOLOGY | Age: 63
End: 2018-01-25
Attending: EMERGENCY MEDICINE
Payer: MEDICARE

## 2018-01-25 VITALS
TEMPERATURE: 97.8 F | SYSTOLIC BLOOD PRESSURE: 139 MMHG | RESPIRATION RATE: 21 BRPM | HEART RATE: 91 BPM | OXYGEN SATURATION: 96 % | BODY MASS INDEX: 49.44 KG/M2 | DIASTOLIC BLOOD PRESSURE: 96 MMHG | WEIGHT: 315 LBS | HEIGHT: 67 IN

## 2018-01-25 DIAGNOSIS — R07.9 ACUTE CHEST PAIN: ICD-10-CM

## 2018-01-25 DIAGNOSIS — R05.9 COUGH: ICD-10-CM

## 2018-01-25 DIAGNOSIS — R06.02 SHORTNESS OF BREATH: Primary | ICD-10-CM

## 2018-01-25 LAB
ANION GAP SERPL CALC-SCNC: 7 MMOL/L (ref 5–15)
ATRIAL RATE: 109 BPM
BASOPHILS # BLD: 0 K/UL (ref 0–0.1)
BASOPHILS NFR BLD: 0 % (ref 0–1)
BNP SERPL-MCNC: 222 PG/ML (ref 0–125)
BUN SERPL-MCNC: 10 MG/DL (ref 6–20)
BUN/CREAT SERPL: 11 (ref 12–20)
CALCIUM SERPL-MCNC: 9.2 MG/DL (ref 8.5–10.1)
CALCULATED P AXIS, ECG09: 16 DEGREES
CALCULATED R AXIS, ECG10: 23 DEGREES
CALCULATED T AXIS, ECG11: 55 DEGREES
CHLORIDE SERPL-SCNC: 100 MMOL/L (ref 97–108)
CO2 SERPL-SCNC: 29 MMOL/L (ref 21–32)
CREAT SERPL-MCNC: 0.92 MG/DL (ref 0.7–1.3)
DIAGNOSIS, 93000: NORMAL
DIFFERENTIAL METHOD BLD: ABNORMAL
EOSINOPHIL # BLD: 0 K/UL (ref 0–0.4)
EOSINOPHIL NFR BLD: 0 % (ref 0–7)
ERYTHROCYTE [DISTWIDTH] IN BLOOD BY AUTOMATED COUNT: 15 % (ref 11.5–14.5)
GLUCOSE SERPL-MCNC: 360 MG/DL (ref 65–100)
HCT VFR BLD AUTO: 41.1 % (ref 36.6–50.3)
HGB BLD-MCNC: 13.2 G/DL (ref 12.1–17)
LYMPHOCYTES # BLD: 1.7 K/UL (ref 0.8–3.5)
LYMPHOCYTES NFR BLD: 24 % (ref 12–49)
MCH RBC QN AUTO: 22.8 PG (ref 26–34)
MCHC RBC AUTO-ENTMCNC: 32.1 G/DL (ref 30–36.5)
MCV RBC AUTO: 70.9 FL (ref 80–99)
MONOCYTES # BLD: 0.5 K/UL (ref 0–1)
MONOCYTES NFR BLD: 7 % (ref 5–13)
NEUTS SEG # BLD: 4.8 K/UL (ref 1.8–8)
NEUTS SEG NFR BLD: 69 % (ref 32–75)
P-R INTERVAL, ECG05: 162 MS
PLATELET # BLD AUTO: 221 K/UL (ref 150–400)
POTASSIUM SERPL-SCNC: 3.5 MMOL/L (ref 3.5–5.1)
Q-T INTERVAL, ECG07: 352 MS
QRS DURATION, ECG06: 96 MS
QTC CALCULATION (BEZET), ECG08: 474 MS
RBC # BLD AUTO: 5.8 M/UL (ref 4.1–5.7)
RBC MORPH BLD: ABNORMAL
SODIUM SERPL-SCNC: 136 MMOL/L (ref 136–145)
TROPONIN I SERPL-MCNC: <0.04 NG/ML
VENTRICULAR RATE, ECG03: 109 BPM
WBC # BLD AUTO: 7 K/UL (ref 4.1–11.1)

## 2018-01-25 PROCEDURE — 74011250636 HC RX REV CODE- 250/636: Performed by: EMERGENCY MEDICINE

## 2018-01-25 PROCEDURE — 94640 AIRWAY INHALATION TREATMENT: CPT

## 2018-01-25 PROCEDURE — 80048 BASIC METABOLIC PNL TOTAL CA: CPT | Performed by: EMERGENCY MEDICINE

## 2018-01-25 PROCEDURE — 96374 THER/PROPH/DIAG INJ IV PUSH: CPT

## 2018-01-25 PROCEDURE — 74011250637 HC RX REV CODE- 250/637: Performed by: EMERGENCY MEDICINE

## 2018-01-25 PROCEDURE — 77030029684 HC NEB SM VOL KT MONA -A

## 2018-01-25 PROCEDURE — 93005 ELECTROCARDIOGRAM TRACING: CPT

## 2018-01-25 PROCEDURE — 74011000250 HC RX REV CODE- 250: Performed by: EMERGENCY MEDICINE

## 2018-01-25 PROCEDURE — 36415 COLL VENOUS BLD VENIPUNCTURE: CPT | Performed by: EMERGENCY MEDICINE

## 2018-01-25 PROCEDURE — 85025 COMPLETE CBC W/AUTO DIFF WBC: CPT | Performed by: EMERGENCY MEDICINE

## 2018-01-25 PROCEDURE — 84484 ASSAY OF TROPONIN QUANT: CPT | Performed by: EMERGENCY MEDICINE

## 2018-01-25 PROCEDURE — 74011636637 HC RX REV CODE- 636/637: Performed by: EMERGENCY MEDICINE

## 2018-01-25 PROCEDURE — 71046 X-RAY EXAM CHEST 2 VIEWS: CPT

## 2018-01-25 PROCEDURE — 83880 ASSAY OF NATRIURETIC PEPTIDE: CPT | Performed by: EMERGENCY MEDICINE

## 2018-01-25 RX ORDER — ALBUTEROL SULFATE 90 UG/1
2 AEROSOL, METERED RESPIRATORY (INHALATION)
Qty: 1 INHALER | Refills: 0 | Status: SHIPPED | OUTPATIENT
Start: 2018-01-25 | End: 2018-02-10

## 2018-01-25 RX ORDER — ACETAMINOPHEN 325 MG/1
650 TABLET ORAL ONCE
Status: COMPLETED | OUTPATIENT
Start: 2018-01-25 | End: 2018-01-25

## 2018-01-25 RX ORDER — PREDNISONE 10 MG/1
30 TABLET ORAL
Qty: 9 TAB | Refills: 0 | Status: SHIPPED | OUTPATIENT
Start: 2018-01-25 | End: 2018-01-28

## 2018-01-25 RX ORDER — FUROSEMIDE 10 MG/ML
20 INJECTION INTRAMUSCULAR; INTRAVENOUS
Status: COMPLETED | OUTPATIENT
Start: 2018-01-25 | End: 2018-01-25

## 2018-01-25 RX ORDER — HYDROCODONE POLISTIREX AND CHLORPHENIRAMINE POLISTIREX 10; 8 MG/5ML; MG/5ML
5 SUSPENSION, EXTENDED RELEASE ORAL
Qty: 60 ML | Refills: 0 | Status: SHIPPED | OUTPATIENT
Start: 2018-01-25 | End: 2018-02-10 | Stop reason: ALTCHOICE

## 2018-01-25 RX ORDER — ALBUTEROL SULFATE 0.83 MG/ML
2.5 SOLUTION RESPIRATORY (INHALATION)
Status: COMPLETED | OUTPATIENT
Start: 2018-01-25 | End: 2018-01-25

## 2018-01-25 RX ORDER — HYDROCODONE BITARTRATE AND ACETAMINOPHEN 5; 325 MG/1; MG/1
1 TABLET ORAL ONCE
Status: COMPLETED | OUTPATIENT
Start: 2018-01-25 | End: 2018-01-25

## 2018-01-25 RX ORDER — IPRATROPIUM BROMIDE 0.5 MG/2.5ML
0.5 SOLUTION RESPIRATORY (INHALATION)
Status: COMPLETED | OUTPATIENT
Start: 2018-01-25 | End: 2018-01-25

## 2018-01-25 RX ORDER — PREDNISONE 20 MG/1
60 TABLET ORAL
Status: COMPLETED | OUTPATIENT
Start: 2018-01-25 | End: 2018-01-25

## 2018-01-25 RX ORDER — ALBUTEROL SULFATE 0.83 MG/ML
SOLUTION RESPIRATORY (INHALATION)
Status: DISCONTINUED
Start: 2018-01-25 | End: 2018-01-25 | Stop reason: HOSPADM

## 2018-01-25 RX ORDER — ALBUTEROL SULFATE 0.83 MG/ML
5 SOLUTION RESPIRATORY (INHALATION)
Status: COMPLETED | OUTPATIENT
Start: 2018-01-25 | End: 2018-01-25

## 2018-01-25 RX ADMIN — HYDROCODONE BITARTRATE AND ACETAMINOPHEN 1 TABLET: 5; 325 TABLET ORAL at 00:41

## 2018-01-25 RX ADMIN — ALBUTEROL SULFATE 5 MG: 2.5 SOLUTION RESPIRATORY (INHALATION) at 00:44

## 2018-01-25 RX ADMIN — IPRATROPIUM BROMIDE 0.5 MG: 0.5 SOLUTION RESPIRATORY (INHALATION) at 00:44

## 2018-01-25 RX ADMIN — NITROGLYCERIN 0.5 INCH: 20 OINTMENT TOPICAL at 02:54

## 2018-01-25 RX ADMIN — FUROSEMIDE 20 MG: 10 INJECTION, SOLUTION INTRAMUSCULAR; INTRAVENOUS at 02:55

## 2018-01-25 RX ADMIN — ACETAMINOPHEN 650 MG: 325 TABLET ORAL at 00:40

## 2018-01-25 RX ADMIN — ALBUTEROL SULFATE 2.5 MG: 2.5 SOLUTION RESPIRATORY (INHALATION) at 02:54

## 2018-01-25 RX ADMIN — PREDNISONE 60 MG: 20 TABLET ORAL at 00:40

## 2018-01-25 NOTE — DISCHARGE INSTRUCTIONS
Chest Pain: Care Instructions  Your Care Instructions    There are many things that can cause chest pain. Some are not serious and will get better on their own in a few days. But some kinds of chest pain need more testing and treatment. Your doctor may have recommended a follow-up visit in the next 8 to 12 hours. If you are not getting better, you may need more tests or treatment. Even though your doctor has released you, you still need to watch for any problems. The doctor carefully checked you, but sometimes problems can develop later. If you have new symptoms or if your symptoms do not get better, get medical care right away. If you have worse or different chest pain or pressure that lasts more than 5 minutes or you passed out (lost consciousness), call 911 or seek other emergency help right away. A medical visit is only one step in your treatment. Even if you feel better, you still need to do what your doctor recommends, such as going to all suggested follow-up appointments and taking medicines exactly as directed. This will help you recover and help prevent future problems. How can you care for yourself at home? · Rest until you feel better. · Take your medicine exactly as prescribed. Call your doctor if you think you are having a problem with your medicine. · Do not drive after taking a prescription pain medicine. When should you call for help? Call 911 if:  ? · You passed out (lost consciousness). ? · You have severe difficulty breathing. ? · You have symptoms of a heart attack. These may include:  ¨ Chest pain or pressure, or a strange feeling in your chest.  ¨ Sweating. ¨ Shortness of breath. ¨ Nausea or vomiting. ¨ Pain, pressure, or a strange feeling in your back, neck, jaw, or upper belly or in one or both shoulders or arms. ¨ Lightheadedness or sudden weakness. ¨ A fast or irregular heartbeat.   After you call 911, the  may tell you to chew 1 adult-strength or 2 to 4 low-dose aspirin. Wait for an ambulance. Do not try to drive yourself. ?Call your doctor today if:  ? · You have any trouble breathing. ? · Your chest pain gets worse. ? · You are dizzy or lightheaded, or you feel like you may faint. ? · You are not getting better as expected. ? · You are having new or different chest pain. Where can you learn more? Go to http://dean-sergio.info/. Enter A120 in the search box to learn more about \"Chest Pain: Care Instructions. \"  Current as of: March 20, 2017  Content Version: 11.4  © 1289-8600 [employer]. Care instructions adapted under license by [employer] (which disclaims liability or warranty for this information). If you have questions about a medical condition or this instruction, always ask your healthcare professional. [place] [address] any warranty or liability for your use of this information. Shortness of Breath: Care Instructions  Your Care Instructions  Shortness of breath has many causes. Sometimes conditions such as anxiety can lead to shortness of breath. Some people get mild shortness of breath when they exercise. Trouble breathing also can be a symptom of a serious problem, such as asthma, lung disease, emphysema, heart problems, and pneumonia. If your shortness of breath continues, you may need tests and treatment. Watch for any changes in your breathing and other symptoms. Follow-up care is a key part of your treatment and safety. Be sure to make and go to all appointments, and call your doctor if you are having problems. It's also a good idea to know your test results and keep a list of the medicines you take. How can you care for yourself at home? · Do not smoke or allow others to smoke around you. If you need help quitting, talk to your doctor about stop-smoking programs and medicines. These can increase your chances of quitting for good.   · Get plenty of rest and sleep.  · Take your medicines exactly as prescribed. Call your doctor if you think you are having a problem with your medicine. · Find healthy ways to deal with stress. ¨ Exercise daily. ¨ Get plenty of sleep. ¨ Eat regularly and well. When should you call for help? Call 911 anytime you think you may need emergency care. For example, call if:  ? · You have severe shortness of breath. ? · You have symptoms of a heart attack. These may include:  ¨ Chest pain or pressure, or a strange feeling in the chest.  ¨ Sweating. ¨ Shortness of breath. ¨ Nausea or vomiting. ¨ Pain, pressure, or a strange feeling in the back, neck, jaw, or upper belly or in one or both shoulders or arms. ¨ Lightheadedness or sudden weakness. ¨ A fast or irregular heartbeat. After you call 911, the  may tell you to chew 1 adult-strength or 2 to 4 low-dose aspirin. Wait for an ambulance. Do not try to drive yourself. ?Call your doctor now or seek immediate medical care if:  ? · Your shortness of breath gets worse or you start to wheeze. Wheezing is a high-pitched sound when you breathe. ? · You wake up at night out of breath or have to prop your head up on several pillows to breathe. ? · You are short of breath after only light activity or while at rest.   ? Watch closely for changes in your health, and be sure to contact your doctor if:  ? · You do not get better over the next 1 to 2 days. Where can you learn more? Go to http://dean-sergio.info/. Enter S780 in the search box to learn more about \"Shortness of Breath: Care Instructions. \"  Current as of: May 12, 2017  Content Version: 11.4  © 5466-8640 [employer]). Care instructions adapted under license by [employer] (which disclaims liability or warranty for this information).  If you have questions about a medical condition or this instruction, always ask your healthcare professional. Theresa Sellers any warranty or liability for your use of this information. Cough: Care Instructions  Your Care Instructions  A cough is your body's response to something that bothers your throat or airways. Many things can cause a cough. You might cough because of a cold or the flu, bronchitis, or asthma. Smoking, postnasal drip, allergies, and stomach acid that backs up into your throat also can cause coughs. A cough is a symptom, not a disease. Most coughs stop when the cause, such as a cold, goes away. You can take a few steps at home to cough less and feel better. Follow-up care is a key part of your treatment and safety. Be sure to make and go to all appointments, and call your doctor if you are having problems. It's also a good idea to know your test results and keep a list of the medicines you take. How can you care for yourself at home? · Drink lots of water and other fluids. This helps thin the mucus and soothes a dry or sore throat. Honey or lemon juice in hot water or tea may ease a dry cough. · Take cough medicine as directed by your doctor. · Prop up your head on pillows to help you breathe and ease a dry cough. · Try cough drops to soothe a dry or sore throat. Cough drops don't stop a cough. Medicine-flavored cough drops are no better than candy-flavored drops or hard candy. · Do not smoke. Avoid secondhand smoke. If you need help quitting, talk to your doctor about stop-smoking programs and medicines. These can increase your chances of quitting for good. When should you call for help? Call 911 anytime you think you may need emergency care. For example, call if:  ? · You have severe trouble breathing. ?Call your doctor now or seek immediate medical care if:  ? · You cough up blood. ? · You have new or worse trouble breathing. ? · You have a new or higher fever. ? · You have a new rash. ? Watch closely for changes in your health, and be sure to contact your doctor if:  ? · You cough more deeply or more often, especially if you notice more mucus or a change in the color of your mucus. ? · You have new symptoms, such as a sore throat, an earache, or sinus pain. ? · You do not get better as expected. Where can you learn more? Go to http://dean-sergio.info/. Enter D279 in the search box to learn more about \"Cough: Care Instructions. \"  Current as of: May 12, 2017  Content Version: 11.4  © 2649-9076 [employer]. Care instructions adapted under license by [employer] (which disclaims liability or warranty for this information). If you have questions about a medical condition or this instruction, always ask your healthcare professional. [address] any warranty or liability for your use of this information.

## 2018-01-25 NOTE — ED PROVIDER NOTES
EMERGENCY DEPARTMENT HISTORY AND PHYSICAL EXAM      Date: 1/25/2018  Patient Name: Kevon Cruz II    History of Presenting Illness     Chief Complaint   Patient presents with    Shortness of Breath     SOB and chest discomfort. He was here 2 weeks ago and diagnosed with bronchitis but it isn't getting any better. History Provided By: Patient    HPI: Kevon Cruz II, 63 y.o. male with PMHx significant for HTN, CVA, Afib, DM, presents ambulatory to the ED with cc of persistent shortness of breath and non-productive cough with constant pressure/tightness chest pain x2 weeks. Pt reports associated headache, intermittent low-grade fevers, and mild diarrhea. Of note, the pt was seen on 1/12 for the same sxs, with a negative chest x-ray, and workup. He reports taking the prescribed Tussionex as well as Nyquil without relief. Pt specifically denies any chills, congestion, abdominal pain, nausea, vomiting, dysuria, or urinary frequency. PMHx: Significant for HTN, CVA, Afib, DM, seizures, GERD  PSHx: Significant for Gastric Bypass, hernia repair   Social Hx: -tobacco (former), -EtOH, -Illicit Drugs       PCP: Alejandro Medina MD    There are no other complaints, changes, or physical findings at this time. Current Facility-Administered Medications   Medication Dose Route Frequency Provider Last Rate Last Dose    albuterol (PROVENTIL VENTOLIN) 2.5 mg /3 mL (0.083 %) nebulizer solution              Current Outpatient Prescriptions   Medication Sig Dispense Refill    chlorpheniramine-HYDROcodone (TUSSIONEX PENNKINETIC ER) 10-8 mg/5 mL suspension Take 5 mL by mouth every twelve (12) hours as needed for Cough. Max Daily Amount: 10 mL. 60 mL 0    albuterol (PROVENTIL HFA, VENTOLIN HFA, PROAIR HFA) 90 mcg/actuation inhaler Take 2 Puffs by inhalation every four (4) hours as needed for Wheezing. 1 Inhaler 0    predniSONE (DELTASONE) 10 mg tablet Take 3 Tabs by mouth daily (with breakfast) for 3 days.  9 Tab 0    metFORMIN (GLUCOPHAGE) 500 mg tablet Take 500 mg by mouth.  sildenafil citrate (VIAGRA) 100 mg tablet Take 100 mg by mouth.  rivaroxaban (XARELTO) 20 mg tab tablet Take  by mouth daily.  divalproex DR (DEPAKOTE) 500 mg tablet Take 1 tablet by mouth two (2) times a day. 90 tablet 3    metoprolol (LOPRESSOR) 100 mg tablet Take 1 tablet by mouth two (2) times a day. 1 tablet 0    simethicone (MYLICON) 80 mg chewable tablet Take 1 tablet by mouth four (4) times daily as needed. 30 tablet 0    omeprazole (PRILOSEC) 20 mg capsule Take 1 capsule by mouth daily. 30 capsule 0    aspirin delayed-release 81 mg tablet Take 81 mg by mouth daily.  LISINOPRIL PO Take 20 mg by mouth. Dose Unknown      citalopram (CELEXA) 20 mg tablet Take 0.5 Tabs by mouth daily. 30 Tab 3    alprazolam (XANAX) 2 mg tablet Take 2 mg by mouth two (2) times a day.          Past History     Past Medical History:  Past Medical History:   Diagnosis Date    Acquired lactose intolerance 12/1/2010    Atrial fibrillation ([address] 12/1/2010    Atrial septal aneurysm 3/12/2011    Depression     Diabetes ([facility] 10/26/01    LOPEZ (dyspnea on exertion) 10/26/01    Essential hypertension     GERD (gastroesophageal reflux disease) 10/26/01    Headache(784.0) 10/26/01    Hypertension     IHSS (idiopathic hypertrophic subaortic stenosis) ([facility] 12/1/2010    Morbid obesity ([address] 10/26/01    Other ill-defined conditions(799.89)     sleep apnea    Other ill-defined conditions(799.89)     migraines    Pneumonia     Psychiatric disorder     Depression since 2005    Secondary seizure disorder ([address] 12/1/2010    Seizures ([address]     since 2005    Sleep apnea 10/26/01    uses FELIPE pap    Stroke [facility])     TIA 2004    Syncope 2/7/2011    asystole (long) when getting [place] line at [address], was admitted for pneumonia       Past Surgical History:  Past Surgical History:   Procedure Laterality Date    ABDOMEN SURGERY PROC UNLISTED      GASTRIC BYPASS,OBESE<150CM TODD-EN-Y  1/7/02    Dr Dajuan Nelson    HX CARPAL TUNNEL RELEASE      HX GI      lap gastric bypass 1/7/02    HX GYN      cyst    HX ORTHOPAEDIC      carpal tunnel    HX OTHER SURGICAL      cyst removed from groin    HX OTHER SURGICAL      hernia repair       Family History:  Family History   Problem Relation Age of Onset    Hypertension Mother     Heart Disease Mother     Stroke Mother     Cancer Mother     Diabetes Father     Hypertension Father     Sickle Cell Trait Sister     Hypertension Sister     Bleeding Prob Sister     Hypertension Brother        Social History:  Social History   Substance Use Topics    Smoking status: Former Smoker    Smokeless tobacco: Never Used    Alcohol use 1.8 oz/week     3 Glasses of wine per week      Comment: occas       Allergies: Allergies   Allergen Reactions    Pcn [Penicillins] Anaphylaxis, Hives and Itching                Review of Systems   Review of Systems   Constitutional: Positive for fever. Negative for activity change, appetite change, chills and unexpected weight change. HENT: Negative for congestion. Eyes: Negative for pain and visual disturbance. Respiratory: Positive for cough and shortness of breath. Cardiovascular: Positive for chest pain. Gastrointestinal: Positive for diarrhea. Negative for abdominal pain, nausea and vomiting. Genitourinary: Negative for dysuria. Musculoskeletal: Negative for back pain. Skin: Negative for rash. Neurological: Positive for headaches. Physical Exam   Physical Exam   Constitutional: He is oriented to person, place, and time. He appears well-developed and well-nourished. Obese male in mild distress   HENT:   Head: Normocephalic and atraumatic. Mouth/Throat: Oropharynx is clear and moist.   Eyes: Conjunctivae and EOM are normal. Pupils are equal, round, and reactive to light. Right eye exhibits no discharge. Left eye exhibits no discharge.    Neck: Normal range of motion. Neck supple. Cardiovascular: Normal rate, regular rhythm and normal heart sounds. No murmur heard. Pulmonary/Chest: Effort normal. No respiratory distress. He has wheezes (diffuse). He has no rales. Abdominal: Soft. Bowel sounds are normal. He exhibits no distension. There is no tenderness. Musculoskeletal: Normal range of motion. +1 BLE pitting edema    Neurological: He is alert and oriented to person, place, and time. No cranial nerve deficit. He exhibits normal muscle tone. Skin: Skin is warm and dry. No rash noted. He is not diaphoretic. Nursing note and vitals reviewed. Diagnostic Study Results     Labs -     Recent Results (from the past 12 hour(s))   EKG, 12 LEAD, INITIAL    Collection Time: 01/25/18 12:06 AM   Result Value Ref Range    Ventricular Rate 109 BPM    Atrial Rate 109 BPM    P-R Interval 162 ms    QRS Duration 96 ms    Q-T Interval 352 ms    QTC Calculation (Bezet) 474 ms    Calculated P Axis 16 degrees    Calculated R Axis 23 degrees    Calculated T Axis 55 degrees    Diagnosis       Sinus tachycardia  Otherwise normal ECG  When compared with ECG of 12-JAN-2018 12:02,  premature atrial complexes are no longer present     CBC WITH AUTOMATED DIFF    Collection Time: 01/25/18 12:47 AM   Result Value Ref Range    WBC 7.0 4.1 - 11.1 K/uL    RBC 5.80 (H) 4.10 - 5.70 M/uL    HGB 13.2 12.1 - 17.0 g/dL    HCT 41.1 36.6 - 50.3 %    MCV 70.9 (L) 80.0 - 99.0 FL    MCH 22.8 (L) 26.0 - 34.0 PG    MCHC 32.1 30.0 - 36.5 g/dL    RDW 15.0 (H) 11.5 - 14.5 %    PLATELET 851 102 - 864 K/uL    NEUTROPHILS 69 32 - 75 %    LYMPHOCYTES 24 12 - 49 %    MONOCYTES 7 5 - 13 %    EOSINOPHILS 0 0 - 7 %    BASOPHILS 0 0 - 1 %    ABS. NEUTROPHILS 4.8 1.8 - 8.0 K/UL    ABS. LYMPHOCYTES 1.7 0.8 - 3.5 K/UL    ABS. MONOCYTES 0.5 0.0 - 1.0 K/UL    ABS. EOSINOPHILS 0.0 0.0 - 0.4 K/UL    ABS.  BASOPHILS 0.0 0.0 - 0.1 K/UL    DF SMEAR SCANNED      RBC COMMENTS MICROCYTOSIS  1+       METABOLIC PANEL, BASIC Collection Time: 01/25/18 12:47 AM   Result Value Ref Range    Sodium 136 136 - 145 mmol/L    Potassium 3.5 3.5 - 5.1 mmol/L    Chloride 100 97 - 108 mmol/L    CO2 29 21 - 32 mmol/L    Anion gap 7 5 - 15 mmol/L    Glucose 360 (H) 65 - 100 mg/dL    BUN 10 6 - 20 MG/DL    Creatinine 0.92 0.70 - 1.30 MG/DL    BUN/Creatinine ratio 11 (L) 12 - 20      GFR est AA >60 >60 ml/min/1.73m2    GFR est non-AA >60 >60 ml/min/1.73m2    Calcium 9.2 8.5 - 10.1 MG/DL   NT-PRO BNP    Collection Time: 01/25/18 12:47 AM   Result Value Ref Range    NT pro- (H) 0 - 125 PG/ML   TROPONIN I    Collection Time: 01/25/18 12:47 AM   Result Value Ref Range    Troponin-I, Qt. <0.04 <0.05 ng/mL       Radiologic Studies -     CXR Results  (Last 48 hours)               01/25/18 0111  XR CHEST PA LAT Final result    Impression:  IMPRESSION: No acute findings. Narrative:  EXAM:  XR CHEST PA LAT       INDICATION:  Prolonged cough without resolution after treatment       COMPARISON: None. FINDINGS: PA and lateral radiographs of the chest demonstrate clear lungs. The   cardiac and mediastinal contours and pulmonary vascularity are normal.  The   bones and soft tissues are within normal limits. No acute findings in the   visualized upper abdomen with note of cholecystectomy clips. Medical Decision Making   I am the first provider for this patient. I reviewed the vital signs, available nursing notes, past medical history, past surgical history, family history and social history. Vital Signs-Reviewed the patient's vital signs. Patient Vitals for the past 12 hrs:   Temp Pulse Resp BP SpO2   01/25/18 0114 - 97 17 - 99 %   01/25/18 0110 - - - (!) 153/95 -   01/25/18 0003 97.8 °F (36.6 °C) (!) 105 20 (!) 158/109 100 %       Pulse Oximetry Analysis - 100% on RA    Cardiac Monitor:   Rate: 97 bpm  Rhythm: Normal Sinus Rhythm      EKG interpretation: (Preliminary) 12:06 AM  Rhythm: sinus tachycardia.  Rate (approx.): 109; Axis: normal; CA interval: normal; QRS interval: normal ; ST/T wave: normal; Other findings: non-ischemic. Written by DENY Green, as dictated by Katie Gilmore MD.    Records Reviewed: Nursing Notes, Old Medical Records, Previous electrocardiograms, Previous Radiology Studies and Previous Laboratory Studies    Provider Notes (Medical Decision Making):   Pt evaluated x2 weeks ago with negative BNP, Troponin, labs and CXR, presents with continued sxs without resolution. Previous hx of asthmatic Bronchitis was not given beta-agonist or steroids. Today's exam shows slight concern of BLE edema. Will recheck BNP and initiate symptomatic medications. Given constant chest tightness, 1 troponin to be sent. ED Course:   Initial assessment performed. The patients presenting problems have been discussed, and they are in agreement with the care plan formulated and outlined with them. I have encouraged them to ask questions as they arise throughout their visit. PROGRESS NOTE:  12:39 AM  Informed by RN of Vtach on monitor. Strip reviewed, do not agree. Patient asx and appeared to be moving. Written by DENY Green, as dictated by Katie Gilmore MD     02:30 Discussed results. Says he still has CP, but breathing slightly improved with medications. Await troponin. Progress note:  3:17 AM  Pt noted to be feeling better, ready for discharge. Updated pt and/or family on all final lab and imaging findings, noting a negative troponin and cxr. Will follow up as instructed. All questions have been answered, pt voiced understanding and agreement with plan. Specific return precautions provided as well as instructions to return to the ED should sx worsen at any time. Vital signs stable for discharge. Written by DENY Green, as dictated by Katie Gilmore MD    Disposition:  Discharge Note:  3:18 AM  The pt is ready for discharge.  The pt's signs, symptoms, diagnosis, and discharge instructions have been discussed and pt has conveyed their understanding. The pt is to follow up as recommended or return to ER should their symptoms worsen. Plan has been discussed and pt is in agreement. PLAN:  1. Current Discharge Medication List      START taking these medications    Details   predniSONE (DELTASONE) 10 mg tablet Take 3 Tabs by mouth daily (with breakfast) for 3 days. Qty: 9 Tab, Refills: 0         CONTINUE these medications which have CHANGED    Details   chlorpheniramine-HYDROcodone (TUSSIONEX PENNKINETIC ER) 10-8 mg/5 mL suspension Take 5 mL by mouth every twelve (12) hours as needed for Cough. Max Daily Amount: 10 mL. Qty: 60 mL, Refills: 0    Associated Diagnoses: Cough      albuterol (PROVENTIL HFA, VENTOLIN HFA, PROAIR HFA) 90 mcg/actuation inhaler Take 2 Puffs by inhalation every four (4) hours as needed for Wheezing. Qty: 1 Inhaler, Refills: 0         CONTINUE these medications which have NOT CHANGED    Details   metFORMIN (GLUCOPHAGE) 500 mg tablet Take 500 mg by mouth.      sildenafil citrate (VIAGRA) 100 mg tablet Take 100 mg by mouth.      rivaroxaban (XARELTO) 20 mg tab tablet Take  by mouth daily. divalproex DR (DEPAKOTE) 500 mg tablet Take 1 tablet by mouth two (2) times a day. Qty: 90 tablet, Refills: 3    Associated Diagnoses: Seizures (HCC)      metoprolol (LOPRESSOR) 100 mg tablet Take 1 tablet by mouth two (2) times a day. Qty: 1 tablet, Refills: 0      simethicone (MYLICON) 80 mg chewable tablet Take 1 tablet by mouth four (4) times daily as needed. Qty: 30 tablet, Refills: 0      omeprazole (PRILOSEC) 20 mg capsule Take 1 capsule by mouth daily. Qty: 30 capsule, Refills: 0      aspirin delayed-release 81 mg tablet Take 81 mg by mouth daily. LISINOPRIL PO Take 20 mg by mouth. Dose Unknown    Associated Diagnoses: Headache(784.0); Seizure ([address]      citalopram (CELEXA) 20 mg tablet Take 0.5 Tabs by mouth daily.   Qty: 30 Tab, Refills: 3    Associated Diagnoses: Depression      alprazolam (XANAX) 2 mg tablet Take 2 mg by mouth two (2) times a day. STOP taking these medications       oxyCODONE-acetaminophen (PERCOCET) 5-325 mg per tablet Comments:   Reason for Stoppin.   Follow-up Information     Follow up With Details Comments Contact Info    [facility] EMERGENCY DEPT  If symptoms worsen [address] Yamile Wright MD Call today to schedule a recheck exam [address] [facility]  [address] [place] [address]  [phone]          Return to ED if worse     Diagnosis     Clinical Impression:   1. Shortness of breath    2. Acute chest pain    3. Cough        Attestations: This note is prepared by Daisy Marino, acting as Scribe for Juan Walden MD      The scribe's documentation has been prepared under my direction and personally reviewed by me in its entirety. I confirm that the note above accurately reflects all work, treatment, procedures, and medical decision making performed by me. Juan Walden MD        This note will not be viewable in [address]

## 2018-01-25 NOTE — ED NOTES
Pt discharged by [facility]. Pt provided with discharge instructions Rx and instructions on follow up care. Pt out of ED under own power steady gait accompanied by self.

## 2018-01-25 NOTE — ED TRIAGE NOTES
Pt states he has been coughing for x2-3 weeks. Was seen and treated for bronchitis, got better with the cough syrup and antibiotics, and the symptoms returned. Pt also reports feeling like an elephant is sitting on his chest. Productive cough with yellow sputum. Also has a headache. Highest fever was 101. Pt tried Nyquil and nitro without relief.

## 2018-02-10 ENCOUNTER — APPOINTMENT (OUTPATIENT)
Dept: CT IMAGING | Age: 63
End: 2018-02-10
Attending: EMERGENCY MEDICINE
Payer: MEDICARE

## 2018-02-10 ENCOUNTER — HOSPITAL ENCOUNTER (EMERGENCY)
Age: 63
Discharge: HOME | End: 2018-02-10
Attending: EMERGENCY MEDICINE
Payer: MEDICARE

## 2018-02-10 ENCOUNTER — APPOINTMENT (OUTPATIENT)
Dept: GENERAL RADIOLOGY | Age: 63
End: 2018-02-10
Attending: EMERGENCY MEDICINE
Payer: MEDICARE

## 2018-02-10 VITALS
WEIGHT: 307.98 LBS | SYSTOLIC BLOOD PRESSURE: 131 MMHG | HEIGHT: 67 IN | BODY MASS INDEX: 48.34 KG/M2 | OXYGEN SATURATION: 97 % | RESPIRATION RATE: 27 BRPM | HEART RATE: 91 BPM | DIASTOLIC BLOOD PRESSURE: 82 MMHG | TEMPERATURE: 98.9 F

## 2018-02-10 DIAGNOSIS — G44.209 TENSION HEADACHE: ICD-10-CM

## 2018-02-10 DIAGNOSIS — J45.21 MILD INTERMITTENT ASTHMA WITH ACUTE EXACERBATION: ICD-10-CM

## 2018-02-10 DIAGNOSIS — K85.90 ACUTE PANCREATITIS WITHOUT INFECTION OR NECROSIS, UNSPECIFIED PANCREATITIS TYPE: ICD-10-CM

## 2018-02-10 DIAGNOSIS — R68.89 FLU-LIKE SYMPTOMS: Primary | ICD-10-CM

## 2018-02-10 DIAGNOSIS — E86.0 DEHYDRATION: ICD-10-CM

## 2018-02-10 LAB
ALBUMIN SERPL-MCNC: 2.7 G/DL (ref 3.5–5)
ALBUMIN/GLOB SERPL: 0.7 {RATIO} (ref 1.1–2.2)
ALP SERPL-CCNC: 139 U/L (ref 45–117)
ALT SERPL-CCNC: 18 U/L (ref 12–78)
ANION GAP SERPL CALC-SCNC: 4 MMOL/L (ref 5–15)
APPEARANCE UR: ABNORMAL
AST SERPL-CCNC: 14 U/L (ref 15–37)
BACTERIA URNS QL MICRO: NEGATIVE /HPF
BASOPHILS # BLD: 0 K/UL (ref 0–0.1)
BASOPHILS NFR BLD: 0 % (ref 0–1)
BILIRUB SERPL-MCNC: 0.4 MG/DL (ref 0.2–1)
BILIRUB UR QL CFM: NEGATIVE
BNP SERPL-MCNC: 124 PG/ML (ref 0–125)
BUN SERPL-MCNC: 10 MG/DL (ref 6–20)
BUN/CREAT SERPL: 12 (ref 12–20)
CALCIUM SERPL-MCNC: 8.5 MG/DL (ref 8.5–10.1)
CHLORIDE SERPL-SCNC: 104 MMOL/L (ref 97–108)
CK SERPL-CCNC: 83 U/L (ref 39–308)
CO2 SERPL-SCNC: 27 MMOL/L (ref 21–32)
COLOR UR: ABNORMAL
CREAT SERPL-MCNC: 0.81 MG/DL (ref 0.7–1.3)
DIFFERENTIAL METHOD BLD: ABNORMAL
EOSINOPHIL # BLD: 0.1 K/UL (ref 0–0.4)
EOSINOPHIL NFR BLD: 1 % (ref 0–7)
EPITH CASTS URNS QL MICRO: ABNORMAL /LPF
ERYTHROCYTE [DISTWIDTH] IN BLOOD BY AUTOMATED COUNT: 16.1 % (ref 11.5–14.5)
GLOBULIN SER CALC-MCNC: 4.1 G/DL (ref 2–4)
GLUCOSE SERPL-MCNC: 302 MG/DL (ref 65–100)
GLUCOSE UR STRIP.AUTO-MCNC: >1000 MG/DL
HCT VFR BLD AUTO: 44.9 % (ref 36.6–50.3)
HGB BLD-MCNC: 13.7 G/DL (ref 12.1–17)
HGB UR QL STRIP: NEGATIVE
HYALINE CASTS URNS QL MICRO: ABNORMAL /LPF (ref 0–5)
IMM GRANULOCYTES # BLD: 0 K/UL (ref 0–0.04)
IMM GRANULOCYTES NFR BLD AUTO: 0 % (ref 0–0.5)
INR PPP: 1.1 (ref 0.9–1.1)
KETONES UR QL STRIP.AUTO: 15 MG/DL
LACTATE SERPL-SCNC: 1 MMOL/L (ref 0.4–2)
LEUKOCYTE ESTERASE UR QL STRIP.AUTO: NEGATIVE
LIPASE SERPL-CCNC: 814 U/L (ref 73–393)
LYMPHOCYTES # BLD: 1.3 K/UL (ref 0.8–3.5)
LYMPHOCYTES NFR BLD: 22 % (ref 12–49)
MAGNESIUM SERPL-MCNC: 1.8 MG/DL (ref 1.6–2.4)
MCH RBC QN AUTO: 22.3 PG (ref 26–34)
MCHC RBC AUTO-ENTMCNC: 30.5 G/DL (ref 30–36.5)
MCV RBC AUTO: 73.2 FL (ref 80–99)
MONOCYTES # BLD: 0.4 K/UL (ref 0–1)
MONOCYTES NFR BLD: 7 % (ref 5–13)
NEUTS SEG # BLD: 3.9 K/UL (ref 1.8–8)
NEUTS SEG NFR BLD: 70 % (ref 32–75)
NITRITE UR QL STRIP.AUTO: NEGATIVE
NRBC # BLD: 0 K/UL (ref 0–0.01)
NRBC BLD-RTO: 0 PER 100 WBC
PH UR STRIP: 5 [PH] (ref 5–8)
PLATELET # BLD AUTO: 194 K/UL (ref 150–400)
PLATELET COMMENTS,PCOM: ABNORMAL
PMV BLD AUTO: ABNORMAL FL (ref 8.9–12.9)
POTASSIUM SERPL-SCNC: 4 MMOL/L (ref 3.5–5.1)
PROT SERPL-MCNC: 6.8 G/DL (ref 6.4–8.2)
PROT UR STRIP-MCNC: 30 MG/DL
PROTHROMBIN TIME: 11.5 SEC (ref 9–11.1)
RBC # BLD AUTO: 6.13 M/UL (ref 4.1–5.7)
RBC #/AREA URNS HPF: ABNORMAL /HPF (ref 0–5)
RBC MORPH BLD: ABNORMAL
RBC MORPH BLD: ABNORMAL
SODIUM SERPL-SCNC: 135 MMOL/L (ref 136–145)
SP GR UR REFRACTOMETRY: >1.03 (ref 1–1.03)
TROPONIN I SERPL-MCNC: <0.04 NG/ML
UROBILINOGEN UR QL STRIP.AUTO: 1 EU/DL (ref 0.2–1)
WBC # BLD AUTO: 5.7 K/UL (ref 4.1–11.1)
WBC URNS QL MICRO: ABNORMAL /HPF (ref 0–4)

## 2018-02-10 PROCEDURE — 83735 ASSAY OF MAGNESIUM: CPT | Performed by: EMERGENCY MEDICINE

## 2018-02-10 PROCEDURE — 85025 COMPLETE CBC W/AUTO DIFF WBC: CPT | Performed by: EMERGENCY MEDICINE

## 2018-02-10 PROCEDURE — 83605 ASSAY OF LACTIC ACID: CPT | Performed by: EMERGENCY MEDICINE

## 2018-02-10 PROCEDURE — 71045 X-RAY EXAM CHEST 1 VIEW: CPT

## 2018-02-10 PROCEDURE — 96374 THER/PROPH/DIAG INJ IV PUSH: CPT

## 2018-02-10 PROCEDURE — 80053 COMPREHEN METABOLIC PANEL: CPT | Performed by: EMERGENCY MEDICINE

## 2018-02-10 PROCEDURE — 74011250637 HC RX REV CODE- 250/637: Performed by: EMERGENCY MEDICINE

## 2018-02-10 PROCEDURE — 81001 URINALYSIS AUTO W/SCOPE: CPT | Performed by: EMERGENCY MEDICINE

## 2018-02-10 PROCEDURE — 77030029684 HC NEB SM VOL KT MONA -A

## 2018-02-10 PROCEDURE — 99285 EMERGENCY DEPT VISIT HI MDM: CPT

## 2018-02-10 PROCEDURE — 70450 CT HEAD/BRAIN W/O DYE: CPT

## 2018-02-10 PROCEDURE — 85610 PROTHROMBIN TIME: CPT | Performed by: EMERGENCY MEDICINE

## 2018-02-10 PROCEDURE — 83880 ASSAY OF NATRIURETIC PEPTIDE: CPT | Performed by: EMERGENCY MEDICINE

## 2018-02-10 PROCEDURE — 94640 AIRWAY INHALATION TREATMENT: CPT

## 2018-02-10 PROCEDURE — 82550 ASSAY OF CK (CPK): CPT | Performed by: EMERGENCY MEDICINE

## 2018-02-10 PROCEDURE — 84484 ASSAY OF TROPONIN QUANT: CPT | Performed by: EMERGENCY MEDICINE

## 2018-02-10 PROCEDURE — 96361 HYDRATE IV INFUSION ADD-ON: CPT

## 2018-02-10 PROCEDURE — 87040 BLOOD CULTURE FOR BACTERIA: CPT | Performed by: EMERGENCY MEDICINE

## 2018-02-10 PROCEDURE — 96375 TX/PRO/DX INJ NEW DRUG ADDON: CPT

## 2018-02-10 PROCEDURE — 83690 ASSAY OF LIPASE: CPT | Performed by: EMERGENCY MEDICINE

## 2018-02-10 PROCEDURE — 93005 ELECTROCARDIOGRAM TRACING: CPT

## 2018-02-10 PROCEDURE — 36415 COLL VENOUS BLD VENIPUNCTURE: CPT | Performed by: EMERGENCY MEDICINE

## 2018-02-10 PROCEDURE — 96376 TX/PRO/DX INJ SAME DRUG ADON: CPT

## 2018-02-10 PROCEDURE — 74011000250 HC RX REV CODE- 250: Performed by: EMERGENCY MEDICINE

## 2018-02-10 PROCEDURE — 74011250636 HC RX REV CODE- 250/636: Performed by: EMERGENCY MEDICINE

## 2018-02-10 RX ORDER — ONDANSETRON 2 MG/ML
4 INJECTION INTRAMUSCULAR; INTRAVENOUS
Status: COMPLETED | OUTPATIENT
Start: 2018-02-10 | End: 2018-02-10

## 2018-02-10 RX ORDER — MORPHINE SULFATE 2 MG/ML
6 INJECTION, SOLUTION INTRAMUSCULAR; INTRAVENOUS
Status: COMPLETED | OUTPATIENT
Start: 2018-02-10 | End: 2018-02-10

## 2018-02-10 RX ORDER — METHOCARBAMOL 750 MG/1
750 TABLET, FILM COATED ORAL
Qty: 20 TAB | Refills: 0 | Status: SHIPPED | OUTPATIENT
Start: 2018-02-10 | End: 2018-07-11

## 2018-02-10 RX ORDER — OSELTAMIVIR PHOSPHATE 75 MG/1
75 CAPSULE ORAL 2 TIMES DAILY
Qty: 10 CAP | Refills: 0 | Status: SHIPPED | OUTPATIENT
Start: 2018-02-10 | End: 2018-02-15

## 2018-02-10 RX ORDER — HYDROCODONE POLISTIREX AND CHLORPHENIRAMINE POLISTIREX 10; 8 MG/5ML; MG/5ML
5 SUSPENSION, EXTENDED RELEASE ORAL
Qty: 60 ML | Refills: 0 | Status: SHIPPED | OUTPATIENT
Start: 2018-02-10

## 2018-02-10 RX ORDER — DIAZEPAM 5 MG/1
5 TABLET ORAL
Status: COMPLETED | OUTPATIENT
Start: 2018-02-10 | End: 2018-02-10

## 2018-02-10 RX ORDER — ALBUTEROL SULFATE 2.5 MG/.5ML
5 SOLUTION RESPIRATORY (INHALATION)
Status: COMPLETED | OUTPATIENT
Start: 2018-02-10 | End: 2018-02-10

## 2018-02-10 RX ORDER — ALBUTEROL SULFATE 90 UG/1
2 AEROSOL, METERED RESPIRATORY (INHALATION)
Qty: 1 INHALER | Refills: 0 | Status: SHIPPED | OUTPATIENT
Start: 2018-02-10

## 2018-02-10 RX ORDER — FENTANYL CITRATE 50 UG/ML
100 INJECTION, SOLUTION INTRAMUSCULAR; INTRAVENOUS
Status: COMPLETED | OUTPATIENT
Start: 2018-02-10 | End: 2018-02-10

## 2018-02-10 RX ADMIN — ALBUTEROL SULFATE 5 MG: 2.5 SOLUTION RESPIRATORY (INHALATION) at 15:19

## 2018-02-10 RX ADMIN — DIAZEPAM 5 MG: 5 TABLET ORAL at 15:16

## 2018-02-10 RX ADMIN — ONDANSETRON HYDROCHLORIDE 4 MG: 2 INJECTION, SOLUTION INTRAMUSCULAR; INTRAVENOUS at 15:14

## 2018-02-10 RX ADMIN — MORPHINE SULFATE 4 MG: 2 INJECTION, SOLUTION INTRAMUSCULAR; INTRAVENOUS at 15:24

## 2018-02-10 RX ADMIN — FENTANYL CITRATE 100 MCG: 50 INJECTION, SOLUTION INTRAMUSCULAR; INTRAVENOUS at 13:17

## 2018-02-10 RX ADMIN — ONDANSETRON HYDROCHLORIDE 4 MG: 2 INJECTION, SOLUTION INTRAMUSCULAR; INTRAVENOUS at 13:17

## 2018-02-10 RX ADMIN — SODIUM CHLORIDE 1000 ML: 900 INJECTION, SOLUTION INTRAVENOUS at 15:12

## 2018-02-10 NOTE — DISCHARGE INSTRUCTIONS
Asthma Attack: Care Instructions  Your Care Instructions    During an asthma attack, the airways swell and narrow. This makes it hard to breathe. Severe asthma attacks can be life-threatening, but you can help prevent them by keeping your asthma under control and treating symptoms before they get bad. Symptoms include being short of breath, having chest tightness, coughing, and wheezing. Noting and treating these symptoms can also help you avoid future trips to the emergency room. The doctor has checked you carefully, but problems can develop later. If you notice any problems or new symptoms, get medical treatment right away. Follow-up care is a key part of your treatment and safety. Be sure to make and go to all appointments, and call your doctor if you are having problems. It's also a good idea to know your test results and keep a list of the medicines you take. How can you care for yourself at home? · Follow your asthma action plan to prevent and treat attacks. If you don't have an asthma action plan, work with your doctor to create one. · Take your asthma medicines exactly as prescribed. Talk to your doctor right away if you have any questions about how to take them. ¨ Use your quick-relief medicine when you have symptoms of an attack. Quick-relief medicine is usually an albuterol inhaler. Some people need to use quick-relief medicine before they exercise. ¨ Take your controller medicine every day, not just when you have symptoms. Controller medicine is usually an inhaled corticosteroid. The goal is to prevent problems before they occur. Don't use your controller medicine to treat an attack that has already started. It doesn't work fast enough to help. ¨ If your doctor prescribed corticosteroid pills to use during an attack, take them exactly as prescribed. It may take hours for the pills to work, but they may make the episode shorter and help you breathe better.   ¨ Keep your quick-relief medicine with you at all times. · Talk to your doctor before using other medicines. Some medicines, such as aspirin, can cause asthma attacks in some people. · If you have a peak flow meter, use it to check how well you are breathing. This can help you predict when an asthma attack is going to occur. Then you can take medicine to prevent the asthma attack or make it less severe. · Do not smoke or allow others to smoke around you. Avoid smoky places. Smoking makes asthma worse. If you need help quitting, talk to your doctor about stop-smoking programs and medicines. These can increase your chances of quitting for good. · Learn what triggers an asthma attack for you, and avoid the triggers when you can. Common triggers include colds, smoke, air pollution, dust, pollen, mold, pets, cockroaches, stress, and cold air. · Avoid colds and the flu. Get a pneumococcal vaccine shot. If you have had one before, ask your doctor if you need a second dose. Get a flu vaccine every fall. If you must be around people with colds or the flu, wash your hands often. When should you call for help? Call 911 anytime you think you may need emergency care. For example, call if:  ? · You have severe trouble breathing. ?Call your doctor now or seek immediate medical care if:  ? · Your symptoms do not get better after you have followed your asthma action plan. ? · You have new or worse trouble breathing. ? · Your coughing and wheezing get worse. ? · You cough up dark brown or bloody mucus (sputum). ? · You have a new or higher fever. ? Watch closely for changes in your health, and be sure to contact your doctor if:  ? · You need to use quick-relief medicine on more than 2 days a week (unless it is just for exercise). ? · You cough more deeply or more often, especially if you notice more mucus or a change in the color of your mucus. ? · You are not getting better as expected. Where can you learn more?   Go to http://dean-sergio.info/. Enter E394 in the search box to learn more about \"Asthma Attack: Care Instructions. \"  Current as of: May 12, 2017  Content Version: 11.4  © 9179-3737 [employer]. Care instructions adapted under license by [employer] (which disclaims liability or warranty for this information). If you have questions about a medical condition or this instruction, always ask your healthcare professional. [place] [address] any warranty or liability for your use of this information. Dehydration: Care Instructions  Your Care Instructions  Dehydration happens when your body loses too much fluid. This might happen when you do not drink enough water or you lose large amounts of fluids from your body because of diarrhea, vomiting, or sweating. Severe dehydration can be life-threatening. Water and minerals called electrolytes help put your body fluids back in balance. Learn the early signs of fluid loss, and drink more fluids to prevent dehydration. Follow-up care is a key part of your treatment and safety. Be sure to make and go to all appointments, and call your doctor if you are having problems. It's also a good idea to know your test results and keep a list of the medicines you take. How can you care for yourself at home? · To prevent dehydration, drink plenty of fluids, enough so that your urine is light yellow or clear like water. Choose water and other caffeine-free clear liquids until you feel better. If you have kidney, heart, or liver disease and have to limit fluids, talk with your doctor before you increase the amount of fluids you drink. · If you do not feel like eating or drinking, try taking small sips of water, sports drinks, or other rehydration drinks. · Get plenty of rest.  To prevent dehydration  · Add more fluids to your diet and daily routine, unless your doctor has told you not to.   · During hot weather, drink more fluids. Drink even more fluids if you exercise a lot. Stay away from drinks with alcohol or caffeine. · Watch for the symptoms of dehydration. These include:  ¨ A dry, sticky mouth. ¨ Dark yellow urine, and not much of it. ¨ Dry and sunken eyes. ¨ Feeling very tired. · Learn what problems can lead to dehydration. These include:  ¨ Diarrhea, fever, and vomiting. ¨ Any illness with a fever, such as pneumonia or the flu. ¨ Activities that cause heavy sweating, such as endurance races and heavy outdoor work in hot or humid weather. ¨ Alcohol or drug abuse or withdrawal.  ¨ Certain medicines, such as cold and allergy pills (antihistamines), diet pills (diuretics), and laxatives. ¨ Certain diseases, such as diabetes, cancer, and heart or kidney disease. When should you call for help? Call 911 anytime you think you may need emergency care. For example, call if:  ? · You passed out (lost consciousness). ?Call your doctor now or seek immediate medical care if:  ? · You are confused and cannot think clearly. ? · You are dizzy or lightheaded, or you feel like you may faint. ? · You have signs of needing more fluids. You have sunken eyes and a dry mouth, and you pass only a little dark urine. ? · You cannot keep fluids down. ? Watch closely for changes in your health, and be sure to contact your doctor if:  ? · You are not making tears. ? · Your skin is very dry and sags slowly back into place after you pinch it. ? · Your mouth and eyes are very dry. Where can you learn more? Go to http://dean-sergio.info/. Enter Y297 in the search box to learn more about \"Dehydration: Care Instructions. \"  Current as of: March 20, 2017  Content Version: 11.4  © 3308-6594 [employer] Care instructions adapted under license by [employer] (which disclaims liability or warranty for this information).  If you have questions about a medical condition or this instruction, always ask your healthcare professional. [place] [address] any warranty or liability for your use of this information. Headache: Care Instructions  Your Care Instructions    Headaches have many possible causes. Most headaches aren't a sign of a more serious problem, and they will get better on their own. Home treatment may help you feel better faster. The doctor has checked you carefully, but problems can develop later. If you notice any problems or new symptoms, get medical treatment right away. Follow-up care is a key part of your treatment and safety. Be sure to make and go to all appointments, and call your doctor if you are having problems. It's also a good idea to know your test results and keep a list of the medicines you take. How can you care for yourself at home? · Do not drive if you have taken a prescription pain medicine. · Rest in a quiet, dark room until your headache is gone. Close your eyes and try to relax or go to sleep. Don't watch TV or read. · Put a cold, moist cloth or cold pack on the painful area for 10 to 20 minutes at a time. Put a thin cloth between the cold pack and your skin. · Use a warm, moist towel or a heating pad set on low to relax tight shoulder and neck muscles. · Have someone gently massage your neck and shoulders. · Take pain medicines exactly as directed. ¨ If the doctor gave you a prescription medicine for pain, take it as prescribed. ¨ If you are not taking a prescription pain medicine, ask your doctor if you can take an over-the-counter medicine. · Be careful not to take pain medicine more often than the instructions allow, because you may get worse or more frequent headaches when the medicine wears off. · Do not ignore new symptoms that occur with a headache, such as a fever, weakness or numbness, vision changes, or confusion. These may be signs of a more serious problem.   To prevent headaches  · Keep a headache diary so you can figure out what triggers your headaches. Avoiding triggers may help you prevent headaches. Record when each headache began, how long it lasted, and what the pain was like (throbbing, aching, stabbing, or dull). Write down any other symptoms you had with the headache, such as nausea, flashing lights or dark spots, or sensitivity to bright light or loud noise. Note if the headache occurred near your period. List anything that might have triggered the headache, such as certain foods (chocolate, cheese, wine) or odors, smoke, bright light, stress, or lack of sleep. · Find healthy ways to deal with stress. Headaches are most common during or right after stressful times. Take time to relax before and after you do something that has caused a headache in the past.  · Try to keep your muscles relaxed by keeping good posture. Check your jaw, face, neck, and shoulder muscles for tension, and try relaxing them. When sitting at a desk, change positions often, and stretch for 30 seconds each hour. · Get plenty of sleep and exercise. · Eat regularly and well. Long periods without food can trigger a headache. · Treat yourself to a massage. Some people find that regular massages are very helpful in relieving tension. · Limit caffeine by not drinking too much coffee, tea, or soda. But don't quit caffeine suddenly, because that can also give you headaches. · Reduce eyestrain from computers by blinking frequently and looking away from the computer screen every so often. Make sure you have proper eyewear and that your monitor is set up properly, about an arm's length away. · Seek help if you have depression or anxiety. Your headaches may be linked to these conditions. Treatment can both prevent headaches and help with symptoms of anxiety or depression. When should you call for help? Call 911 anytime you think you may need emergency care. For example, call if:  ? · You have signs of a stroke.  These may include:  ¨ Sudden numbness, paralysis, or weakness in your face, arm, or leg, especially on only one side of your body. ¨ Sudden vision changes. ¨ Sudden trouble speaking. ¨ Sudden confusion or trouble understanding simple statements. ¨ Sudden problems with walking or balance. ¨ A sudden, severe headache that is different from past headaches. ?Call your doctor now or seek immediate medical care if:  ? · You have a new or worse headache. ? · Your headache gets much worse. Where can you learn more? Go to http://dean-sergio.info/. Enter M271 in the search box to learn more about \"Headache: Care Instructions. \"  Current as of: October 14, 2016  Content Version: 11.4  © 1632-4905 [employer]. Care instructions adapted under license by [employer] (which disclaims liability or warranty for this information). If you have questions about a medical condition or this instruction, always ask your healthcare professional. [place] [address] any warranty or liability for your use of this information. Pancreatitis: Care Instructions  Your Care Instructions    The pancreas is an organ behind the stomach. It makes hormones and enzymes to help your body digest food. But if these enzymes attack the pancreas, it can get inflamed. This is called pancreatitis. Most cases are caused by gallstones or by heavy alcohol use. If you take care of yourself at home, it will help you get better. It will also help you avoid more problems with your pancreas. Follow-up care is a key part of your treatment and safety. Be sure to make and go to all appointments, and call your doctor if you are having problems. It's also a good idea to know your test results and keep a list of the medicines you take. How can you care for yourself at home? · Drink clear liquids and eat bland foods until you feel better. [place] foods include rice, dry toast, and crackers.  They also include bananas and applesauce. · Eat a low-fat diet until your doctor says your pancreas is healed. · Do not drink alcohol. Tell your doctor if you need help to quit. Counseling, support groups, and sometimes medicines can help you stay sober. · Be safe with medicines. Read and follow all instructions on the label. ¨ If the doctor gave you a prescription medicine for pain, take it as prescribed. ¨ If you are not taking a prescription pain medicine, ask your doctor if you can take an over-the-counter medicine. · If your doctor prescribed antibiotics, take them as directed. Do not stop taking them just because you feel better. You need to take the full course of antibiotics. · Get extra rest until you feel better. To prevent future problems with your pancreas  · Do not drink alcohol. · Tell your doctors and pharmacist that you've had pancreatitis. They can help you avoid medicines that may cause this problem again. When should you call for help? Call 911 anytime you think you may need emergency care. For example, call if:  ? · You vomit blood or what looks like coffee grounds. ? · Your stools are maroon or very bloody. ?Call your doctor now or seek immediate medical care if:  ? · You have new or worse belly pain. ? · Your stools are black and look like tar, or they have streaks of blood. ? · You are vomiting. ? Watch closely for changes in your health, and be sure to contact your doctor if:  ? · You do not get better as expected. Where can you learn more? Go to http://dean-sergio.info/. Enter O931 in the search box to learn more about \"Pancreatitis: Care Instructions. \"  Current as of: May 12, 2017  Content Version: 11.4  © 5947-5406 [employer]. Care instructions adapted under license by [employer] (which disclaims liability or warranty for this information).  If you have questions about a medical condition or this instruction, always ask your healthcare professional. [employer] disclaims any warranty or liability for your use of this information.

## 2018-02-10 NOTE — ED NOTES
Pt falls asleep very easily even before giving the fentanyl as he did when he got to ED room 13  He awakens easily as well

## 2018-02-10 NOTE — ED PROVIDER NOTES
EMERGENCY DEPARTMENT HISTORY AND PHYSICAL EXAM      Date: 2/10/2018  Patient Name: Linda Lazcano II    History of Presenting Illness     Chief Complaint   Patient presents with    Fever     Pt reports that he has had fever, cough, headache x month and vomiting x last night     Headache    Cough    Chest Pain     L. chest pain x 1 month       History Provided By: Patient    HPI: Linda Lazcano II, 63 y.o. male with PMHx significant for DM, HTN, migraine, seizure, TIA presents ambulatory to the ED with cc of waxing and waning productive cough, SOB, and CP secondary to cough that radiates to his back x 1 month. He reports nausea and vomiting last night. Pt reports diaphoresis at night, increased urinary frequency, and intermittent fever; his last fever was 102 two nights ago that has since resolved. He reports severe 9/10 HA but specifically states it is not a migraine. He notes that he has constant chronic HA after a work injury in the 1990s. Pt reports alternating cyclic episodes of constipation and diarrhea but notes that it may be due to his medications. He reports chronic left leg pain x 1 year. Pt describes the CP as pressure and states that it radiates to his left arm but not to his jaw. He reports his CP is currently 8/10. He reports his last episode of diarrhea was 5 days ago. Pt states he has seen multiple providers over the last month and states he has been diagnosed with \"bronchitis, then PNA, then bronchitis again. \" Pt affirms receiving the flu shot this year. He reports having a nuclear stress test within the last year that was unremarkable. He is currently on a blood thinner. Pt denies abd pain. He denies tobacco or EtOH use. Pt reports FMHx of CA and HTN. PCP: Monserrat Modi MD    There are no other complaints, changes, or physical findings at this time.     Current Outpatient Prescriptions   Medication Sig Dispense Refill    oseltamivir (TAMIFLU) 75 mg capsule Take 1 Cap by mouth two (2) times a day for 5 days. 10 Cap 0    methocarbamol (ROBAXIN-750) 750 mg tablet Take 1 Tab by mouth four (4) times daily as needed. 20 Tab 0    chlorpheniramine-HYDROcodone (TUSSIONEX PENNKINETIC ER) 10-8 mg/5 mL suspension Take 5 mL by mouth every twelve (12) hours as needed for Cough. Max Daily Amount: 10 mL. 60 mL 0    albuterol (PROVENTIL HFA, VENTOLIN HFA, PROAIR HFA) 90 mcg/actuation inhaler Take 2 Puffs by inhalation every four (4) hours as needed for Wheezing. 1 Inhaler 0    metFORMIN (GLUCOPHAGE) 500 mg tablet Take 500 mg by mouth.  sildenafil citrate (VIAGRA) 100 mg tablet Take 100 mg by mouth.  rivaroxaban (XARELTO) 20 mg tab tablet Take  by mouth daily.  divalproex DR (DEPAKOTE) 500 mg tablet Take 1 tablet by mouth two (2) times a day. 90 tablet 3    omeprazole (PRILOSEC) 20 mg capsule Take 1 capsule by mouth daily. 30 capsule 0    LISINOPRIL PO Take 20 mg by mouth. Dose Unknown      citalopram (CELEXA) 20 mg tablet Take 0.5 Tabs by mouth daily. 30 Tab 3    metoprolol (LOPRESSOR) 100 mg tablet Take 1 tablet by mouth two (2) times a day. 1 tablet 0    simethicone (MYLICON) 80 mg chewable tablet Take 1 tablet by mouth four (4) times daily as needed. 30 tablet 0    aspirin delayed-release 81 mg tablet Take 81 mg by mouth daily.          Past History     Past Medical History:  Past Medical History:   Diagnosis Date    Acquired lactose intolerance 12/1/2010    Atrial fibrillation ([facility] 12/1/2010    Atrial septal aneurysm 3/12/2011    Depression     Diabetes ([facility] 10/26/01    LOPEZ (dyspnea on exertion) 10/26/01    Essential hypertension     GERD (gastroesophageal reflux disease) 10/26/01    Headache(784.0) 10/26/01    Hypertension     IHSS (idiopathic hypertrophic subaortic stenosis) ([facility] 12/1/2010    [facility] obesity [facility] 10/26/01    Other ill-defined conditions(799.89)     sleep apnea    Other ill-defined conditions(799.89)     migraines    Pneumonia     Psychiatric disorder     Depression since 2005    Secondary seizure disorder ([facility] 12/1/2010    Seizures ([facility]     since 2005    Sleep apnea 10/26/01    uses C pap    Stroke [facility])     TIA 2004    Syncope 2/7/2011    asystole (long) when getting Picc line at [facility], was admitted for pneumonia       Past Surgical History:  Past Surgical History:   Procedure Laterality Date    ABDOMEN SURGERY PROC UNLISTED      GASTRIC BYPASS,OBESE<150CM TODD-EN-Y  1/7/02    Dr Cecelia Savage HX CARPAL TUNNEL RELEASE      HX GI      lap gastric bypass 1/7/02    HX GYN      cyst    HX ORTHOPAEDIC      carpal tunnel    HX OTHER SURGICAL      cyst removed from groin    HX OTHER SURGICAL      hernia repair       Family History:  Family History   Problem Relation Age of Onset    Hypertension Mother     Heart Disease Mother     Stroke Mother     Cancer Mother     Diabetes Father     Hypertension Father     Sickle Cell Trait Sister     Hypertension Sister     Bleeding Prob Sister     Hypertension Brother        Social History:  Social History   Substance Use Topics    Smoking status: Former Smoker    Smokeless tobacco: Never Used    Alcohol use 1.8 oz/week     3 Glasses of wine per week      Comment: occas       Allergies: Allergies   Allergen Reactions    Pcn [Penicillins] Anaphylaxis, Hives and Itching                Review of Systems   Review of Systems   Constitutional: Positive for diaphoresis and fever. HENT: Negative for congestion. Eyes: Negative for visual disturbance. Respiratory: Positive for cough and shortness of breath. Cardiovascular: Positive for chest pain. Gastrointestinal: Positive for constipation (alternating with diarrhea), diarrhea (alternating with diarrhea), nausea and vomiting. Negative for abdominal pain. Endocrine: Negative for heat intolerance. Genitourinary: Positive for frequency. Musculoskeletal: Positive for back pain (secondary to cough and CP) and myalgias (left leg, chronic). Skin: Negative for rash. Allergic/Immunologic: Negative for immunocompromised state. Neurological: Positive for headaches (chronic). Hematological: Does not bruise/bleed easily. Psychiatric/Behavioral: Negative. All other systems reviewed and are negative. Physical Exam   Physical Exam   Constitutional: He is oriented to person, place, and time. He appears well-developed and well-nourished. Severely elevated BMI. Moderate distress. HENT:   Head: Normocephalic and atraumatic. Eyes: EOM are normal. Pupils are equal, round, and reactive to light. Neck: Normal range of motion. Neck supple. Cardiovascular: Normal rate, regular rhythm, normal heart sounds and intact distal pulses. Pulmonary/Chest: Effort normal and breath sounds normal. He has no wheezes. Chest wall tender but non reproducible. Abdominal: Soft. Bowel sounds are normal. There is no tenderness. Musculoskeletal: Normal range of motion. He exhibits no edema. Neck non-tender. Left calf no edema but tender to palpation. Neurological: He is alert and oriented to person, place, and time. No cranial nerve deficit. Decreased sensation to left face and left leg but motor intact. Skin: Skin is warm and dry. Psychiatric: He has a normal mood and affect. His behavior is normal.   Nursing note and vitals reviewed.         Diagnostic Study Results     Labs -     Recent Results (from the past 12 hour(s))   EKG, 12 LEAD, INITIAL    Collection Time: 02/10/18 11:02 AM   Result Value Ref Range    Ventricular Rate 87 BPM    Atrial Rate 97 BPM    P-R Interval 168 ms    QRS Duration 92 ms    Q-T Interval 378 ms    QTC Calculation (Bezet) 454 ms    Calculated P Axis -3 degrees    Calculated T Axis 37 degrees    Diagnosis       Sinus rhythm with marked sinus arrhythmia  Septal infarct , age undetermined  Abnormal ECG  When compared with ECG of 25-JAN-2018 00:06,  No significant change was found     CBC WITH AUTOMATED DIFF Collection Time: 02/10/18 12:54 PM   Result Value Ref Range    WBC 5.7 4.1 - 11.1 K/uL    RBC 6.13 (H) 4.10 - 5.70 M/uL    HGB 13.7 12.1 - 17.0 g/dL    HCT 44.9 36.6 - 50.3 %    MCV 73.2 (L) 80.0 - 99.0 FL    MCH 22.3 (L) 26.0 - 34.0 PG    MCHC 30.5 30.0 - 36.5 g/dL    RDW 16.1 (H) 11.5 - 14.5 %    PLATELET 703 901 - 510 K/uL    MPV ABNORMAL 8.9 - 12.9 FL    NRBC 0.0 0  WBC    ABSOLUTE NRBC 0.00 0.00 - 0.01 K/uL    NEUTROPHILS 70 32 - 75 %    LYMPHOCYTES 22 12 - 49 %    MONOCYTES 7 5 - 13 %    EOSINOPHILS 1 0 - 7 %    BASOPHILS 0 0 - 1 %    IMMATURE GRANULOCYTES 0 0.0 - 0.5 %    ABS. NEUTROPHILS 3.9 1.8 - 8.0 K/UL    ABS. LYMPHOCYTES 1.3 0.8 - 3.5 K/UL    ABS. MONOCYTES 0.4 0.0 - 1.0 K/UL    ABS. EOSINOPHILS 0.1 0.0 - 0.4 K/UL    ABS. BASOPHILS 0.0 0.0 - 0.1 K/UL    ABS. IMM. GRANS. 0.0 0.00 - 0.04 K/UL    DF MANUAL      PLATELET COMMENTS Large Platelets      RBC COMMENTS MICROCYTOSIS  1+        RBC COMMENTS HYPOCHROMIA  1+       PROTHROMBIN TIME + INR    Collection Time: 02/10/18 12:54 PM   Result Value Ref Range    INR 1.1 0.9 - 1.1      Prothrombin time 11.5 (H) 9.0 - 73.5 sec   METABOLIC PANEL, COMPREHENSIVE    Collection Time: 02/10/18 12:54 PM   Result Value Ref Range    Sodium 135 (L) 136 - 145 mmol/L    Potassium 4.0 3.5 - 5.1 mmol/L    Chloride 104 97 - 108 mmol/L    CO2 27 21 - 32 mmol/L    Anion gap 4 (L) 5 - 15 mmol/L    Glucose 302 (H) 65 - 100 mg/dL    BUN 10 6 - 20 MG/DL    Creatinine 0.81 0.70 - 1.30 MG/DL    BUN/Creatinine ratio 12 12 - 20      GFR est AA >60 >60 ml/min/1.73m2    GFR est non-AA >60 >60 ml/min/1.73m2    Calcium 8.5 8.5 - 10.1 MG/DL    Bilirubin, total 0.4 0.2 - 1.0 MG/DL    ALT (SGPT) 18 12 - 78 U/L    AST (SGOT) 14 (L) 15 - 37 U/L    Alk.  phosphatase 139 (H) 45 - 117 U/L    Protein, total 6.8 6.4 - 8.2 g/dL    Albumin 2.7 (L) 3.5 - 5.0 g/dL    Globulin 4.1 (H) 2.0 - 4.0 g/dL    A-G Ratio 0.7 (L) 1.1 - 2.2     LIPASE    Collection Time: 02/10/18 12:54 PM   Result Value Ref Range    Lipase 814 (H) 73 - 393 U/L   LACTIC ACID    Collection Time: 02/10/18 12:54 PM   Result Value Ref Range    Lactic acid 1.0 0.4 - 2.0 MMOL/L   MAGNESIUM    Collection Time: 02/10/18 12:54 PM   Result Value Ref Range    Magnesium 1.8 1.6 - 2.4 mg/dL   CK    Collection Time: 02/10/18 12:54 PM   Result Value Ref Range    CK 83 39 - 308 U/L   TROPONIN I    Collection Time: 02/10/18 12:54 PM   Result Value Ref Range    Troponin-I, Qt. <0.04 <0.05 ng/mL   NT-PRO BNP    Collection Time: 02/10/18 12:54 PM   Result Value Ref Range    NT pro- 0 - 125 PG/ML   URINALYSIS W/ RFLX MICROSCOPIC    Collection Time: 02/10/18  2:14 PM   Result Value Ref Range    Color YELLOW/STRAW      Appearance CLOUDY (A) CLEAR      Specific gravity >1.030 (H) 1.003 - 1.030    pH (UA) 5.0 5.0 - 8.0      Protein 30 (A) NEG mg/dL    Glucose >1000 (A) NEG mg/dL    Ketone 15 (A) NEG mg/dL    Blood NEGATIVE  NEG      Urobilinogen 1.0 0.2 - 1.0 EU/dL    Nitrites NEGATIVE  NEG      Leukocyte Esterase NEGATIVE  NEG      WBC 0-4 0 - 4 /hpf    RBC 0-5 0 - 5 /hpf    Epithelial cells FEW FEW /lpf    Bacteria NEGATIVE  NEG /hpf    Hyaline cast 2-5 0 - 5 /lpf   BILIRUBIN, CONFIRM    Collection Time: 02/10/18  2:14 PM   Result Value Ref Range    Bilirubin UA, confirm NEGATIVE  NEG         Radiologic Studies -   CT Results  (Last 48 hours)               02/10/18 1333  CT HEAD WO CONT Final result    Impression:  IMPRESSION:        No acute intracranial process               Narrative:  EXAM:  CT HEAD WO CONT   Clinical history: Headaches, facial numbness   INDICATION:   headache/decreased sensation left face and leg       COMPARISON: None. CONTRAST:  None. TECHNIQUE: Unenhanced CT of the head was performed using 5 mm images. Brain and   bone windows were generated. CT dose reduction was achieved through use of a   standardized protocol tailored for this examination and automatic exposure   control for dose modulation.          FINDINGS: The ventricles and sulci are normal in size, shape and configuration and   midline. There is no significant white matter disease. There is no intracranial   hemorrhage, extra-axial collection, mass, mass effect or midline shift. The   basilar cisterns are open. No acute infarct is identified. The bone windows   demonstrate no abnormalities. The visualized portions of the paranasal sinuses   and mastoid air cells are clear. CXR Results  (Last 48 hours)               02/10/18 1321  XR CHEST PORT Final result    Impression:  Impression:   1. Enlarged cardiac silhouette, otherwise no acute disease           Narrative:  INDICATION:  Chest Pain        EXAM: Single portable view of chest 1316 . Comparison: January 25, 2018       Findings: Cardiac silhouette is enlarged. Pulmonary vasculature is not engorged. There are no focal parenchymal opacities, effusions, or pneumothorax. Medical Decision Making   I am the first provider for this patient. I reviewed the vital signs, available nursing notes, past medical history, past surgical history, family history and social history. Vital Signs-Reviewed the patient's vital signs. Patient Vitals for the past 12 hrs:   Temp Pulse Resp BP SpO2   02/10/18 1519 - 87 - - -   02/10/18 1322 - 87 14 151/83 95 %   02/10/18 1110 - 88 14 127/88 95 %   02/10/18 1054 98.9 °F (37.2 °C) 93 16 120/83 97 %       EKG interpretation: (Preliminary) 11:02 AM  Rhythm: sinus rhythm with marked sinus arrhythmia. Rate (approx.): 87; Other findings: no significant change. Written by Donna Salmeron ED Scribe, as dictated by Lesa Staton MD.    Records Reviewed: Old Medical Records    Provider Notes (Medical Decision Making):   DDx: tension HA, viral syndrome, influenza, costochondritis, CAD, PNA, bronchitis, UTI, gastritis, chronic leg pain, electrolyte abnormality, TIA    ED Course:   Initial assessment performed.  The patients presenting problems have been discussed, and they are in agreement with the care plan formulated and outlined with them. I have encouraged them to ask questions as they arise throughout their visit. 2:55 PM  Pt reports feeling a little better. Pt was swabbed for the flu test but because it was an old swab, the lab was unable to process it.      4:32 PM  Pt reports he is feeling much better. Disposition:  Discharge Note:  4:40 PM  The pt is ready for discharge. The pt's signs, symptoms, diagnosis, and discharge instructions have been discussed and pt has conveyed their understanding. The pt is to follow up as recommended or return to ER should their symptoms worsen. Plan has been discussed and pt is in agreement. PLAN:  1. Current Discharge Medication List      START taking these medications    Details   oseltamivir (TAMIFLU) 75 mg capsule Take 1 Cap by mouth two (2) times a day for 5 days. Qty: 10 Cap, Refills: 0      methocarbamol (ROBAXIN-750) 750 mg tablet Take 1 Tab by mouth four (4) times daily as needed. Qty: 20 Tab, Refills: 0      chlorpheniramine-HYDROcodone (TUSSIONEX PENNKINETIC ER) 10-8 mg/5 mL suspension Take 5 mL by mouth every twelve (12) hours as needed for Cough. Max Daily Amount: 10 mL. Qty: 60 mL, Refills: 0    Associated Diagnoses: Flu-like symptoms         CONTINUE these medications which have CHANGED    Details   albuterol (PROVENTIL HFA, VENTOLIN HFA, PROAIR HFA) 90 mcg/actuation inhaler Take 2 Puffs by inhalation every four (4) hours as needed for Wheezing. Qty: 1 Inhaler, Refills: 0           2.    Follow-up Information     Follow up With Details Comments Contact Info    Bridgett Dick MD In 2 days As needed [facility]  [address]  [address]      Lynsey Martinez MD  As needed [facility] [phone] [phone]      [facility] EMERGENCY DEPT  If symptoms worsen [address]   [address]        Return to ED if worse     Diagnosis     Clinical Impression:   1. Flu-like symptoms    2. Tension headache    3. Mild intermittent asthma with acute exacerbation    4. Dehydration    5. Acute pancreatitis without infection or necrosis, unspecified pancreatitis type        Attestations: This note is prepared by Svetlana Mcnair, acting as a Scribe for MD Ana Angela MD: The scribe's documentation has been prepared under my direction and personally reviewed by me in its entirety. I confirm that the notes above accurately reflects all work, treatment, procedures, and medical decision making performed by me.

## 2018-02-10 NOTE — ED NOTES
Pt sitting on side of bed eating  Await his ride he had fentanyl and morphine so he will wait in room for ride

## 2018-02-10 NOTE — ED NOTES
Pt wants crackers.  Pt refused the +6mg morphine pt says the morphine is not his favorite  Pt wants a mask for nebulizer

## 2018-02-12 LAB
ATRIAL RATE: 97 BPM
CALCULATED P AXIS, ECG09: -3 DEGREES
CALCULATED T AXIS, ECG11: 37 DEGREES
DIAGNOSIS, 93000: NORMAL
P-R INTERVAL, ECG05: 168 MS
Q-T INTERVAL, ECG07: 378 MS
QRS DURATION, ECG06: 92 MS
QTC CALCULATION (BEZET), ECG08: 454 MS
VENTRICULAR RATE, ECG03: 87 BPM

## 2018-02-16 LAB
BACTERIA SPEC CULT: NORMAL
SERVICE CMNT-IMP: NORMAL

## 2018-02-21 ENCOUNTER — OFFICE VISIT (OUTPATIENT)
Dept: SLEEP MEDICINE | Age: 63
End: 2018-02-21

## 2018-02-21 VITALS
BODY MASS INDEX: 46.15 KG/M2 | WEIGHT: 294 LBS | DIASTOLIC BLOOD PRESSURE: 92 MMHG | HEIGHT: 67 IN | OXYGEN SATURATION: 92 % | TEMPERATURE: 98.6 F | HEART RATE: 104 BPM | SYSTOLIC BLOOD PRESSURE: 132 MMHG

## 2018-02-21 DIAGNOSIS — G47.33 OSA (OBSTRUCTIVE SLEEP APNEA): Primary | ICD-10-CM

## 2018-02-21 DIAGNOSIS — E66.2 HYPOVENTILATION ASSOCIATED WITH OBESITY (HCC): ICD-10-CM

## 2018-02-21 DIAGNOSIS — F32.A ANXIETY AND DEPRESSION: ICD-10-CM

## 2018-02-21 DIAGNOSIS — I10 ESSENTIAL HYPERTENSION: ICD-10-CM

## 2018-02-21 DIAGNOSIS — F41.9 ANXIETY AND DEPRESSION: ICD-10-CM

## 2018-02-21 DIAGNOSIS — I48.0 PAROXYSMAL ATRIAL FIBRILLATION (HCC): ICD-10-CM

## 2018-02-21 NOTE — PATIENT INSTRUCTIONS
[address] [place]. [place], [address]  Tel.  [phone]  Fax. 100 [facility] 60  Marlen Dior, [address]  Tel.  [phone]  Fax. [phone] [address] [facility] [address] Lisseth Flores  Tel.  [phone]  Fax. [phone]     Sleep Apnea: After Your Visit  Your Care Instructions  Sleep apnea occurs when you frequently stop breathing for 10 seconds or longer during sleep. It can be mild to severe, based on the number of times per hour that you stop breathing or have slowed breathing. Blocked or narrowed airways in your nose, mouth, or throat can cause sleep apnea. Your airway can become blocked when your throat muscles and tongue relax during sleep. Sleep apnea is common, occurring in 1 out of 20 individuals. Individuals having any of the following characteristics should be evaluated and treated right away due to high risk and detrimental consequences from untreated sleep apnea:  1. Obesity  2. Congestive Heart failure  3. Atrial Fibrillation  4. Uncontrolled Hypertension  5. Type II Diabetes  6. Night-time Arrhythmias  7. Stroke  8. Pulmonary Hypertension  9. High-risk Driving Populations (pilots, truck drivers, etc.)  10. Patients Considering Weight-loss Surgery    How do you know you have sleep apnea? You probably have sleep apnea if you answer 'yes' to 3 or more of the following questions:  S - Have you been told that you Snore? T - Are you often Tired during the day? O - Has anyone Observed you stop breathing while sleeping? P- Do you have (or are being treated for) high blood Pressure? B - Are you obese (Body Mass Index > 35)? A - Is your Age 48years old or older? N - Is your Neck size greater than 16 inches? G - Are you male Gender? A sleep physician can prescribe a breathing device that prevents tissues in the throat from blocking your airway.  Or your doctor may recommend using a dental device (oral breathing device) to help keep your airway open. In some cases, surgery may be needed to remove enlarged tissues in the throat. Follow-up care is a key part of your treatment and safety. Be sure to make and go to all appointments, and call your doctor if you are having problems. It's also a good idea to know your test results and keep a list of the medicines you take. How can you care for yourself at home? · Lose weight, if needed. It may reduce the number of times you stop breathing or have slowed breathing. · Go to bed at the same time every night. · Sleep on your side. It may stop mild apnea. If you tend to roll onto your back, sew a pocket in the back of your pajama top. Put a tennis ball into the pocket, and stitch the pocket shut. This will help keep you from sleeping on your back. · Avoid alcohol and medicines such as sleeping pills and sedatives before bed. · Do not smoke. Smoking can make sleep apnea worse. If you need help quitting, talk to your doctor about stop-smoking programs and medicines. These can increase your chances of quitting for good. · Prop up the head of your bed 4 to 6 inches by putting bricks under the legs of the bed. · Treat breathing problems, such as a stuffy nose, caused by a cold or allergies. · Use a continuous positive airway pressure (CPAP) breathing machine if lifestyle changes do not help your apnea and your doctor recommends it. The machine keeps your airway from closing when you sleep. · If CPAP does not help you, ask your doctor whether you should try other breathing machines. A bilevel positive airway pressure machine has two types of air pressureâone for breathing in and one for breathing out. Another device raises or lowers air pressure as needed while you breathe. · If your nose feels dry or bleeds when using one of these machines, talk with your doctor about increasing moisture in the air. A humidifier may help.   · If your nose is runny or stuffy from using a breathing machine, talk with your doctor about using decongestants or a corticosteroid nasal spray. When should you call for help? Watch closely for changes in your health, and be sure to contact your doctor if:  · You still have sleep apnea even though you have made lifestyle changes. · You are thinking of trying a device such as CPAP. · You are having problems using a CPAP or similar machine. Where can you learn more? Go to [facility]. Enter C905 in the search box to learn more about \"Sleep Apnea: After Your Visit. \"   © 2897-3060 Healthwise, Incorporated. Care instructions adapted under license by Tamra Limon (which disclaims liability or warranty for this information). This care instruction is for use with your licensed healthcare professional. If you have questions about a medical condition or this instruction, always ask your healthcare professional. Darra Cam any warranty or liability for your use of this information. PROPER SLEEP HYGIENE    What to avoid  · Do not have drinks with caffeine, such as coffee or black tea, for 8 hours before bed. · Do not smoke or use other types of tobacco near bedtime. Nicotine is a stimulant and can keep you awake. · Avoid drinking alcohol late in the evening, because it can cause you to wake in the middle of the night. · Do not eat a big meal close to bedtime. If you are hungry, eat a light snack. · Do not drink a lot of water close to bedtime, because the need to urinate may wake you up during the night. · Do not read or watch TV in bed. Use the bed only for sleeping and sexual activity. What to try  · Go to bed at the same time every night, and wake up at the same time every morning. Do not take naps during the day. · Keep your bedroom quiet, dark, and cool. · Get regular exercise, but not within 3 to 4 hours of your bedtime. .  · Sleep on a comfortable pillow and mattress.   · If watching the clock makes you anxious, turn it facing away from you so you cannot see the time. · If you worry when you lie down, start a worry book. Well before bedtime, write down your worries, and then set the book and your concerns aside. · Try meditation or other relaxation techniques before you go to bed. · If you cannot fall asleep, get up and go to another room until you feel sleepy. Do something relaxing. Repeat your bedtime routine before you go to bed again. · Make your house quiet and calm about an hour before bedtime. Turn down the lights, turn off the TV, log off the computer, and turn down the volume on music. This can help you relax after a busy day. Drowsy Driving  The [address] [place] [address] Road Traffic Safety Administration cites drowsiness as a causing factor in more than 337,891 police reported crashes annually, resulting in 76,000 injuries and 1,500 deaths. Other surveys suggest 55% of people polled have driven while drowsy in the past year, 23% had fallen asleep but not crashed, 3% crashed, and 2% had and accident due to drowsy driving. Who is at risk? Young Drivers: One study of drowsy driving accidents states that 55% of the drivers were under 25 years. Of those, 75% were male. Shift Workers and Travelers: People who work overnight or travel across time zones frequently are at higher risk of experiencing Circadian Rhythm Disorders. They are trying to work and function when their body is programed to sleep. Sleep Deprived: Lack of sleep has a serious impact on your ability to pay attention or focus on a task. Consistently getting less than the average of 8 hours your body needs creates partial or cumulative sleep deprivation. Untreated Sleep Disorders: Sleep Apnea, Narcolepsy, R.L.S., and other sleep disorders (untreated) prevent a person from getting enough restful sleep. This leads to excessive daytime sleepiness and increases the risk for drowsy driving accidents by up to 7 times.   Medications / Alcohol: Even over the counter medications can cause drowsiness. Medications that impair a drivers attention should have a warning label. Alcohol naturally makes you sleepy and on its own can cause accidents. Combined with excessive drowsiness its effects are amplified. Signs of Drowsy Driving:   * You don't remember driving the last few miles   * You may drift out of your surya   * You are unable to focus and your thoughts wander   * You may yawn more often than normal   * You have difficulty keeping your eyes open / nodding off   * Missing traffic signs, speeding, or tailgating  Prevention-   Good sleep hygiene, lifestyle and behavioral choices have the most impact on drowsy driving. There is no substitute for sleep and the average person requires 8 hours nightly. If you find yourself driving drowsy, stop and sleep. Consider the sleep hygiene tips provided during your visit as well. Medication Refill Policy: Refills for all medications require 1 week advance notice. Please have your pharmacy fax a refill request. We are unable to fax, or call in \"controled substance\" medications and you will need to pick these prescriptions up from our office. InstantQ Activation    Thank you for requesting access to InstantQ. Please follow the instructions below to securely access and download your online medical record. InstantQ allows you to send messages to your doctor, view your test results, renew your prescriptions, schedule appointments, and more. How Do I Sign Up? 1. In your internet browser, go to [email or web address]. 2. Click on the First Time User? Click Here link in the Sign In box. You will see the New Member Sign Up page. 3. Enter your InstantQ Access Code exactly as it appears below. You will not need to use this code after youve completed the sign-up process. If you do not sign up before the expiration date, you must request a new code.     InstantQ Access Code: [email or web address]  Expires: 3/20/2018  1:13 PM (This is the date your CheckBonus access code will )    4. Enter the last four digits of your Social Security Number (xxxx) and Date of Birth (mm/dd/yyyy) as indicated and click Submit. You will be taken to the next sign-up page. 5. Create a [employer] ID. This will be your CheckBonus login ID and cannot be changed, so think of one that is secure and easy to remember. 6. Create a CheckBonus password. You can change your password at any time. 7. Enter your Password Reset Question and Answer. This can be used at a later time if you forget your password. 8. Enter your e-mail address. You will receive e-mail notification when new information is available in [address] 9. Click Sign Up. You can now view and download portions of your medical record. 10. Click the Download Summary menu link to download a portable copy of your medical information. Additional Information    If you have questions, please call [phone]. Remember, CheckBonus is NOT to be used for urgent needs. For medical emergencies, dial 911.
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
## 2018-07-11 ENCOUNTER — APPOINTMENT (OUTPATIENT)
Dept: CT IMAGING | Age: 63
DRG: 436 | End: 2018-07-11
Attending: EMERGENCY MEDICINE
Payer: MEDICARE

## 2018-07-11 ENCOUNTER — APPOINTMENT (OUTPATIENT)
Dept: GENERAL RADIOLOGY | Age: 63
DRG: 436 | End: 2018-07-11
Attending: EMERGENCY MEDICINE
Payer: MEDICARE

## 2018-07-11 ENCOUNTER — HOSPITAL ENCOUNTER (INPATIENT)
Age: 63
LOS: 2 days | Discharge: HOME | DRG: 436 | End: 2018-07-13
Attending: EMERGENCY MEDICINE | Admitting: HOSPITALIST
Payer: MEDICARE

## 2018-07-11 DIAGNOSIS — K86.89 PANCREATIC MASS: ICD-10-CM

## 2018-07-11 DIAGNOSIS — R10.13 ABDOMINAL PAIN, EPIGASTRIC: Primary | ICD-10-CM

## 2018-07-11 DIAGNOSIS — C25.9 PANCREATIC CANCER METASTASIZED TO LIVER (HCC): ICD-10-CM

## 2018-07-11 DIAGNOSIS — C78.7 PANCREATIC CANCER METASTASIZED TO LIVER (HCC): ICD-10-CM

## 2018-07-11 PROBLEM — E11.65 HYPERGLYCEMIA DUE TO TYPE 2 DIABETES MELLITUS (HCC): Status: ACTIVE | Noted: 2018-07-11

## 2018-07-11 PROBLEM — E87.6 HYPOKALEMIA: Status: ACTIVE | Noted: 2018-07-11

## 2018-07-11 PROBLEM — R63.4 WEIGHT LOSS, ABNORMAL: Status: ACTIVE | Noted: 2018-07-11

## 2018-07-11 LAB
ALBUMIN SERPL-MCNC: 2.5 G/DL (ref 3.5–5)
ALBUMIN/GLOB SERPL: 0.6 {RATIO} (ref 1.1–2.2)
ALP SERPL-CCNC: 215 U/L (ref 45–117)
ALT SERPL-CCNC: 19 U/L (ref 12–78)
AMPHET UR QL SCN: NEGATIVE
ANION GAP SERPL CALC-SCNC: 11 MMOL/L (ref 5–15)
APPEARANCE UR: CLEAR
APTT PPP: 34.1 SEC (ref 22.1–32)
AST SERPL-CCNC: 22 U/L (ref 15–37)
ATRIAL RATE: 101 BPM
BACTERIA URNS QL MICRO: NEGATIVE /HPF
BARBITURATES UR QL SCN: NEGATIVE
BASOPHILS # BLD: 0 K/UL (ref 0–0.1)
BASOPHILS NFR BLD: 1 % (ref 0–1)
BENZODIAZ UR QL: NEGATIVE
BILIRUB SERPL-MCNC: 0.4 MG/DL (ref 0.2–1)
BILIRUB UR QL CFM: NEGATIVE
BUN SERPL-MCNC: 12 MG/DL (ref 6–20)
BUN/CREAT SERPL: 12 (ref 12–20)
CALCIUM SERPL-MCNC: 8.5 MG/DL (ref 8.5–10.1)
CALCULATED P AXIS, ECG09: -2 DEGREES
CALCULATED R AXIS, ECG10: -34 DEGREES
CALCULATED T AXIS, ECG11: 68 DEGREES
CANNABINOIDS UR QL SCN: NEGATIVE
CHLORIDE SERPL-SCNC: 100 MMOL/L (ref 97–108)
CK MB CFR SERPL CALC: NORMAL % (ref 0–2.5)
CK MB SERPL-MCNC: <1 NG/ML (ref 5–25)
CK SERPL-CCNC: 39 U/L (ref 39–308)
CO2 SERPL-SCNC: 25 MMOL/L (ref 21–32)
COCAINE UR QL SCN: POSITIVE
COLOR UR: ABNORMAL
CREAT SERPL-MCNC: 1.04 MG/DL (ref 0.7–1.3)
DIAGNOSIS, 93000: NORMAL
DIFFERENTIAL METHOD BLD: ABNORMAL
DRUG SCRN COMMENT,DRGCM: ABNORMAL
EOSINOPHIL # BLD: 0.1 K/UL (ref 0–0.4)
EOSINOPHIL NFR BLD: 1 % (ref 0–7)
EPITH CASTS URNS QL MICRO: ABNORMAL /LPF
ERYTHROCYTE [DISTWIDTH] IN BLOOD BY AUTOMATED COUNT: 14.3 % (ref 11.5–14.5)
GLOBULIN SER CALC-MCNC: 4.5 G/DL (ref 2–4)
GLUCOSE BLD STRIP.AUTO-MCNC: 289 MG/DL (ref 65–100)
GLUCOSE BLD STRIP.AUTO-MCNC: 304 MG/DL (ref 65–100)
GLUCOSE BLD STRIP.AUTO-MCNC: 381 MG/DL (ref 65–100)
GLUCOSE SERPL-MCNC: 315 MG/DL (ref 65–100)
GLUCOSE UR STRIP.AUTO-MCNC: >1000 MG/DL
HCT VFR BLD AUTO: 38.9 % (ref 36.6–50.3)
HGB BLD-MCNC: 12.7 G/DL (ref 12.1–17)
HGB UR QL STRIP: NEGATIVE
HYALINE CASTS URNS QL MICRO: ABNORMAL /LPF (ref 0–5)
IMM GRANULOCYTES # BLD: 0 K/UL (ref 0–0.04)
IMM GRANULOCYTES NFR BLD AUTO: 0 % (ref 0–0.5)
INR PPP: 1.3 (ref 0.9–1.1)
KETONES UR QL STRIP.AUTO: ABNORMAL MG/DL
LACTATE SERPL-SCNC: 1.1 MMOL/L (ref 0.4–2)
LEUKOCYTE ESTERASE UR QL STRIP.AUTO: NEGATIVE
LIPASE SERPL-CCNC: 279 U/L (ref 73–393)
LYMPHOCYTES # BLD: 1.2 K/UL (ref 0.8–3.5)
LYMPHOCYTES NFR BLD: 31 % (ref 12–49)
MAGNESIUM SERPL-MCNC: 1.8 MG/DL (ref 1.6–2.4)
MCH RBC QN AUTO: 22.9 PG (ref 26–34)
MCHC RBC AUTO-ENTMCNC: 32.6 G/DL (ref 30–36.5)
MCV RBC AUTO: 70.1 FL (ref 80–99)
METHADONE UR QL: NEGATIVE
MONOCYTES # BLD: 0.4 K/UL (ref 0–1)
MONOCYTES NFR BLD: 9 % (ref 5–13)
NEUTS SEG # BLD: 2.3 K/UL (ref 1.8–8)
NEUTS SEG NFR BLD: 58 % (ref 32–75)
NITRITE UR QL STRIP.AUTO: NEGATIVE
NRBC # BLD: 0 K/UL (ref 0–0.01)
NRBC BLD-RTO: 0 PER 100 WBC
OPIATES UR QL: NEGATIVE
P-R INTERVAL, ECG05: 154 MS
PCP UR QL: NEGATIVE
PH UR STRIP: 6 [PH] (ref 5–8)
PLATELET # BLD AUTO: 188 K/UL (ref 150–400)
POTASSIUM SERPL-SCNC: 3.3 MMOL/L (ref 3.5–5.1)
PROT SERPL-MCNC: 7 G/DL (ref 6.4–8.2)
PROT UR STRIP-MCNC: 30 MG/DL
PROTHROMBIN TIME: 13.2 SEC (ref 9–11.1)
Q-T INTERVAL, ECG07: 370 MS
QRS DURATION, ECG06: 92 MS
QTC CALCULATION (BEZET), ECG08: 479 MS
RBC # BLD AUTO: 5.55 M/UL (ref 4.1–5.7)
RBC #/AREA URNS HPF: ABNORMAL /HPF (ref 0–5)
SERVICE CMNT-IMP: ABNORMAL
SODIUM SERPL-SCNC: 136 MMOL/L (ref 136–145)
SP GR UR REFRACTOMETRY: 1.02 (ref 1–1.03)
THERAPEUTIC RANGE,PTTT: ABNORMAL SECS (ref 58–77)
TROPONIN I SERPL-MCNC: <0.05 NG/ML
UA: UC IF INDICATED,UAUC: ABNORMAL
UROBILINOGEN UR QL STRIP.AUTO: 2 EU/DL (ref 0.2–1)
VENTRICULAR RATE, ECG03: 101 BPM
WBC # BLD AUTO: 3.9 K/UL (ref 4.1–11.1)
WBC URNS QL MICRO: ABNORMAL /HPF (ref 0–4)

## 2018-07-11 PROCEDURE — 80053 COMPREHEN METABOLIC PANEL: CPT | Performed by: EMERGENCY MEDICINE

## 2018-07-11 PROCEDURE — 93005 ELECTROCARDIOGRAM TRACING: CPT

## 2018-07-11 PROCEDURE — 82962 GLUCOSE BLOOD TEST: CPT

## 2018-07-11 PROCEDURE — 81001 URINALYSIS AUTO W/SCOPE: CPT | Performed by: EMERGENCY MEDICINE

## 2018-07-11 PROCEDURE — 85025 COMPLETE CBC W/AUTO DIFF WBC: CPT | Performed by: EMERGENCY MEDICINE

## 2018-07-11 PROCEDURE — 80307 DRUG TEST PRSMV CHEM ANLYZR: CPT | Performed by: EMERGENCY MEDICINE

## 2018-07-11 PROCEDURE — 84484 ASSAY OF TROPONIN QUANT: CPT | Performed by: EMERGENCY MEDICINE

## 2018-07-11 PROCEDURE — 96374 THER/PROPH/DIAG INJ IV PUSH: CPT

## 2018-07-11 PROCEDURE — 36415 COLL VENOUS BLD VENIPUNCTURE: CPT | Performed by: EMERGENCY MEDICINE

## 2018-07-11 PROCEDURE — 85610 PROTHROMBIN TIME: CPT | Performed by: EMERGENCY MEDICINE

## 2018-07-11 PROCEDURE — 74177 CT ABD & PELVIS W/CONTRAST: CPT

## 2018-07-11 PROCEDURE — 65660000000 HC RM CCU STEPDOWN

## 2018-07-11 PROCEDURE — 74011000258 HC RX REV CODE- 258: Performed by: EMERGENCY MEDICINE

## 2018-07-11 PROCEDURE — 96375 TX/PRO/DX INJ NEW DRUG ADDON: CPT

## 2018-07-11 PROCEDURE — 74011636320 HC RX REV CODE- 636/320: Performed by: EMERGENCY MEDICINE

## 2018-07-11 PROCEDURE — 82550 ASSAY OF CK (CPK): CPT | Performed by: EMERGENCY MEDICINE

## 2018-07-11 PROCEDURE — 74022 RADEX COMPL AQT ABD SERIES: CPT

## 2018-07-11 PROCEDURE — 85730 THROMBOPLASTIN TIME PARTIAL: CPT | Performed by: EMERGENCY MEDICINE

## 2018-07-11 PROCEDURE — 99284 EMERGENCY DEPT VISIT MOD MDM: CPT

## 2018-07-11 PROCEDURE — 83690 ASSAY OF LIPASE: CPT | Performed by: EMERGENCY MEDICINE

## 2018-07-11 PROCEDURE — 74011250636 HC RX REV CODE- 250/636: Performed by: EMERGENCY MEDICINE

## 2018-07-11 PROCEDURE — 83735 ASSAY OF MAGNESIUM: CPT | Performed by: EMERGENCY MEDICINE

## 2018-07-11 PROCEDURE — 83605 ASSAY OF LACTIC ACID: CPT | Performed by: EMERGENCY MEDICINE

## 2018-07-11 PROCEDURE — 74011250636 HC RX REV CODE- 250/636: Performed by: HOSPITALIST

## 2018-07-11 PROCEDURE — 94761 N-INVAS EAR/PLS OXIMETRY MLT: CPT

## 2018-07-11 PROCEDURE — 74011250637 HC RX REV CODE- 250/637: Performed by: HOSPITALIST

## 2018-07-11 RX ORDER — OXYBUTYNIN CHLORIDE 5 MG/1
5 TABLET ORAL DAILY
Status: DISCONTINUED | OUTPATIENT
Start: 2018-07-12 | End: 2018-07-13 | Stop reason: HOSPADM

## 2018-07-11 RX ORDER — HYDROMORPHONE HYDROCHLORIDE 2 MG/ML
0.2 INJECTION, SOLUTION INTRAMUSCULAR; INTRAVENOUS; SUBCUTANEOUS
Status: DISCONTINUED | OUTPATIENT
Start: 2018-07-11 | End: 2018-07-13 | Stop reason: HOSPADM

## 2018-07-11 RX ORDER — ONDANSETRON 2 MG/ML
4 INJECTION INTRAMUSCULAR; INTRAVENOUS
Status: DISCONTINUED | OUTPATIENT
Start: 2018-07-11 | End: 2018-07-13 | Stop reason: HOSPADM

## 2018-07-11 RX ORDER — ALPRAZOLAM 0.5 MG/1
1 TABLET ORAL
Status: DISCONTINUED | OUTPATIENT
Start: 2018-07-11 | End: 2018-07-13 | Stop reason: HOSPADM

## 2018-07-11 RX ORDER — ACETAMINOPHEN 325 MG/1
650 TABLET ORAL
Status: DISCONTINUED | OUTPATIENT
Start: 2018-07-11 | End: 2018-07-13 | Stop reason: HOSPADM

## 2018-07-11 RX ORDER — SODIUM CHLORIDE 0.9 % (FLUSH) 0.9 %
5-10 SYRINGE (ML) INJECTION AS NEEDED
Status: DISCONTINUED | OUTPATIENT
Start: 2018-07-11 | End: 2018-07-13 | Stop reason: HOSPADM

## 2018-07-11 RX ORDER — POTASSIUM CHLORIDE 750 MG/1
40 TABLET, FILM COATED, EXTENDED RELEASE ORAL
Status: COMPLETED | OUTPATIENT
Start: 2018-07-11 | End: 2018-07-11

## 2018-07-11 RX ORDER — NALOXONE HYDROCHLORIDE 0.4 MG/ML
0.4 INJECTION, SOLUTION INTRAMUSCULAR; INTRAVENOUS; SUBCUTANEOUS AS NEEDED
Status: DISCONTINUED | OUTPATIENT
Start: 2018-07-11 | End: 2018-07-13 | Stop reason: HOSPADM

## 2018-07-11 RX ORDER — CITALOPRAM 20 MG/1
40 TABLET, FILM COATED ORAL DAILY
Status: DISCONTINUED | OUTPATIENT
Start: 2018-07-12 | End: 2018-07-13 | Stop reason: HOSPADM

## 2018-07-11 RX ORDER — BISMUTH SUBSALICYLATE 262 MG
1 TABLET,CHEWABLE ORAL DAILY
COMMUNITY

## 2018-07-11 RX ORDER — PANTOPRAZOLE SODIUM 40 MG/1
40 TABLET, DELAYED RELEASE ORAL DAILY
COMMUNITY

## 2018-07-11 RX ORDER — ALPRAZOLAM 1 MG/1
1 TABLET ORAL 4 TIMES DAILY
COMMUNITY

## 2018-07-11 RX ORDER — DIVALPROEX SODIUM 250 MG/1
500 TABLET, DELAYED RELEASE ORAL 2 TIMES DAILY
Status: DISCONTINUED | OUTPATIENT
Start: 2018-07-11 | End: 2018-07-13 | Stop reason: HOSPADM

## 2018-07-11 RX ORDER — TAMSULOSIN HYDROCHLORIDE 0.4 MG/1
0.4 CAPSULE ORAL DAILY
Status: DISCONTINUED | OUTPATIENT
Start: 2018-07-11 | End: 2018-07-13 | Stop reason: HOSPADM

## 2018-07-11 RX ORDER — CITALOPRAM 20 MG/1
10 TABLET, FILM COATED ORAL DAILY
Status: DISCONTINUED | OUTPATIENT
Start: 2018-07-11 | End: 2018-07-11

## 2018-07-11 RX ORDER — MEGESTROL ACETATE 40 MG/ML
200 SUSPENSION ORAL DAILY
Status: DISCONTINUED | OUTPATIENT
Start: 2018-07-11 | End: 2018-07-13 | Stop reason: HOSPADM

## 2018-07-11 RX ORDER — OXYBUTYNIN CHLORIDE 5 MG/1
5 TABLET ORAL
COMMUNITY

## 2018-07-11 RX ORDER — THERA TABS 400 MCG
1 TAB ORAL DAILY
Status: DISCONTINUED | OUTPATIENT
Start: 2018-07-11 | End: 2018-07-13 | Stop reason: HOSPADM

## 2018-07-11 RX ORDER — OXYCODONE AND ACETAMINOPHEN 5; 325 MG/1; MG/1
1 TABLET ORAL
Status: ON HOLD | COMMUNITY
End: 2018-07-12

## 2018-07-11 RX ORDER — SODIUM CHLORIDE 0.9 % (FLUSH) 0.9 %
5-10 SYRINGE (ML) INJECTION EVERY 8 HOURS
Status: DISCONTINUED | OUTPATIENT
Start: 2018-07-11 | End: 2018-07-13 | Stop reason: HOSPADM

## 2018-07-11 RX ORDER — NITROGLYCERIN 0.4 MG/1
0.4 TABLET SUBLINGUAL
COMMUNITY

## 2018-07-11 RX ORDER — SODIUM CHLORIDE 9 MG/ML
75 INJECTION, SOLUTION INTRAVENOUS CONTINUOUS
Status: DISCONTINUED | OUTPATIENT
Start: 2018-07-11 | End: 2018-07-12

## 2018-07-11 RX ORDER — HYDROCODONE BITARTRATE AND ACETAMINOPHEN 5; 325 MG/1; MG/1
TABLET ORAL
Status: DISPENSED
Start: 2018-07-11 | End: 2018-07-11

## 2018-07-11 RX ORDER — DOCUSATE SODIUM 100 MG/1
100 CAPSULE, LIQUID FILLED ORAL 2 TIMES DAILY
Status: DISCONTINUED | OUTPATIENT
Start: 2018-07-11 | End: 2018-07-13 | Stop reason: HOSPADM

## 2018-07-11 RX ORDER — KETOROLAC TROMETHAMINE 30 MG/ML
15 INJECTION, SOLUTION INTRAMUSCULAR; INTRAVENOUS
Status: DISCONTINUED | OUTPATIENT
Start: 2018-07-11 | End: 2018-07-11

## 2018-07-11 RX ORDER — INSULIN LISPRO 100 [IU]/ML
INJECTION, SOLUTION INTRAVENOUS; SUBCUTANEOUS
Status: DISCONTINUED | OUTPATIENT
Start: 2018-07-11 | End: 2018-07-13 | Stop reason: HOSPADM

## 2018-07-11 RX ORDER — LISINOPRIL 20 MG/1
20 TABLET ORAL 2 TIMES DAILY
COMMUNITY

## 2018-07-11 RX ORDER — LISINOPRIL 20 MG/1
20 TABLET ORAL DAILY
Status: DISCONTINUED | OUTPATIENT
Start: 2018-07-11 | End: 2018-07-13 | Stop reason: HOSPADM

## 2018-07-11 RX ORDER — DIPHENHYDRAMINE HYDROCHLORIDE 50 MG/ML
12.5 INJECTION, SOLUTION INTRAMUSCULAR; INTRAVENOUS
Status: COMPLETED | OUTPATIENT
Start: 2018-07-11 | End: 2018-07-11

## 2018-07-11 RX ORDER — ALPRAZOLAM 0.5 MG/1
1 TABLET ORAL 4 TIMES DAILY
Status: DISCONTINUED | OUTPATIENT
Start: 2018-07-11 | End: 2018-07-11

## 2018-07-11 RX ORDER — FLUTICASONE PROPIONATE 50 MCG
2 SPRAY, SUSPENSION (ML) NASAL
COMMUNITY

## 2018-07-11 RX ORDER — MAGNESIUM SULFATE 100 %
4 CRYSTALS MISCELLANEOUS AS NEEDED
Status: DISCONTINUED | OUTPATIENT
Start: 2018-07-11 | End: 2018-07-13 | Stop reason: HOSPADM

## 2018-07-11 RX ORDER — OXYCODONE HYDROCHLORIDE 5 MG/1
5-10 TABLET ORAL
Status: DISCONTINUED | OUTPATIENT
Start: 2018-07-11 | End: 2018-07-12

## 2018-07-11 RX ORDER — SODIUM CHLORIDE 0.9 % (FLUSH) 0.9 %
10 SYRINGE (ML) INJECTION
Status: COMPLETED | OUTPATIENT
Start: 2018-07-11 | End: 2018-07-11

## 2018-07-11 RX ORDER — PROCHLORPERAZINE EDISYLATE 5 MG/ML
5 INJECTION INTRAMUSCULAR; INTRAVENOUS
Status: COMPLETED | OUTPATIENT
Start: 2018-07-11 | End: 2018-07-11

## 2018-07-11 RX ORDER — PANTOPRAZOLE SODIUM 40 MG/1
40 TABLET, DELAYED RELEASE ORAL
Status: DISCONTINUED | OUTPATIENT
Start: 2018-07-11 | End: 2018-07-13 | Stop reason: HOSPADM

## 2018-07-11 RX ORDER — TAMSULOSIN HYDROCHLORIDE 0.4 MG/1
0.4 CAPSULE ORAL DAILY
COMMUNITY

## 2018-07-11 RX ORDER — DEXTROSE 50 % IN WATER (D50W) INTRAVENOUS SYRINGE
12.5-25 AS NEEDED
Status: DISCONTINUED | OUTPATIENT
Start: 2018-07-11 | End: 2018-07-13 | Stop reason: HOSPADM

## 2018-07-11 RX ORDER — CITALOPRAM 40 MG/1
40 TABLET, FILM COATED ORAL DAILY
COMMUNITY

## 2018-07-11 RX ORDER — FENTANYL CITRATE 50 UG/ML
50 INJECTION, SOLUTION INTRAMUSCULAR; INTRAVENOUS
Status: COMPLETED | OUTPATIENT
Start: 2018-07-11 | End: 2018-07-11

## 2018-07-11 RX ADMIN — IOPAMIDOL 100 ML: 755 INJECTION, SOLUTION INTRAVENOUS at 05:51

## 2018-07-11 RX ADMIN — OXYCODONE HYDROCHLORIDE 5 MG: 5 TABLET ORAL at 21:24

## 2018-07-11 RX ADMIN — ALPRAZOLAM 1 MG: 0.5 TABLET ORAL at 13:31

## 2018-07-11 RX ADMIN — OXYCODONE HYDROCHLORIDE 5 MG: 5 TABLET ORAL at 10:09

## 2018-07-11 RX ADMIN — DIPHENHYDRAMINE HYDROCHLORIDE 12.5 MG: 50 INJECTION, SOLUTION INTRAMUSCULAR; INTRAVENOUS at 04:52

## 2018-07-11 RX ADMIN — PROCHLORPERAZINE EDISYLATE 5 MG: 5 INJECTION INTRAMUSCULAR; INTRAVENOUS at 04:52

## 2018-07-11 RX ADMIN — DIVALPROEX SODIUM 500 MG: 250 TABLET, DELAYED RELEASE ORAL at 18:30

## 2018-07-11 RX ADMIN — LISINOPRIL 20 MG: 20 TABLET ORAL at 10:02

## 2018-07-11 RX ADMIN — Medication 10 ML: at 21:25

## 2018-07-11 RX ADMIN — ALPRAZOLAM 1 MG: 0.5 TABLET ORAL at 10:01

## 2018-07-11 RX ADMIN — SODIUM CHLORIDE 50 ML: 900 INJECTION, SOLUTION INTRAVENOUS at 05:51

## 2018-07-11 RX ADMIN — DIVALPROEX SODIUM 500 MG: 250 TABLET, DELAYED RELEASE ORAL at 10:01

## 2018-07-11 RX ADMIN — Medication 10 ML: at 18:31

## 2018-07-11 RX ADMIN — DOCUSATE SODIUM 100 MG: 100 CAPSULE, LIQUID FILLED ORAL at 18:30

## 2018-07-11 RX ADMIN — PANTOPRAZOLE SODIUM 40 MG: 40 TABLET, DELAYED RELEASE ORAL at 18:38

## 2018-07-11 RX ADMIN — CITALOPRAM HYDROBROMIDE 10 MG: 20 TABLET, FILM COATED ORAL at 11:41

## 2018-07-11 RX ADMIN — FENTANYL CITRATE 50 MCG: 50 INJECTION, SOLUTION INTRAMUSCULAR; INTRAVENOUS at 06:31

## 2018-07-11 RX ADMIN — Medication 10 ML: at 05:52

## 2018-07-11 RX ADMIN — SODIUM CHLORIDE 75 ML/HR: 900 INJECTION, SOLUTION INTRAVENOUS at 18:31

## 2018-07-11 RX ADMIN — Medication 10 ML: at 06:32

## 2018-07-11 RX ADMIN — DOCUSATE SODIUM 100 MG: 100 CAPSULE, LIQUID FILLED ORAL at 10:02

## 2018-07-11 RX ADMIN — MEGESTROL ACETATE 200 MG: 40 SUSPENSION ORAL at 11:41

## 2018-07-11 RX ADMIN — ONDANSETRON 4 MG: 2 INJECTION INTRAMUSCULAR; INTRAVENOUS at 21:24

## 2018-07-11 RX ADMIN — THERA TABS 1 TABLET: TAB at 11:40

## 2018-07-11 RX ADMIN — POTASSIUM CHLORIDE 40 MEQ: 750 TABLET, EXTENDED RELEASE ORAL at 10:01

## 2018-07-11 RX ADMIN — ONDANSETRON 4 MG: 2 INJECTION INTRAMUSCULAR; INTRAVENOUS at 10:09

## 2018-07-11 RX ADMIN — TAMSULOSIN HYDROCHLORIDE 0.4 MG: 0.4 CAPSULE ORAL at 18:38

## 2018-07-11 NOTE — IP AVS SNAPSHOT
[address] [facility] 
[phone] Patient: Ida Harding 
MRN: [id number] About your hospitalization You were admitted on:  July 11, 2018 You last received care in the:  [facility] You were discharged on:  July 13, 2018 Why you were hospitalized Your primary diagnosis was:  Pancreatic Mass Your diagnoses also included:  Hypokalemia, Weight Loss, Abnormal, Hyperglycemia Due To Type 2 Diabetes Mellitus (Hcc) Follow-up Information Follow up With Details Comments Contact Info Irma Oliva MD In 5 days  [address] [facility] [address] 
[phone] Your Scheduled Appointments Monday July 16, 2018 10:00 AM EDT  
US THYRD/PARATHY/SOFT TISS with [id number] [place] [address]  
[phone] GENERAL INSTRUCTIONS  1. Bring outside films ([employer]) pertaining to the affected area with you on the day of appointment. 2. A written order with a valid diagnosis and Physicians signature is required for all scheduled tests. 3. Check in at registration 30 minutes before your appointment time unless you were instructed to do otherwise. When going to [facility], please arrive 30 minutes prior to your appointment time. Use the main entrance on [address] [place] [address]. Check in at the information desk. If your appointment is after 4:00pm, please go to the Emergency Room to be registered. Discharge Orders None A check bettie indicates which time of day the medication should be taken. My Medications START taking these medications Instructions Each Dose to Equal  
 Morning Noon Evening Bedtime  
 docusate sodium 100 mg capsule Commonly known as:  Sue Catherine Your last dose was: Your next dose is: Take 1 Cap by mouth two (2) times a day for 90 days. 100 mg  
    
   
   
   
  
 glipiZIDE 10 mg tablet Commonly known as:  Danitza Rubalcava Your last dose was: Your next dose is: Take 1 Tab by mouth Before breakfast and dinner. 10 mg  
    
   
   
   
  
 megestrol 400 mg/10 mL (10 mL) suspension Commonly known as:  MEGACE Your last dose was: Your next dose is: Take 5 mL by mouth daily. 200 mg  
    
   
   
   
  
 therapeutic multivitamin tablet Commonly known as:  [facility] Your last dose was: Your next dose is: Take 1 Tab by mouth daily. 1 Tab CHANGE how you take these medications Instructions Each Dose to Equal  
 Morning Noon Evening Bedtime CeleXA 40 mg tablet Generic drug:  citalopram  
What changed:  Another medication with the same name was removed. Continue taking this medication, and follow the directions you see here. Your last dose was: Your next dose is: Take 40 mg by mouth daily. 40 mg  
    
   
   
   
  
 lisinopril 20 mg tablet Commonly known as:  Angely Rascon What changed:  Another medication with the same name was removed. Continue taking this medication, and follow the directions you see here. Your last dose was: Your next dose is: Take 20 mg by mouth two (2) times a day. 20 mg CONTINUE taking these medications Instructions Each Dose to Equal  
 Morning Noon Evening Bedtime  
 albuterol 90 mcg/actuation inhaler Commonly known as:  PROVENTIL HFA, VENTOLIN HFA, PROAIR HFA Your last dose was: Your next dose is: Take 2 Puffs by inhalation every four (4) hours as needed for Wheezing. 2 Puff  
    
   
   
   
  
 chlorpheniramine-HYDROcodone 10-8 mg/5 mL suspension Commonly known as:  Alok BOYD  
   
 Your last dose was: Your next dose is: Take 5 mL by mouth every twelve (12) hours as needed for Cough. Max Daily Amount: 10 mL. 5 mL  
    
   
   
   
  
 divalproex  mg tablet Commonly known as:  DEPAKOTE Your last dose was: Your next dose is: Take 1 tablet by mouth two (2) times a day. 500 mg FLOMAX 0.4 mg capsule Generic drug:  tamsulosin Your last dose was: Your next dose is: Take 0.4 mg by mouth daily. 0.4 mg  
    
   
   
   
  
 FLONASE ALLERGY RELIEF 50 mcg/actuation nasal spray Generic drug:  fluticasone Your last dose was: Your next dose is: 2 Sprays by Both Nostrils route daily as needed for Rhinitis. 2 Spray  
    
   
   
   
  
 metFORMIN 500 mg tablet Commonly known as:  GLUCOPHAGE Your last dose was: Your next dose is: Take 500 mg by mouth two (2) times daily (with meals). 500 mg  
    
   
   
   
  
 multivitamin tablet Commonly known as:  ONE A DAY Your last dose was: Your next dose is: Take 1 Tab by mouth daily. 1 Tab NITROSTAT 0.4 mg SL tablet Generic drug:  nitroglycerin Your last dose was: Your next dose is: 0.4 mg by SubLINGual route every five (5) minutes as needed for Chest Pain. Up to 3 doses. 0.4 mg  
    
   
   
   
  
 oxybutynin 5 mg tablet Commonly known as:  MKWJEZPG Your last dose was: Your next dose is: Take 5 mg by mouth nightly. 5 mg  
    
   
   
   
  
 oxyCODONE-acetaminophen 5-325 mg per tablet Commonly known as:  PERCOCET Your last dose was: Your next dose is: Take 1 Tab by mouth every four (4) hours as needed for Pain. Max Daily Amount: 6 Tabs. 1 Tab PROTONIX 40 mg tablet Generic drug:  pantoprazole Your last dose was: Your next dose is: Take 40 mg by mouth daily. 40 mg  
    
   
   
   
  
 VIAGRA 100 mg tablet Generic drug:  sildenafil citrate Your last dose was: Your next dose is: Take 100 mg by mouth as needed (\"To set the mood\"). 100 mg  
    
   
   
   
  
 XANAX 1 mg tablet Generic drug:  ALPRAZolam  
   
Your last dose was: Your next dose is: Take 1 mg by mouth four (4) times daily. 1 mg XARELTO 20 mg Tab tablet Generic drug:  rivaroxaban Your last dose was: Your next dose is: Take 20 mg by mouth daily. 20 mg Where to Get Your Medications Information on where to get these meds will be given to you by the nurse or doctor. ! Ask your nurse or doctor about these medications  
  docusate sodium 100 mg capsule  
 glipiZIDE 10 mg tablet  
 megestrol 400 mg/10 mL (10 mL) suspension  
 oxyCODONE-acetaminophen 5-325 mg per tablet  
 therapeutic multivitamin tablet Opioid Education Prescription Opioids: What You Need to Know: 
 
 
NAME: Eli Allan II  
:  1955 MRN:  [id number] Date/Time:  2018 4:41 PM 
 
ADMIT DATE: 2018 DISCHARGE DATE: 2018 · It is important that you take the medication exactly as they are prescribed. · Keep your medication in the bottles provided by the pharmacist and keep a list of the medication names, dosages, and times to be taken in your wallet. · Do not take other medications without consulting your doctor. What to do at [facility] Recommended diet:  Diabetic Diet consistent carbohydrate diet Recommended activity: Activity as tolerated If you have questions regarding the hospital related prescriptions or hospital related issues please call [facility] at . You can always direct your questions to your primary care doctor if you are unable to reach your hospital physician; your PCP works as an extension of your hospital doctor just like your hospital doctor is an extension of your PCP for your time at the hospital [facility]) If you experience any of the following symptoms then please call your primary care physician or return to the emergency room if you cannot get hold of your doctor: 
 
Fever, chills, nausea, vomiting, or persistent diarrhea Worsening weakness or new problems with your speech or balance Dark stools or visible blood in your stools New Leg swelling or shortness of breath as this could be signs of a clot Additional Instructions: 
 
 
Bring these papers with you to your follow up appointments. The papers will help your doctors be sure to continue the care plan from the hospital. 
 
 
INSTRUCTIONS: 
Hold Xarelto until 7/16/18 Hold Metfomin until 7/16/18 Information obtained by : 
I understand that if any problems occur once I am at home I am to contact my physician. I understand and acknowledge receipt of the instructions indicated above. Physician's or R.N.'s Signature                                                                  Date/Time Patient or Representative Signature Learning About ACE Inhibitors and ARBs for Diabetes Introduction ACE inhibitors and ARBs are medicines used to control blood pressure. They allow blood vessels to relax and open up. This lowers your blood pressure. When you have diabetes, taking an ACE inhibitor or ARB can help to: · Treat high blood pressure. Your risk of problems from diabetes goes up when you have high blood pressure. · Prevent or slow kidney damage. Diabetes can damage the blood vessels in the kidneys. High blood pressure can damage the kidneys, too. · Lower the risks of stroke and heart attack. Your risks go up when you have high blood pressure, heart disease, or both. An ACE inhibitor or ARB is a good choice for people with diabetes. Unlike some medicines, these don't affect blood sugar levels. Examples ACE inhibitors include: · Benazepril. · Lisinopril. · Ramipril. ARBs include: · Irbesartan. · Losartan. · Telmisartan. Possible side effects All medicines can cause side effects. Some side effects of ACE inhibitors include: 
· Low blood pressure. You may feel dizzy and weak. · A cough. · High potassium levels. · An allergic reaction of the skin. Symptoms may range from mild swelling to painful welts. Some side effects of ARBs include: · Diarrhea. · High potassium levels. · Sinus problems. · Stomach problems. You may have other side effects or reactions not listed here. Check the information that comes with your medicine. What to know about taking this medicine · Be safe with medicines. Take your medicines exactly as prescribed. Call your doctor if you think you are having a problem with your medicine. · Before starting an ACE inhibitor or ARB, tell your doctor if you: ¨ Use a salt substitute. ¨ Take diuretics or potassium tablets. · These medicines are not safe for pregnancy. If you are pregnant or planning to be, talk to your doctor about a safe blood pressure medicine. · ACE inhibitors can cause a dry cough.  If the cough is bad, talk to your doctor. Switching to an ARB is likely to help. · Taking some medicines together can cause problems. Tell your doctor or pharmacist all the medicines you take. This includes over-the-counter medicines, vitamins, herbal products, and supplements. · You may need regular blood and urine tests. Where can you learn more? Go to http://dean-sergio.info/. Enter M316 in the search box to learn more about \"Learning About ACE Inhibitors and ARBs for Diabetes. \" Current as of: December 7, 2017 Content Version: 11.7 © 3876-0385 [employer]. Care instructions adapted under license by [employer] (which disclaims liability or warranty for this information). If you have questions about a medical condition or this instruction, always ask your healthcare professional. [address] any warranty or liability for your use of this information. [employer] Announcement We are excited to announce that we are making your provider's discharge notes available to you in [employer]. You will see these notes when they are completed and signed by the physician that discharged you from your recent hospital stay. If you have any questions or concerns about any information you see in [employer], please call the Health Information Department where you were seen or reach out to your Primary Care Provider for more information about your plan of care. Introducing [facility]! [employer] introduces [employer] patient portal. Now you can access parts of your medical record, email your doctor's office, and request medication refills online. 1. In your internet browser, go to [email or web address] 2. Click on the First Time User? Click Here link in the Sign In box. You will see the New Member Sign Up page. 3. Enter your [employer] Access Code exactly as it appears below.  You will not need to use this code after youve completed the sign-up process. If you do not sign up before the expiration date, you must request a new code. · [employer] Access Code: [email or web address] Expires: 10/9/2018  3:13 AM 
 
4. Enter the last four digits of your Social Security Number (xxxx) and Date of Birth (mm/dd/yyyy) as indicated and click Submit. You will be taken to the next sign-up page. 5. Create a [employer] ID. This will be your [employer] login ID and cannot be changed, so think of one that is secure and easy to remember. 6. Create a [employer] password. You can change your password at any time. 7. Enter your Password Reset Question and Answer. This can be used at a later time if you forget your password. 8. Enter your e-mail address. You will receive e-mail notification when new information is available in [address] 9. Click Sign Up. You can now view and download portions of your medical record. 10. Click the Download Summary menu link to download a portable copy of your medical information. If you have questions, please visit the Frequently Asked Questions section of the [employer] website. Remember, [employer] is NOT to be used for urgent needs. For medical emergencies, dial 911. Now available from your iPhone and Android! Introducing Girish Mirza As a [employer] patient, I wanted to make you aware of our electronic visit tool called Girish Mirza. [employer] 24/7 allows you to connect within minutes with a medical provider 24 hours a day, seven days a week via a mobile device or tablet or logging into a secure website from your computer. You can access Girish Mirza from anywhere in the [place].  
 
A virtual visit might be right for you when you have a simple condition and feel like you just dont want to get out of bed, or cant get away from work for an appointment, when your regular [employer] provider is not available (evenings, weekends or holidays), or when youre out of town and need minor care. Electronic visits cost only $49 and if the [facility] 24[facility]7 provider determines a prescription is needed to treat your condition, one can be electronically transmitted to a nearby pharmacy*. Please take a moment to enroll today if you have not already done so. The enrollment process is free and takes just a few minutes. To enroll, please download the [employer] harry to your tablet or phone, or visit [email or web address] to enroll on your computer. And, as an [address] [place] [address] patient with a [employer] account, the results of your visits will be scanned into your electronic medical record and your primary care provider will be able to view the scanned results. We urge you to continue to see your regular [facility] provider for your ongoing medical care. And while your primary care provider may not be the one available when you seek a [employer] virtual visit, the peace of mind you get from getting a real diagnosis real time can be priceless. For more information on [employer], view our Frequently Asked Questions (FAQs) at [email or web address]. Sincerely, 
 
Shahram Orr MD 
Chief Medical Officer [facility] Susan Chery *:  certain medications cannot be prescribed via [employer] Providers Seen During Your Hospitalization Provider Specialty Primary office phone April Babita Gonzalez MD Emergency Medicine [phone] Natalie Gallardo MD Internal Medicine [phone] Cherelle Page MD Internal Medicine [phone] Your Primary Care Physician (PCP) Primary Care Physician Office Phone Office Fax  
 Fleet Backer [phone] You are allergic to the following Allergen Reactions Pcn (Penicillins) Anaphylaxis Hives Itching Recent Documentation Height Weight BMI Smoking Status 1.702 m 104.5 kg 36.08 kg/m2 Former Smoker Emergency Contacts Name Discharge Info Relation Home Work Mobile Froy Barr N/A  AT THIS TIME [6] Child [2] [phone] [address] [facility] CAREGIVER [3] Child [2] [phone] Patient Belongings The following personal items are in your possession at time of discharge: 
  Dental Appliances: None  Visual Aid: Glasses Please provide this summary of care documentation to your next provider. Signatures-by signing, you are acknowledging that this After Visit Summary has been reviewed with you and you have received a copy. Patient Signature:  ____________________________________________________________ Date:  ____________________________________________________________  
  
[facility] Provider Signature:  ____________________________________________________________ Date:  ____________________________________________________________

## 2018-07-11 NOTE — CONSULTS
GASTROENTEROLOGY CONSULTATION NOTE                            JOSE Wang MD  [facility]  [phone]  [email or web address]        NAME:  Anuja Santos   :   1955   MRN:   [id number]   PCP: Kiesha Schuler MD  Date/Time:  2018 11:57 AM    Referring Physician: Hilario Godinez MD    Consult Date: 2018 11:57 AM    Reason for consult: pancreatic mass                   Assessment:   1. Pancreatic mass with liver lesions suggestive of mets  2. Abdominal pain--likely due to the carcinoma  3. Profound weight loss           Plan:   Needs EUS of pancreas with biopsy  Holding anticoagulation         History of Present Illness:  Patient is a 63 y.o. who is seen in consultation at the request of Dr. Pilar Villegas for pancreatic mass    Alline Gosselin II is a 63 y.o.  male who complains of  several months of constant pain in upper abdomen, described as a burning severe pain. It intensified last night so he came to ED today . He has lost almost 120 pounds since January. Pain does worsen with eating so he has limited intake. No nausea or vomiting. CT scan[de-identified]  LIVER: There are multiple hypodense masses within the liver. GALLBLADDER: Unremarkable. SPLEEN: No mass. PANCREAS: 5 cm mass in the pancreatic body with distal pancreatic duct  dilatation. ADRENALS: Unremarkable. KIDNEYS: No mass, calculus, or hydronephrosis. STOMACH: Unremarkable. SMALL BOWEL: No dilatation or wall thickening. COLON: No dilatation or wall thickening. APPENDIX: Unremarkable. PERITONEUM: No ascites or pneumoperitoneum. RETROPERITONEUM: No lymphadenopathy or aortic aneurysm. REPRODUCTIVE ORGANS:  URINARY BLADDER: No mass or calculus. BONES: No destructive bone lesion. ADDITIONAL COMMENTS: N/A     IMPRESSION  IMPRESSION:  Pancreatic body mass with assoc.  Numerous hepatic masses compatible with  pancreatic carcinoma and metastatic disease.             PMH:  Past Medical History:   Diagnosis Date    Acquired lactose intolerance 12/1/2010    Atrial fibrillation ([facility] 12/1/2010    Atrial septal aneurysm 3/12/2011    Depression     Diabetes ([facility] 10/26/01    LOPEZ (dyspnea on exertion) 10/26/01    Essential hypertension     GERD (gastroesophageal reflux disease) 10/26/01    Headache(784.0) 10/26/01    Hypertension     IHSS (idiopathic hypertrophic subaortic stenosis) ([facility] 12/1/2010    Morbid obesity ([facility] 10/26/01    Other ill-defined conditions(799.89)     sleep apnea    Other ill-defined conditions(799.89)     migraines    Pneumonia     Psychiatric disorder     Depression since 2005    Secondary seizure disorder ([facility] 12/1/2010    Seizures ([facility] [facility]     since 2005    Sleep apnea 10/26/01    uses C pap    Stroke [facility])     TIA 2004    Syncope 2/7/2011    asystole (long) when getting Picc line at [facility], was admitted for pneumonia       PSH:  Past Surgical History:   Procedure Laterality Date    ABDOMEN SURGERY PROC UNLISTED      GASTRIC BYPASS,OBESE<150CM TODD-EN-Y  1/7/02    Dr Pam Nash    HX Ardie Clines HX GI      lap gastric bypass 1/7/02    HX GYN      cyst    HX ORTHOPAEDIC      carpal tunnel    HX OTHER SURGICAL      cyst removed from groin    HX OTHER SURGICAL      hernia repair       Allergies:   Allergies   Allergen Reactions    Pcn [Penicillins] Anaphylaxis, Hives and Itching                Hospital Medications:  Current Facility-Administered Medications   Medication Dose Route Frequency    sodium chloride (NS) flush 5-10 mL  5-10 mL IntraVENous Q8H    sodium chloride (NS) flush 5-10 mL  5-10 mL IntraVENous PRN    HYDROcodone-acetaminophen (NORCO) 5-325 mg per tablet        sodium chloride (NS) flush 5-10 mL  5-10 mL IntraVENous Q8H    sodium chloride (NS) flush 5-10 mL  5-10 mL IntraVENous PRN    acetaminophen (TYLENOL) tablet 650 mg  650 mg Oral Q6H PRN    0.9% sodium chloride infusion  75 mL/hr IntraVENous CONTINUOUS    oxyCODONE IR (ROXICODONE) tablet 5-10 mg  5-10 mg Oral Q4H PRN    HYDROmorphone (PF) (DILAUDID) injection 0.2 mg  0.2 mg IntraVENous Q4H PRN    naloxone (NARCAN) injection 0.4 mg  0.4 mg IntraVENous PRN    ondansetron (ZOFRAN) injection 4 mg  4 mg IntraVENous Q6H PRN    docusate sodium (COLACE) capsule 100 mg  100 mg Oral BID    insulin lispro (HUMALOG) injection   SubCUTAneous AC&HS    glucose chewable tablet 16 g  4 Tab Oral PRN    dextrose (D50W) injection syrg 12.5-25 g  12.5-25 g IntraVENous PRN    glucagon (GLUCAGEN) injection 1 mg  1 mg IntraMUSCular PRN    ALPRAZolam (XANAX) tablet 1 mg  1 mg Oral QID    divalproex DR (DEPAKOTE) tablet 500 mg  500 mg Oral BID    lisinopril (PRINIVIL, ZESTRIL) tablet 20 mg  20 mg Oral DAILY    citalopram (CELEXA) tablet 10 mg  10 mg Oral DAILY    therapeutic multivitamin (THERAGRAN) tablet 1 Tab  1 Tab Oral DAILY    megestrol (MEGACE) 400 mg/10 mL (10 mL) oral suspension 200 mg  200 mg Oral DAILY     Current Outpatient Prescriptions   Medication Sig    citalopram (CELEXA) 40 mg tablet Take 40 mg by mouth daily.  lisinopril (PRINIVIL, ZESTRIL) 20 mg tablet Take 20 mg by mouth two (2) times a day.  pantoprazole (PROTONIX) 40 mg tablet Take 40 mg by mouth daily.  nitroglycerin (NITROSTAT) 0.4 mg SL tablet 0.4 mg by SubLINGual route every five (5) minutes as needed for Chest Pain. Up to 3 doses.  multivitamin (ONE A DAY) tablet Take 1 Tab by mouth daily.  oxyCODONE-acetaminophen (PERCOCET) 5-325 mg per tablet Take 1 Tab by mouth every four (4) hours as needed for Pain.  oxybutynin (DITROPAN) 5 mg tablet Take 5 mg by mouth nightly.  tamsulosin (FLOMAX) 0.4 mg capsule Take 0.4 mg by mouth daily.  fluticasone (FLONASE ALLERGY RELIEF) 50 mcg/actuation nasal spray 2 Sprays by Both Nostrils route daily as needed for Rhinitis.     chlorpheniramine-HYDROcodone (TUSSIONEX PENNKINETIC ER) 10-8 mg/5 mL suspension Take 5 mL by mouth every twelve (12) hours as needed for Cough. Max Daily Amount: 10 mL.  albuterol (PROVENTIL HFA, VENTOLIN HFA, PROAIR HFA) 90 mcg/actuation inhaler Take 2 Puffs by inhalation every four (4) hours as needed for Wheezing.  metFORMIN (GLUCOPHAGE) 500 mg tablet Take 500 mg by mouth two (2) times daily (with meals).  sildenafil citrate (VIAGRA) 100 mg tablet Take 100 mg by mouth as needed (\"To set the mood\").  rivaroxaban (XARELTO) 20 mg tab tablet Take 20 mg by mouth daily.  divalproex DR (DEPAKOTE) 500 mg tablet Take 1 tablet by mouth two (2) times a day.  ALPRAZolam (XANAX) 1 mg tablet Take 1 mg by mouth four (4) times daily. Home Medications:  Prior to Admission Medications   Prescriptions Last Dose Informant Patient Reported? Taking? ALPRAZolam (XANAX) 1 mg tablet 2018 at Unknown time Self Yes No   Sig: Take 1 mg by mouth four (4) times daily. albuterol (PROVENTIL HFA, VENTOLIN HFA, PROAIR HFA) 90 mcg/actuation inhaler 2018 at Unknown time Self No Yes   Sig: Take 2 Puffs by inhalation every four (4) hours as needed for Wheezing. chlorpheniramine-HYDROcodone (TUSSIONEX PENNKINETIC ER) 10-8 mg/5 mL suspension 2018 at Unknown time Self No Yes   Sig: Take 5 mL by mouth every twelve (12) hours as needed for Cough. Max Daily Amount: 10 mL. citalopram (CELEXA) 40 mg tablet 2018 at Unknown time Self Yes Yes   Sig: Take 40 mg by mouth daily. divalproex DR (DEPAKOTE) 500 mg tablet 2018 at Unknown time Self No Yes   Sig: Take 1 tablet by mouth two (2) times a day. fluticasone (FLONASE ALLERGY RELIEF) 50 mcg/actuation nasal spray 2018 at Unknown time Self Yes Yes   Si Sprays by Both Nostrils route daily as needed for Rhinitis. lisinopril (PRINIVIL, ZESTRIL) 20 mg tablet 2018 at Unknown time Self Yes Yes   Sig: Take 20 mg by mouth two (2) times a day.    metFORMIN (GLUCOPHAGE) 500 mg tablet 2018 at Unknown time Self Yes Yes   Sig: Take 500 mg by mouth two (2) times daily (with meals). multivitamin (ONE A DAY) tablet 2018 at Unknown time Self Yes Yes   Sig: Take 1 Tab by mouth daily. nitroglycerin (NITROSTAT) 0.4 mg SL tablet 2018 at Unknown time Self Yes Yes   Si.4 mg by SubLINGual route every five (5) minutes as needed for Chest Pain. Up to 3 doses. oxyCODONE-acetaminophen (PERCOCET) 5-325 mg per tablet 7/10/2018 at Unknown time Self Yes Yes   Sig: Take 1 Tab by mouth every four (4) hours as needed for Pain. oxybutynin (DITROPAN) 5 mg tablet 2018 at Unknown time Self Yes Yes   Sig: Take 5 mg by mouth nightly. pantoprazole (PROTONIX) 40 mg tablet 2018 at Unknown time Self Yes Yes   Sig: Take 40 mg by mouth daily. rivaroxaban (XARELTO) 20 mg tab tablet 2018 at Unknown time Self Yes Yes   Sig: Take 20 mg by mouth daily. sildenafil citrate (VIAGRA) 100 mg tablet 2018 at Unknown time Self Yes Yes   Sig: Take 100 mg by mouth as needed (\"To set the mood\"). tamsulosin (FLOMAX) 0.4 mg capsule 2018 at Unknown time Self Yes Yes   Sig: Take 0.4 mg by mouth daily. Facility-Administered Medications: None       Social History:  Social History   Substance Use Topics    Smoking status: Former Smoker     Packs/day: 2.00     Types: Cigarettes    Smokeless tobacco: Never Used      Comment: quit [id number]    Alcohol use 0.6 oz/week     1 Glasses of wine per week      Comment: occas       Family History:  Family History   Problem Relation Age of Onset    Hypertension Mother     Heart Disease Mother     Stroke Mother     Cancer Mother     Diabetes Father     Hypertension Father     Sickle Cell Trait Sister     Hypertension Sister     Bleeding Prob Sister     Hypertension Brother        Review of Systems:  A detailed 10 system ROS is obtained, with pertinent positives as listed in the HPI and PMH. All others are negative.     Constitutional: negative fever, negative chills, negative weight loss  Eyes:   negative visual changes  ENT:   negative sore throat, tongue or lip swelling  Respiratory:  negative cough, negative dyspnea  Cards:  negative for chest pain, palpitations, lower extremity edema  GI:   See HPI  :  negative for frequency, dysuria  Integument:  negative for rash and pruritus  Heme:  negative for easy bruising and gum/nose bleeding  Musculoskel: negative for myalgias,  back pain and muscle weakness  Neuro:  negative for headaches, dizziness, vertigo  Psych:  negative for feelings of anxiety, depression       Objective:   Patient Vitals for the past 8 hrs:   BP Pulse Resp SpO2   07/11/18 0843 114/68 86 24 -   07/11/18 0804 (!) 127/92 - - -   07/11/18 0734 - 94 12 99 %   07/11/18 0720 112/73 96 - 97 %             EXAM:     NEURO-a&o   HEENT-wnl   LUNGS-clear    COR-regular rate and rhythym     ABD-soft , no tenderness, no rebound, good bs     EXT-no edema     Data Review     Recent Labs      07/11/18 0418   WBC  3.9*   HGB  12.7   HCT  38.9   PLT  188     Recent Labs      07/11/18 0418   NA  136   K  3.3*   CL  100   CO2  25   BUN  12   CREA  1.04   GLU  315*   CA  8.5     Recent Labs      07/11/18 0418   SGOT  22   AP  215*   TBILI  0.4   TP  7.0   ALB  2.5*   GLOB  4.5*   LPSE  279   ALT  19     Recent Labs      07/11/18 0418   SGOT  22   AP  215*   TP  7.0   ALB  2.5*   GLOB  4.5*   LPSE  279     Recent Labs      07/11/18 0418   INR  1.3*   PTP  13.2*   APTT  34.1*        IMAGING RESULTS:   []      I have personally reviewed the actual   []    CXR  []    CT  []     Howard Curiel[address] discussed with:    []    Patient   []    Family   []    Nursing   []    Attending    Ximena Gill MD

## 2018-07-11 NOTE — ED NOTES
Bedside and Verbal shift change report given to Lester Wolfe and Janine RN (oncoming nurse) by Risa Briggs RN (offgoing nurse). Report included the following information SBAR, Kardex, ED Summary, [facility] and Recent Results.

## 2018-07-11 NOTE — PROGRESS NOTES
Pharmacy Clarification of the Prior to Admission Medication Regimen Retrospective to the Admission Medication Reconciliation    The patient was interviewed regarding clarification of the prior to admission medication regimen and was questioned regarding use of any other inhalers, topical products, over the counter medications, herbal medications, vitamin products or ophthalmic/nasal/otic medication use. Information Obtained From: Rx Query and patient    Recommendations/Findings: The following amendments were made to the patient's active medication list on file at [address]:     1) Additions:    fluticasone (FLONASE ALLERGY RELIEF) 50 mcg/actuation nasal spray   multivitamin (ONE A DAY) tablet   nitroglycerin (NITROSTAT) 0.4 mg SL tablet   oxyCODONE-acetaminophen (PERCOCET) 5-325 mg per tablet   oxybutynin (DITROPAN) 5 mg tablet   pantoprazole (PROTONIX) 40 mg tablet   tamsulosin (FLOMAX) 0.4 mg capsule    2) Removals: None      3) Changes:   citalopram (CELEXA) (Old regimen: (strength: 20 mg) 10 mg daily /New regimen: (strength: 40 mg) 40 mg daily)   lisinopril (PRINIVIL, ZESTRIL)  (Old regimen: (strength: None) 20 mg BID /New regimen: (strength: 20 mg) 20 mg BID)   sildenafil citrate (VIAGRA) 100 mg tablet (Old regimen: 100 mg by mouth /New regimen: 100 mg PRN)    4) Pertinent Pharmacy Findings:   Updated patients preferred outpatient pharmacy to: [facility] [address] [place] [address] [address]   ALPRAZolam (XANAX) 1 mg tablet: Patient stated he ran out of this medication \"about a week ago. \"   Identified High Alert Medication Information  o Current Anticoagulants:  - Name: rivaroxaban (XARELTO) 20 mg tab tablet     PTA medication list was corrected to the following:     Prior to Admission Medications   Prescriptions Last Dose Informant Patient Reported? Taking? ALPRAZolam (XANAX) 1 mg tablet 7/4/2018 at Unknown time Self Yes No   Sig: Take 1 mg by mouth four (4) times daily.    albuterol (PROVENTIL HFA, VENTOLIN HFA, PROAIR HFA) 90 mcg/actuation inhaler 2018 at Unknown time Self No Yes   Sig: Take 2 Puffs by inhalation every four (4) hours as needed for Wheezing. chlorpheniramine-HYDROcodone (TUSSIONEX PENNKINETIC ER) 10-8 mg/5 mL suspension 2018 at Unknown time Self No Yes   Sig: Take 5 mL by mouth every twelve (12) hours as needed for Cough. Max Daily Amount: 10 mL. citalopram (CELEXA) 40 mg tablet 2018 at Unknown time Self Yes Yes   Sig: Take 40 mg by mouth daily. divalproex DR (DEPAKOTE) 500 mg tablet 2018 at Unknown time Self No Yes   Sig: Take 1 tablet by mouth two (2) times a day. fluticasone (FLONASE ALLERGY RELIEF) 50 mcg/actuation nasal spray 2018 at Unknown time Self Yes Yes   Si Sprays by Both Nostrils route daily as needed for Rhinitis. lisinopril (PRINIVIL, ZESTRIL) 20 mg tablet 2018 at Unknown time Self Yes Yes   Sig: Take 20 mg by mouth two (2) times a day. metFORMIN (GLUCOPHAGE) 500 mg tablet 2018 at Unknown time Self Yes Yes   Sig: Take 500 mg by mouth two (2) times daily (with meals). multivitamin (ONE A DAY) tablet 2018 at Unknown time Self Yes Yes   Sig: Take 1 Tab by mouth daily. nitroglycerin (NITROSTAT) 0.4 mg SL tablet 2018 at Unknown time Self Yes Yes   Si.4 mg by SubLINGual route every five (5) minutes as needed for Chest Pain. Up to 3 doses. oxyCODONE-acetaminophen (PERCOCET) 5-325 mg per tablet 7/10/2018 at Unknown time Self Yes Yes   Sig: Take 1 Tab by mouth every four (4) hours as needed for Pain. oxybutynin (DITROPAN) 5 mg tablet 2018 at Unknown time Self Yes Yes   Sig: Take 5 mg by mouth nightly. pantoprazole (PROTONIX) 40 mg tablet 2018 at Unknown time Self Yes Yes   Sig: Take 40 mg by mouth daily. rivaroxaban (XARELTO) 20 mg tab tablet 2018 at Unknown time Self Yes Yes   Sig: Take 20 mg by mouth daily.    sildenafil citrate (VIAGRA) 100 mg tablet 2018 at Unknown time Self Yes Yes Sig: Take 100 mg by mouth as needed (\"To set the mood\"). tamsulosin (FLOMAX) 0.4 mg capsule 7/9/2018 at Unknown time Self Yes Yes   Sig: Take 0.4 mg by mouth daily.       Facility-Administered Medications: None          Thank you,  Chen Ellis, [facility]  Medication History Pharmacy Technician

## 2018-07-11 NOTE — ED NOTES
TRANSFER - OUT REPORT:    Verbal report given to JESÚS Warren(name) on [place]  being transferred to [facility](unit) for routine progression of care       Report consisted of patients Situation, Background, Assessment and   Recommendations(SBAR). Information from the following report(s) SBAR was reviewed with the receiving nurse. Lines:   Peripheral IV 07/11/18 Left Antecubital (Active)   Site Assessment Clean, dry, & intact 7/11/2018  4:39 AM   Phlebitis Assessment 0 7/11/2018  4:39 AM   Infiltration Assessment 0 7/11/2018  4:39 AM   Dressing Status Clean, dry, & intact 7/11/2018  4:39 AM   Dressing Type Tape;Transparent 7/11/2018  4:39 AM   Hub Color/Line Status Pink;Flushed;Patent 7/11/2018  4:39 AM   Action Taken Blood drawn 7/11/2018  4:39 AM        Opportunity for questions and clarification was provided.       Patient transported with:   [employer]

## 2018-07-11 NOTE — ED PROVIDER NOTES
EMERGENCY DEPARTMENT HISTORY AND PHYSICAL EXAM 
     
 
Date: 7/11/2018 Patient Name: Kash Leung History of Presenting Illness Chief Complaint Patient presents with  Abdominal Pain Ambulatory w/ c/o general abd pain x3 months w/ N/V and dark stools.  Anorexia History Provided By: Patient HPI: Kash Leung is a 63 y.o. male, pmhx migraines / seizures / HTN / DM / stroke / GERD, who presents ambulatory to the ED c/o intermittent episodes of diffuse burning generalized abd pain over the last 3 months. Pt reports additional nausea, vomiting, decreased PO intake, unexpected weight changes, CP, dyspnea on exertion, and loose stools. He states he believes he has lost ~90 pounds over the last 6 months. Pt notes all of his sxs are ongoing, stating he is currently followed at [facility]. He states he was advised to follow up with GI for an EGD / colonoscopy, but has not done so yet. He further denies any recent imaging studies of his abdomen. Pt reports being on an \"all liquid clear diet for the last 2 months. \" On further questioning, pt reports intermittent melanotic stools, but states he recently drank \"a whole bottle of Pepto Bismol. \" He notes a hx of seizure disorder for which he currently takes Depakote. P denies any additional medications for his current sxs. He notes adherence with his rx'd Xarelto for his hx of a-fib. Pt otherwise specifically denies any recent fever, chills, diarrhea, lightheadedness, dizziness, numbness, weakness, tingling, BLE swelling, HA, heart palpitations, urinary sxs, hematochezia, cough, or congestion. PCP: Nik Escalante MD 
 
PMHx: Significant for migraines, seizures, HTN, stroke, DM, GERD, depression PSHx: Significant for gastric bypass, orthopedic surgery, hernia repair Social Hx: -tobacco (former), +EtOH (occasionally), -Illicit Drugs There are no other complaints, changes, or physical findings at this time.   
 
Current Facility-Administered Medications Medication Dose Route Frequency Provider Last Rate Last Dose  sodium chloride (NS) flush 5-10 mL  5-10 mL IntraVENous [facility] April Raj Akins MD   10 mL at 07/11/18 4209  sodium chloride (NS) flush 5-10 mL  5-10 mL IntraVENous PRN April ARMANDO Harvey MD      
 HYDROcodone-acetaminophen [facility]) 5-325 mg per tablet Current Outpatient Prescriptions Medication Sig Dispense Refill  methocarbamol (ROBAXIN-750) 750 mg tablet Take 1 Tab by mouth four (4) times daily as needed. 20 Tab 0  chlorpheniramine-HYDROcodone (TUSSIONEX PENNKINETIC ER) 10-8 mg/5 mL suspension Take 5 mL by mouth every twelve (12) hours as needed for Cough. Max Daily Amount: 10 mL. 60 mL 0  
 albuterol (PROVENTIL HFA, VENTOLIN HFA, PROAIR HFA) 90 mcg/actuation inhaler Take 2 Puffs by inhalation every four (4) hours as needed for Wheezing. 1 Inhaler 0  
 metFORMIN (GLUCOPHAGE) 500 mg tablet Take 500 mg by mouth.  sildenafil citrate (VIAGRA) 100 mg tablet Take 100 mg by mouth.  rivaroxaban (XARELTO) 20 mg tab tablet Take  by mouth daily.  divalproex DR (DEPAKOTE) 500 mg tablet Take 1 tablet by mouth two (2) times a day. 90 tablet 3  
 simethicone (MYLICON) 80 mg chewable tablet Take 1 tablet by mouth four (4) times daily as needed. 30 tablet 0  
 omeprazole (PRILOSEC) 20 mg capsule Take 1 capsule by mouth daily. 30 capsule 0  
 LISINOPRIL PO Take 20 mg by mouth. Dose Unknown  metoprolol (LOPRESSOR) 100 mg tablet Take 1 tablet by mouth two (2) times a day. 1 tablet 0  
 aspirin delayed-release 81 mg tablet Take 81 mg by mouth daily.  citalopram (CELEXA) 20 mg tablet Take 0.5 Tabs by mouth daily. 30 Tab 3 Past History Past Medical History: 
Past Medical History:  
Diagnosis Date  Acquired lactose intolerance 12/1/2010  Atrial fibrillation ([facility] [address] 12/1/2010  Atrial septal aneurysm 3/12/2011  Depression  Diabetes ([facility] [address] 10/26/01  
 LOPEZ (dyspnea on exertion) 10/26/01  Essential hypertension  GERD (gastroesophageal reflux disease) 10/26/01  
 Headache(784.0) 10/26/01  Hypertension  IHSS (idiopathic hypertrophic subaortic stenosis) ([facility] [address] 12/1/2010  Morbid obesity ([address] 10/26/01  
 Other ill-defined conditions(799.89)   
 sleep apnea  Other ill-defined conditions(799.89) migraines  Pneumonia  Psychiatric disorder Depression since 2005  Secondary seizure disorder ([facility] [address] 12/1/2010  Seizures ([facility] [address]   
 since 2005  Sleep apnea 10/26/01  
 uses C pap  Stroke ([facility] [address] TIA 2004  Syncope 2/7/2011  
 asystole (long) when getting Picc line at [facility] ER, was admitted for pneumonia Past Surgical History: 
Past Surgical History:  
Procedure Laterality Date [address] [place] UNLISTED  GASTRIC BYPASS,OBESE<150CM TODD-EN-Y  1/7/02 Dr Jessica Hearn  HX CARPAL TUNNEL RELEASE  HX GI    
 lap gastric bypass 1/7/02  HX GYN    
 cyst  
 HX ORTHOPAEDIC    
 carpal tunnel  HX OTHER SURGICAL    
 cyst removed from groin  HX OTHER SURGICAL    
 hernia repair Family History: 
Family History Problem Relation Age of Onset  Hypertension Mother  Heart Disease Mother  Stroke Mother  Cancer Mother  Diabetes Father  Hypertension Father  Sickle Cell Trait Sister  Hypertension Sister  Bleeding Prob Sister  Hypertension Brother Social History: 
Social History Substance Use Topics  Smoking status: Former Smoker  Smokeless tobacco: Never Used  Alcohol use 1.8 oz/week 3 Glasses of wine per week Comment: occas Allergies: Allergies Allergen Reactions  Pcn [Penicillins] Anaphylaxis, Hives and Itching Review of Systems Review of Systems Constitutional: Positive for appetite change and unexpected weight change. Negative for chills and fever. HENT: Negative for congestion, ear pain, rhinorrhea and sore throat. Respiratory: Positive for shortness of breath. Negative for cough. Cardiovascular: Positive for chest pain. Negative for palpitations and leg swelling. Gastrointestinal: Positive for abdominal pain, nausea and vomiting. Negative for constipation and diarrhea.  
     + melena No hematochezia Endocrine: Negative for polyuria. Genitourinary: Negative for dysuria, frequency and hematuria. Neurological: Negative for dizziness, weakness, light-headedness, numbness and headaches. No tingling All other systems reviewed and are negative. Physical Exam  
Physical Exam  
Nursing note and vitals reviewed. General appearance - obese, well appearing, and in no distress Eyes - pupils equal and reactive, extraocular eye movements intact ENT - mucous membranes moist, pharynx normal without lesions Neck - supple, no significant adenopathy; non-tender to palpation Chest - clear to auscultation, no wheezes, rales or rhonchi; non-tender to palpation Heart - mildly tachycardic rate and irregularly irregular rhythm, S1 and S2 normal, no murmurs noted Abdomen - soft, tenderness to palpation across his upper abdomen, nondistended, no masses or organomegaly Musculoskeletal - no joint tenderness, deformity or swelling; normal ROM Extremities - peripheral pulses normal, no pedal edema Skin - normal coloration and turgor, no rashes Neurological - alert, oriented x3, normal speech, no focal findings or movement disorder noted Written by Shaina Badillo ED Scribe, as dictated by Jarvis Viramontes MD 
 
 
Diagnostic Study Results Labs - Recent Results (from the past 12 hour(s)) EKG, 12 LEAD, INITIAL Collection Time: 07/11/18  3:27 AM  
Result Value Ref Range Ventricular Rate 101 BPM  
 Atrial Rate 101 BPM  
 P-R Interval 154 ms QRS Duration 92 ms Q-T Interval 370 ms QTC Calculation (Bezet) 479 ms Calculated P Axis -2 degrees Calculated R Axis -34 degrees  Calculated T Axis 68 degrees Diagnosis Sinus tachycardia with premature atrial complexes Left axis deviation Anterior infarct (cited on or before 10-FEB-2018) When compared with ECG of 10-FEB-2018 11:02, 
premature atrial complexes are now present CBC WITH AUTOMATED DIFF Collection Time: 07/11/18  4:18 AM  
Result Value Ref Range WBC 3.9 (L) 4.1 - 11.1 K/uL  
 RBC 5.55 4.10 - 5.70 M/uL  
 HGB 12.7 12.1 - 17.0 g/dL HCT 38.9 36.6 - 50.3 % MCV 70.1 (L) 80.0 - 99.0 FL  
 MCH 22.9 (L) 26.0 - 34.0 PG  
 MCHC 32.6 30.0 - 36.5 g/dL  
 RDW 14.3 11.5 - 14.5 % PLATELET 927 500 - 533 K/uL NRBC 0.0 0  WBC ABSOLUTE NRBC 0.00 0.00 - 0.01 K/uL NEUTROPHILS 58 32 - 75 % LYMPHOCYTES 31 12 - 49 % MONOCYTES 9 5 - 13 % EOSINOPHILS 1 0 - 7 % BASOPHILS 1 0 - 1 % IMMATURE GRANULOCYTES 0 0.0 - 0.5 % ABS. NEUTROPHILS 2.3 1.8 - 8.0 K/UL  
 ABS. LYMPHOCYTES 1.2 0.8 - 3.5 K/UL  
 ABS. MONOCYTES 0.4 0.0 - 1.0 K/UL  
 ABS. EOSINOPHILS 0.1 0.0 - 0.4 K/UL  
 ABS. BASOPHILS 0.0 0.0 - 0.1 K/UL  
 ABS. IMM. GRANS. 0.0 0.00 - 0.04 K/UL  
 DF AUTOMATED METABOLIC PANEL, COMPREHENSIVE Collection Time: 07/11/18  4:18 AM  
Result Value Ref Range Sodium 136 136 - 145 mmol/L Potassium 3.3 (L) 3.5 - 5.1 mmol/L Chloride 100 97 - 108 mmol/L  
 CO2 25 21 - 32 mmol/L Anion gap 11 5 - 15 mmol/L Glucose 315 (H) 65 - 100 mg/dL BUN 12 6 - 20 MG/DL Creatinine 1.04 0.70 - 1.30 MG/DL  
 BUN/Creatinine ratio 12 12 - 20 GFR est AA >60 >60 ml/min/1.73m2 GFR est non-AA >60 >60 ml/min/1.73m2 Calcium 8.5 8.5 - 10.1 MG/DL Bilirubin, total 0.4 0.2 - 1.0 MG/DL  
 ALT (SGPT) 19 12 - 78 U/L  
 AST (SGOT) 22 15 - 37 U/L Alk. phosphatase 215 (H) 45 - 117 U/L Protein, total 7.0 6.4 - 8.2 g/dL Albumin 2.5 (L) 3.5 - 5.0 g/dL Globulin 4.5 (H) 2.0 - 4.0 g/dL A-G Ratio 0.6 (L) 1.1 - 2.2 LACTIC ACID Collection Time: 07/11/18  4:18 AM  
Result Value Ref Range  Lactic acid 1.1 0.4 - 2.0 MMOL/L  
LIPASE Collection Time: 07/11/18  4:18 AM  
Result Value Ref Range Lipase 279 73 - 393 U/L MAGNESIUM Collection Time: 07/11/18  4:18 AM  
Result Value Ref Range Magnesium 1.8 1.6 - 2.4 mg/dL CK W/ CKMB & INDEX Collection Time: 07/11/18  4:18 AM  
Result Value Ref Range CK 39 39 - 308 U/L  
 CK - MB <1.0 <3.6 NG/ML  
 CK-MB Index Cannot be calculated 0 - 2.5    
TROPONIN I Collection Time: 07/11/18  4:18 AM  
Result Value Ref Range Troponin-I, Qt. <0.05 <0.05 ng/mL URINALYSIS W/ REFLEX CULTURE Collection Time: 07/11/18  4:18 AM  
Result Value Ref Range Color YELLOW/STRAW Appearance CLEAR CLEAR Specific gravity 1.024 1.003 - 1.030    
 pH (UA) 6.0 5.0 - 8.0 Protein 30 (A) NEG mg/dL Glucose >1000 (A) NEG mg/dL Ketone TRACE (A) NEG mg/dL Blood NEGATIVE  NEG Urobilinogen 2.0 (H) 0.2 - 1.0 EU/dL Nitrites NEGATIVE  NEG Leukocyte Esterase NEGATIVE  NEG    
 WBC 0-4 0 - 4 /hpf  
 RBC 0-5 0 - 5 /hpf Epithelial cells FEW FEW /lpf Bacteria NEGATIVE  NEG /hpf  
 UA:UC IF INDICATED CULTURE NOT INDICATED BY UA RESULT CNI Hyaline cast 0-2 0 - 5 /lpf DRUG SCREEN, URINE Collection Time: 07/11/18  4:18 AM  
Result Value Ref Range AMPHETAMINES NEGATIVE  NEG    
 BARBITURATES NEGATIVE  NEG BENZODIAZEPINES NEGATIVE  NEG    
 COCAINE POSITIVE (A) NEG METHADONE NEGATIVE  NEG    
 OPIATES NEGATIVE  NEG    
 PCP(PHENCYCLIDINE) NEGATIVE  NEG    
 THC (TH-CANNABINOL) NEGATIVE  NEG Drug screen comment (NOTE) PROTHROMBIN TIME + INR Collection Time: 07/11/18  4:18 AM  
Result Value Ref Range INR 1.3 (H) 0.9 - 1.1 Prothrombin time 13.2 (H) 9.0 - 11.1 sec PTT Collection Time: 07/11/18  4:18 AM  
Result Value Ref Range aPTT 34.1 (H) 22.1 - 32.0 sec  
 aPTT, therapeutic range     58.0 - 77.0 SECS  
BILIRUBIN, CONFIRM Collection Time: 07/11/18  4:18 AM  
Result Value Ref Range  Bilirubin UA, confirm NEGATIVE  NEG Radiologic Studies -  
 
CT Results  (Last 48 hours) 07/11/18 0554  CT ABD PELV W CONT Final result Impression:  IMPRESSION:  
Pancreatic body mass with assoc. Numerous hepatic masses compatible with  
pancreatic carcinoma and metastatic disease. Narrative:  EXAM:  CT ABD PELV W CONT INDICATION: Abdominal pain  generalized pain for 3 months COMPARISON: 2013 CONTRAST:  100 mL of Isovue-370. TECHNIQUE:   
Following the uneventful intravenous administration of contrast, thin axial  
images were obtained through the abdomen and pelvis. Coronal and sagittal  
reconstructions were generated. Oral contrast was not administered. CT dose  
reduction was achieved through use of a standardized protocol tailored for this  
examination and automatic exposure control for dose modulation. FINDINGS:   
LUNG BASES: Clear. INCIDENTALLY IMAGED HEART AND MEDIASTINUM: Small pericardial effusion. LIVER: There are multiple hypodense masses within the liver. GALLBLADDER: Unremarkable. SPLEEN: No mass. PANCREAS: 5 cm mass in the pancreatic body with distal pancreatic duct  
dilatation. ADRENALS: Unremarkable. KIDNEYS: No mass, calculus, or hydronephrosis. STOMACH: Unremarkable. SMALL BOWEL: No dilatation or wall thickening. COLON: No dilatation or wall thickening. APPENDIX: Unremarkable. PERITONEUM: No ascites or pneumoperitoneum. RETROPERITONEUM: No lymphadenopathy or aortic aneurysm. REPRODUCTIVE ORGANS:  
URINARY BLADDER: No mass or calculus. BONES: No destructive bone lesion. ADDITIONAL COMMENTS: N/A  
   
  
  
 
CXR Results  (Last 48 hours) 07/11/18 0406  XR ABD ACUTE W 1 V CHEST Final result Impression:  IMPRESSION: Constipation. Narrative:  History: Abdomen pain. EXAM:  XR ABD ACUTE W 1 V CHEST INDICATION:  abd pain COMPARISON: None.   
   
FINDINGS: The upright chest radiograph demonstrates clear lungs and normal  
cardiac and mediastinal contours. There is no pleural effusion or free air under  
the diaphragm. Supine and upright views of the abdomen demonstrate moderate stool throughout  
the colon. There are clips in the abdomen. There is no free intraperitoneal air. No soft tissue masses or pathologic calcifications are identified. There are  
degenerative changes of the spine. Medical Decision Making I am the first provider for this patient. I reviewed the vital signs, available nursing notes, past medical history, past surgical history, family history and social history. Vital Signs-Reviewed the patient's vital signs. Patient Vitals for the past 12 hrs: 
 Temp Pulse Resp BP SpO2  
07/11/18 0314 98.2 °F (36.8 °C) (!) 108 17 (!) 142/102 99 % Pulse Oximetry Analysis - 99% on RA Cardiac Monitor:  
Rate: 105bpm 
Rhythm: atrial fibrillation Records Reviewed: Nursing Notes, Old Medical Records, Previous electrocardiograms, Previous Radiology Studies and Previous Laboratory Studies Provider Notes (Medical Decision Making): DDx: gastritis, gastroenteritis, PUD, viral syndrome, dehydration, electrolyte abnormality, UTI, pancreatitis, cholecystitis, malignancy ED Course:  
Initial assessment performed. The patients presenting problems have been discussed, and they are in agreement with the care plan formulated and outlined with them. I have encouraged them to ask questions as they arise throughout their visit. EKG interpretation: (Preliminary) 0330 Rhythm: sinus tachycardia and PAC's. Rate (approx.): 101bpm; Axis: left axis deviation; Normal [place], QRS, QTc intervals; ST/T wave: non-specific changes; Other findings: possible ischemia. Written by Kvng Felix, ED Scribe, as dictated by Drea Cordoba MD 
 
CONSULT NOTE:  
6:47 AM 
Drea Cordoba MD spoke with Dr. Jessica Auguste, Specialty: Hospitalist 
Discussed pt's hx, disposition, and available diagnostic and imaging results. Reviewed care plans. Consultant will evaluate pt for admission. Written by DENY Martino, as dictated by Gustavo Montano MD 
 
 
Diagnosis Clinical Impression: 1. Abdominal pain, epigastric 2. Pancreatic cancer metastasized to liver [facility]) PLAN: 
1. Admit to hospitalist 
 
Disposition: 
 
ADMISSION NOTE: 
6:48 AM 
Patient is being admitted to the hospital by Dr. Jojo Shrestha. The results of their tests and reasons for their admission have been discussed with them and/or available family. They convey agreement and understanding for the need to be admitted and for their admission diagnosis. Written by DENY Thompson, as dictated by Gustavo Montano MD. Attestations: This note is prepared by Rahul Walters, acting as Scribe for MD Catalina Colmenares MD : The scribe's documentation has been prepared under my direction and personally reviewed by me in its entirety. I confirm that the note above accurately reflects all work, treatment, procedures, and medical decision making performed by me. This note will not be viewable in [address]
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
## 2018-07-11 NOTE — ED NOTES
Care assumed after report from Serenity Ku RN. Pt resting comfortably at this time. Requested and rec'd ice chips. Also requested a meal tray. Awaiting admission bed.

## 2018-07-11 NOTE — ED NOTES
Attempted to give pt ordered insulin for elevated blood sugar. Pt refused. Stated that his blood sugar is up due to mass in pancreas. Instructed pt that the blood sugar still needed to be treated, irregardless of cause of elevation. Pt stated that he would continue with his PO meds, but not the insulin.

## 2018-07-11 NOTE — CONSULTS
Hematology Oncology Consultation    Reason for consult: Pancreatic mass    Admitting Diagnosis: Pancreatic mass  Hypokalemia  Hyperglycemia due to type 2 diabetes mellitus (HCC)  Weight loss, abnormal  pancreatic mass    Impression: Mr Janna Goodrich is a 62 yo male admitted after he was found to have a newly discovered pancreatic body mass and liver lesions:    *) Pancreatic body mass:  - CT A/P shows 5cm pancreatic body mass concerning for primary pancreatic carcinoma  - CA 19-9 pending  - GI has been consulted and planning EUS/Bx once Xarelto has worn off.  - cont holding Xarelto, last dose 7/10  - Will need bx results prior to making final tx plan  - Will need to complete staging with chest CT, will get this Friday or as outpt if d/c prior to Friday. Will not want to give more dye today given recent scan and pt is diabetic.  - Will need port placed as well once xarelto not on board. - He will f/u with [facility], Dr. Lydia Frias per his request since this was his wife's Oncologist.    *) Liver lesions:  - Most likely due to metastatic disease from presumed pancreatic primary malignancy. Will await bx of pancreatic mass. This will likely stage him as stage IV and carries a poor prognosis. *) Severe wt loss:  - Nutrition consult placed  - Encourage supplementation with Ensure. Certainly related to his underlying malignancy. *) Paroxysmal A Fib:  - Has been on Xarelto, now on hold for biopsy and port placement. *) UDS +Cocaine/marijuana use:  - Denies recent cocain use, this will certainly BE A PROBLEM  If he continues this while on treatment. Thank you for this kind consult, we will follow along with you. Pt stating he needs to leave by Friday, if he does leave early prior to completion of w/u we will ensure f/u as outpt. Discussion: Bill Li is a  63y.o. year old seen in consultation at the request of Dr. Hafsa Reilly for pancreatic mass.   Mr Janna Goodrich presented to the ER with abdominal burning pains that have been ongoing for the last ~3 months, he has had associated ~90lb wt loss over the last several months due to no appetite. He had not had any abdominal imaging as outpt, however, CT a/p in ED showed 5cm pancreatic body mass and lesions in the liver worrisome for metastatic disease. He is on Xarelto for his A fib and his last dose was 7/10. He has noticed some dark stools at home, mild nausea no emesis. He states he wants to follow with Dr. Lizzette Lloyd since he took care of his wife who  5 yrs ago for breast cancer. Today, he tells me he has to be discharged prior to the weekend since he has important business. Imagin18 CT A/P:  IMPRESSION:  Pancreatic body mass with assoc. Numerous hepatic masses compatible with  pancreatic carcinoma and metastatic disease.     Labs:    Recent Results (from the past 24 hour(s))   EKG, 12 LEAD, INITIAL    Collection Time: 18  3:27 AM   Result Value Ref Range    Ventricular Rate 101 BPM    Atrial Rate 101 BPM    P-R Interval 154 ms    QRS Duration 92 ms    Q-T Interval 370 ms    QTC Calculation (Bezet) 479 ms    Calculated P Axis -2 degrees    Calculated R Axis -34 degrees    Calculated T Axis 68 degrees    Diagnosis       Sinus tachycardia with premature atrial complexes  Left axis deviation  When compared with ECG of 10-FEB-2018 11:02,  premature atrial complexes are now present  Confirmed by Vicente Villavicencio ([id number]) on 2018 11:59:00 AM     CBC WITH AUTOMATED DIFF    Collection Time: 18  4:18 AM   Result Value Ref Range    WBC 3.9 (L) 4.1 - 11.1 K/uL    RBC 5.55 4.10 - 5.70 M/uL    HGB 12.7 12.1 - 17.0 g/dL    HCT 38.9 36.6 - 50.3 %    MCV 70.1 (L) 80.0 - 99.0 FL    MCH 22.9 (L) 26.0 - 34.0 PG    MCHC 32.6 30.0 - 36.5 g/dL    RDW 14.3 11.5 - 14.5 %    PLATELET 745 940 - 379 K/uL    NRBC 0.0 0  WBC    ABSOLUTE NRBC 0.00 0.00 - 0.01 K/uL    NEUTROPHILS 58 32 - 75 %    LYMPHOCYTES 31 12 - 49 %    MONOCYTES 9 5 - 13 %    EOSINOPHILS 1 0 - 7 %    BASOPHILS 1 0 - 1 %    IMMATURE GRANULOCYTES 0 0.0 - 0.5 %    ABS. NEUTROPHILS 2.3 1.8 - 8.0 K/UL    ABS. LYMPHOCYTES 1.2 0.8 - 3.5 K/UL    ABS. MONOCYTES 0.4 0.0 - 1.0 K/UL    ABS. EOSINOPHILS 0.1 0.0 - 0.4 K/UL    ABS. BASOPHILS 0.0 0.0 - 0.1 K/UL    ABS. IMM. GRANS. 0.0 0.00 - 0.04 K/UL    DF AUTOMATED     METABOLIC PANEL, COMPREHENSIVE    Collection Time: 07/11/18  4:18 AM   Result Value Ref Range    Sodium 136 136 - 145 mmol/L    Potassium 3.3 (L) 3.5 - 5.1 mmol/L    Chloride 100 97 - 108 mmol/L    CO2 25 21 - 32 mmol/L    Anion gap 11 5 - 15 mmol/L    Glucose 315 (H) 65 - 100 mg/dL    BUN 12 6 - 20 MG/DL    Creatinine 1.04 0.70 - 1.30 MG/DL    BUN/Creatinine ratio 12 12 - 20      GFR est AA >60 >60 ml/min/1.73m2    GFR est non-AA >60 >60 ml/min/1.73m2    Calcium 8.5 8.5 - 10.1 MG/DL    Bilirubin, total 0.4 0.2 - 1.0 MG/DL    ALT (SGPT) 19 12 - 78 U/L    AST (SGOT) 22 15 - 37 U/L    Alk.  phosphatase 215 (H) 45 - 117 U/L    Protein, total 7.0 6.4 - 8.2 g/dL    Albumin 2.5 (L) 3.5 - 5.0 g/dL    Globulin 4.5 (H) 2.0 - 4.0 g/dL    A-G Ratio 0.6 (L) 1.1 - 2.2     LACTIC ACID    Collection Time: 07/11/18  4:18 AM   Result Value Ref Range    Lactic acid 1.1 0.4 - 2.0 MMOL/L   LIPASE    Collection Time: 07/11/18  4:18 AM   Result Value Ref Range    Lipase 279 73 - 393 U/L   MAGNESIUM    Collection Time: 07/11/18  4:18 AM   Result Value Ref Range    Magnesium 1.8 1.6 - 2.4 mg/dL   CK W/ CKMB & INDEX    Collection Time: 07/11/18  4:18 AM   Result Value Ref Range    CK 39 39 - 308 U/L    CK - MB <1.0 <3.6 NG/ML    CK-MB Index Cannot be calculated 0 - 2.5     TROPONIN I    Collection Time: 07/11/18  4:18 AM   Result Value Ref Range    Troponin-I, Qt. <0.05 <0.05 ng/mL   URINALYSIS W/ REFLEX CULTURE    Collection Time: 07/11/18  4:18 AM   Result Value Ref Range    Color YELLOW/STRAW      Appearance CLEAR CLEAR      Specific gravity 1.024 1.003 - 1.030      pH (UA) 6.0 5.0 - 8.0      Protein 30 (A) NEG mg/dL Glucose >1000 (A) NEG mg/dL    Ketone TRACE (A) NEG mg/dL    Blood NEGATIVE  NEG      Urobilinogen 2.0 (H) 0.2 - 1.0 EU/dL    Nitrites NEGATIVE  NEG      Leukocyte Esterase NEGATIVE  NEG      WBC 0-4 0 - 4 /hpf    RBC 0-5 0 - 5 /hpf    Epithelial cells FEW FEW /lpf    Bacteria NEGATIVE  NEG /hpf    UA:UC IF INDICATED CULTURE NOT INDICATED BY UA RESULT CNI      Hyaline cast 0-2 0 - 5 /lpf   DRUG SCREEN, URINE    Collection Time: 07/11/18  4:18 AM   Result Value Ref Range    AMPHETAMINES NEGATIVE  NEG      BARBITURATES NEGATIVE  NEG      BENZODIAZEPINES NEGATIVE  NEG      COCAINE POSITIVE (A) NEG      METHADONE NEGATIVE  NEG      OPIATES NEGATIVE  NEG      PCP(PHENCYCLIDINE) NEGATIVE  NEG      THC (TH-CANNABINOL) NEGATIVE  NEG      Drug screen comment (NOTE)    PROTHROMBIN TIME + INR    Collection Time: 07/11/18  4:18 AM   Result Value Ref Range    INR 1.3 (H) 0.9 - 1.1      Prothrombin time 13.2 (H) 9.0 - 11.1 sec   PTT    Collection Time: 07/11/18  4:18 AM   Result Value Ref Range    aPTT 34.1 (H) 22.1 - 32.0 sec    aPTT, therapeutic range     58.0 - 77.0 SECS   BILIRUBIN, CONFIRM    Collection Time: 07/11/18  4:18 AM   Result Value Ref Range    Bilirubin UA, confirm NEGATIVE  NEG     GLUCOSE, POC    Collection Time: 07/11/18 11:58 AM   Result Value Ref Range    Glucose (POC) 381 (H) 65 - 100 mg/dL    Performed by Hillary Campa        History:  Past Medical History:   Diagnosis Date    Acquired lactose intolerance 12/1/2010    Atrial fibrillation ([facility] 12/1/2010    Atrial septal aneurysm 3/12/2011    Depression     Diabetes ([facility] 10/26/01    LOPEZ (dyspnea on exertion) 10/26/01    Essential hypertension     GERD (gastroesophageal reflux disease) 10/26/01    Headache(784.0) 10/26/01    Hypertension     IHSS (idiopathic hypertrophic subaortic stenosis) ([facility] 12/1/2010    Morbid obesity ([facility] 10/26/01    Other ill-defined conditions(799.89)     sleep apnea    Other ill-defined conditions(799.89) migraines    Pneumonia     Psychiatric disorder     Depression since 2005    Secondary seizure disorder ([facility] 12/1/2010    Seizures ([facility]     since 2005    Sleep apnea 10/26/01    uses C pap    Stroke [facility])     TIA 2004    Syncope 2/7/2011    asystole (long) when getting Picc line at [facility], was admitted for pneumonia      Past Surgical History:   Procedure Laterality Date    ABDOMEN SURGERY PROC UNLISTED      GASTRIC BYPASS,OBESE<150CM TODD-EN-Y  1/7/02    Dr Pam Nash    HX CARPAL TUNNEL RELEASE      HX GI      lap gastric bypass 1/7/02    HX GYN      cyst    HX ORTHOPAEDIC      carpal tunnel    HX OTHER SURGICAL      cyst removed from groin    HX OTHER SURGICAL      hernia repair      Prior to Admission medications    Medication Sig Start Date End Date Taking? Authorizing Provider   citalopram (CELEXA) 40 mg tablet Take 40 mg by mouth daily. Yes Historical Provider   lisinopril (PRINIVIL, ZESTRIL) 20 mg tablet Take 20 mg by mouth two (2) times a day. Yes Historical Provider   pantoprazole (PROTONIX) 40 mg tablet Take 40 mg by mouth daily. Yes Historical Provider   nitroglycerin (NITROSTAT) 0.4 mg SL tablet 0.4 mg by SubLINGual route every five (5) minutes as needed for Chest Pain. Up to 3 doses. Yes Historical Provider   multivitamin (ONE A DAY) tablet Take 1 Tab by mouth daily. Yes Historical Provider   oxyCODONE-acetaminophen (PERCOCET) 5-325 mg per tablet Take 1 Tab by mouth every four (4) hours as needed for Pain. Yes Historical Provider   oxybutynin (DITROPAN) 5 mg tablet Take 5 mg by mouth nightly. Yes Historical Provider   tamsulosin (FLOMAX) 0.4 mg capsule Take 0.4 mg by mouth daily. Yes Historical Provider   fluticasone (FLONASE ALLERGY RELIEF) 50 mcg/actuation nasal spray 2 Sprays by Both Nostrils route daily as needed for Rhinitis.    Yes Historical Provider   chlorpheniramine-HYDROcodone Lo Antu PENNKINETIC ER) 10-8 mg/5 mL suspension Take 5 mL by mouth every twelve (12) hours as needed for Cough. Max Daily Amount: 10 mL. 2/10/18  Yes Richy Hairston MD   albuterol (PROVENTIL HFA, VENTOLIN HFA, PROAIR HFA) 90 mcg/actuation inhaler Take 2 Puffs by inhalation every four (4) hours as needed for Wheezing. 2/10/18  Yes Richy Hairston MD   metFORMIN (GLUCOPHAGE) 500 mg tablet Take 500 mg by mouth two (2) times daily (with meals). 7/21/17 7/21/18 Yes Historical Provider   sildenafil citrate (VIAGRA) 100 mg tablet Take 100 mg by mouth as needed (\"To set the mood\"). 7/21/17  Yes Historical Provider   rivaroxaban (XARELTO) 20 mg tab tablet Take 20 mg by mouth daily. Yes Historical Provider   divalproex DR (DEPAKOTE) 500 mg tablet Take 1 tablet by mouth two (2) times a day. 9/10/14  Yes Roro Friday, MD   ALPRAZolam Luisa Moose) 1 mg tablet Take 1 mg by mouth four (4) times daily.     Historical Provider     Allergies   Allergen Reactions    Pcn [Penicillins] Anaphylaxis, Hives and Itching             Social History   Substance Use Topics    Smoking status: Former Smoker     Packs/day: 2.00     Types: Cigarettes    Smokeless tobacco: Never Used      Comment: quit [id number]    Alcohol use 0.6 oz/week     1 Glasses of wine per week      Comment: occas      Family History   Problem Relation Age of Onset    Hypertension Mother     Heart Disease Mother     Stroke Mother     Cancer Mother     Diabetes Father     Hypertension Father     Sickle Cell Trait Sister     Hypertension Sister     Bleeding Prob Sister     Hypertension Brother         Current Medications:  Current Facility-Administered Medications   Medication Dose Route Frequency    sodium chloride (NS) flush 5-10 mL  5-10 mL IntraVENous Q8H    sodium chloride (NS) flush 5-10 mL  5-10 mL IntraVENous PRN    HYDROcodone-acetaminophen (NORCO) 5-325 mg per tablet        sodium chloride (NS) flush 5-10 mL  5-10 mL IntraVENous Q8H    sodium chloride (NS) flush 5-10 mL  5-10 mL IntraVENous PRN    acetaminophen (TYLENOL) tablet 650 mg  650 mg Oral Q6H PRN    0.9% sodium chloride infusion  75 mL/hr IntraVENous CONTINUOUS    oxyCODONE IR (ROXICODONE) tablet 5-10 mg  5-10 mg Oral Q4H PRN    HYDROmorphone (PF) (DILAUDID) injection 0.2 mg  0.2 mg IntraVENous Q4H PRN    naloxone (NARCAN) injection 0.4 mg  0.4 mg IntraVENous PRN    ondansetron (ZOFRAN) injection 4 mg  4 mg IntraVENous Q6H PRN    docusate sodium (COLACE) capsule 100 mg  100 mg Oral BID    insulin lispro (HUMALOG) injection   SubCUTAneous AC&HS    glucose chewable tablet 16 g  4 Tab Oral PRN    dextrose (D50W) injection syrg 12.5-25 g  12.5-25 g IntraVENous PRN    glucagon (GLUCAGEN) injection 1 mg  1 mg IntraMUSCular PRN    ALPRAZolam (XANAX) tablet 1 mg  1 mg Oral QID    divalproex DR (DEPAKOTE) tablet 500 mg  500 mg Oral BID    lisinopril (PRINIVIL, ZESTRIL) tablet 20 mg  20 mg Oral DAILY    citalopram (CELEXA) tablet 10 mg  10 mg Oral DAILY    therapeutic multivitamin (THERAGRAN) tablet 1 Tab  1 Tab Oral DAILY    megestrol (MEGACE) 400 mg/10 mL (10 mL) oral suspension 200 mg  200 mg Oral DAILY     Current Outpatient Prescriptions   Medication Sig    citalopram (CELEXA) 40 mg tablet Take 40 mg by mouth daily.  lisinopril (PRINIVIL, ZESTRIL) 20 mg tablet Take 20 mg by mouth two (2) times a day.  pantoprazole (PROTONIX) 40 mg tablet Take 40 mg by mouth daily.  nitroglycerin (NITROSTAT) 0.4 mg SL tablet 0.4 mg by SubLINGual route every five (5) minutes as needed for Chest Pain. Up to 3 doses.  multivitamin (ONE A DAY) tablet Take 1 Tab by mouth daily.  oxyCODONE-acetaminophen (PERCOCET) 5-325 mg per tablet Take 1 Tab by mouth every four (4) hours as needed for Pain.  oxybutynin (DITROPAN) 5 mg tablet Take 5 mg by mouth nightly.  tamsulosin (FLOMAX) 0.4 mg capsule Take 0.4 mg by mouth daily.  fluticasone (FLONASE ALLERGY RELIEF) 50 mcg/actuation nasal spray 2 Sprays by Both Nostrils route daily as needed for Rhinitis.     chlorpheniramine-HYDROcodone (TUSSIONEX PENNKINETIC ER) 10-8 mg/5 mL suspension Take 5 mL by mouth every twelve (12) hours as needed for Cough. Max Daily Amount: 10 mL.  albuterol (PROVENTIL HFA, VENTOLIN HFA, PROAIR HFA) 90 mcg/actuation inhaler Take 2 Puffs by inhalation every four (4) hours as needed for Wheezing.  metFORMIN (GLUCOPHAGE) 500 mg tablet Take 500 mg by mouth two (2) times daily (with meals).  sildenafil citrate (VIAGRA) 100 mg tablet Take 100 mg by mouth as needed (\"To set the mood\").  rivaroxaban (XARELTO) 20 mg tab tablet Take 20 mg by mouth daily.  divalproex DR (DEPAKOTE) 500 mg tablet Take 1 tablet by mouth two (2) times a day.  ALPRAZolam (XANAX) 1 mg tablet Take 1 mg by mouth four (4) times daily. Review of Systems:  Constitutional No fevers, chills, night sweats, excessive fatigue. + unintentional weight loss. Allergic/Immunologic No recent allergic reactions   Eyes No significant visual difficulties. No diplopia. ENMT No problems with hearing, no sore throat, no sinus drainage. Endocrine No hot flashes or night sweats. No cold intolerance, polyuria, or polydipsia   Hematologic/Lymphatic No easy bruising or bleeding. The patient denies any tender or palpable lymph nodes   Breasts No abnormal masses of breast   Respiratory No dyspnea on exertion, orthopnea, chest pain, cough or hemoptysis. Cardiovascular No anginal chest pain, + irregular heart beat. No tachycardia, palpitations or orthopnea. Gastrointestinal + nausea, NO vomiting, diarrhea, constipation, dysphagia. + reflux, heartburn, dark stools. Genitourinary (M) No hematuria, dysuria, increased frequency, urgency, hesitancy or incontinence. Musculoskeletal No joint pain, swelling or redness. No decreased range of motion. Integumentary No chronic rashes, inflammation, ulcerations, pruritus, petechiae, purpura, ecchymoses, or skin changes.    Neurologic No headache, blurred vision, and no areas of focal weakness or numbness. Normal gait. No sensory problems. Psychiatric No insomnia, depression, dasia or mood swings. No psychotropic drugs. Physical Exam:  Constitutional Alert, cooperative, oriented. Mood and affect appropriate. overweight with loose skin under arms apparent. Head Normocephalic; no scars   Eyes Conjunctivae and sclerae are clear and without icterus. Pupils are reactive and equal.   ENMT Sinuses are nontender. No oral exudates, ulcers, masses, thrush or mucositis. Oropharynx clear. Tongue normal.   Neck Supple without masses or thyromegaly. No jugular venous distension. Hematologic/Lymphatic No petechiae or purpura. No tender or palpable lymph nodes in the cervical, supraclavicular, axillary or inguinal area. Respiratory Lungs are clear to auscultation without rhonchi or wheezing. Cardiovascular Irregularly irregular with VALERIE IV/VI RUSB   Chest / Line Site Chest is symmetric with no chest wall deformities. Abdomen +BS, TTP epigastric region, no rebound. Musculoskeletal No tenderness or swelling, normal range of motion without obvious weakness. Extremities No visible deformities, no cyanosis, clubbing or edema. Skin No rashes, scars, or lesions suggestive of malignancy. No petechiae, purpura, or ecchymoses. No excoriations. Neurologic No sensory or motor deficits, normal cerebellar function, normal gait, cranial nerves intact. Psychiatric Alert and oriented times three. Coherent speech. Verbalizes understanding of our discussions today.

## 2018-07-11 NOTE — IP AVS SNAPSHOT
[address] [facility] 
[phone] Patient: Howard Alvarenga 
MRN: [id number] NNV:[id number] A check bettie indicates which time of day the medication should be taken. My Medications START taking these medications Instructions Each Dose to Equal  
 Morning Noon Evening Bedtime  
 docusate sodium 100 mg capsule Commonly known as:  Danya Torres Your last dose was: Your next dose is: Take 1 Cap by mouth two (2) times a day for 90 days. 100 mg  
    
   
   
   
  
 glipiZIDE 10 mg tablet Commonly known as:  Beny Varela Your last dose was: Your next dose is: Take 1 Tab by mouth Before breakfast and dinner. 10 mg  
    
   
   
   
  
 megestrol 400 mg/10 mL (10 mL) suspension Commonly known as:  MEGACE Your last dose was: Your next dose is: Take 5 mL by mouth daily. 200 mg  
    
   
   
   
  
 therapeutic multivitamin tablet Commonly known as:  [facility] Your last dose was: Your next dose is: Take 1 Tab by mouth daily. 1 Tab CHANGE how you take these medications Instructions Each Dose to Equal  
 Morning Noon Evening Bedtime CeleXA 40 mg tablet Generic drug:  citalopram  
What changed:  Another medication with the same name was removed. Continue taking this medication, and follow the directions you see here. Your last dose was: Your next dose is: Take 40 mg by mouth daily. 40 mg  
    
   
   
   
  
 lisinopril 20 mg tablet Commonly known as:  Candace Ontiveros What changed:  Another medication with the same name was removed. Continue taking this medication, and follow the directions you see here. Your last dose was: Your next dose is: Take 20 mg by mouth two (2) times a day.   
 20 mg  
    
   
   
   
  
  
 CONTINUE taking these medications Instructions Each Dose to Equal  
 Morning Noon Evening Bedtime  
 albuterol 90 mcg/actuation inhaler Commonly known as:  PROVENTIL HFA, VENTOLIN HFA, PROAIR HFA Your last dose was: Your next dose is: Take 2 Puffs by inhalation every four (4) hours as needed for Wheezing. 2 Puff  
    
   
   
   
  
 chlorpheniramine-HYDROcodone 10-8 mg/5 mL suspension Commonly known as:  Tia Robe ER Your last dose was: Your next dose is: Take 5 mL by mouth every twelve (12) hours as needed for Cough. Max Daily Amount: 10 mL. 5 mL  
    
   
   
   
  
 divalproex  mg tablet Commonly known as:  DEPAKOTE Your last dose was: Your next dose is: Take 1 tablet by mouth two (2) times a day. 500 mg FLOMAX 0.4 mg capsule Generic drug:  tamsulosin Your last dose was: Your next dose is: Take 0.4 mg by mouth daily. 0.4 mg  
    
   
   
   
  
 FLONASE ALLERGY RELIEF 50 mcg/actuation nasal spray Generic drug:  fluticasone Your last dose was: Your next dose is: 2 Sprays by Both Nostrils route daily as needed for Rhinitis. 2 Spray  
    
   
   
   
  
 metFORMIN 500 mg tablet Commonly known as:  GLUCOPHAGE Your last dose was: Your next dose is: Take 500 mg by mouth two (2) times daily (with meals). 500 mg  
    
   
   
   
  
 multivitamin tablet Commonly known as:  ONE A DAY Your last dose was: Your next dose is: Take 1 Tab by mouth daily. 1 Tab NITROSTAT 0.4 mg SL tablet Generic drug:  nitroglycerin Your last dose was: Your next dose is: 0.4 mg by SubLINGual route every five (5) minutes as needed for Chest Pain. Up to 3 doses. 0.4 mg  
    
   
   
   
  
 oxybutynin 5 mg tablet Commonly known as:  DMCMVMMO Your last dose was: Your next dose is: Take 5 mg by mouth nightly. 5 mg  
    
   
   
   
  
 oxyCODONE-acetaminophen 5-325 mg per tablet Commonly known as:  PERCOCET Your last dose was: Your next dose is: Take 1 Tab by mouth every four (4) hours as needed for Pain. Max Daily Amount: 6 Tabs. 1 Tab PROTONIX 40 mg tablet Generic drug:  pantoprazole Your last dose was: Your next dose is: Take 40 mg by mouth daily. 40 mg  
    
   
   
   
  
 VIAGRA 100 mg tablet Generic drug:  sildenafil citrate Your last dose was: Your next dose is: Take 100 mg by mouth as needed (\"To set the mood\"). 100 mg  
    
   
   
   
  
 XANAX 1 mg tablet Generic drug:  ALPRAZolam  
   
Your last dose was: Your next dose is: Take 1 mg by mouth four (4) times daily. 1 mg XARELTO 20 mg Tab tablet Generic drug:  rivaroxaban Your last dose was: Your next dose is: Take 20 mg by mouth daily. 20 mg Where to Get Your Medications Information on where to get these meds will be given to you by the nurse or doctor. ! Ask your nurse or doctor about these medications  
  docusate sodium 100 mg capsule  
 glipiZIDE 10 mg tablet  
 megestrol 400 mg/10 mL (10 mL) suspension  
 oxyCODONE-acetaminophen 5-325 mg per tablet  
 therapeutic multivitamin tablet

## 2018-07-11 NOTE — ED NOTES
Patient complaints of lack of appetite to couple of months with abdominal and back pain for that time. Today also complaints of headache. Medicated for headache as ordered. Also states he has lost 100 pounds in the past year without trying. Provider notified of patient's symptoms and weight loss.

## 2018-07-11 NOTE — ED NOTES
Pt c/o pain in chest for months, pain in abdomen for months, numbness on left side of body for months, history of Afib, has not had an appetite for months. Ran out of o2, on O2 all the time. spo2 97% RA. Pt reporting \"there has been no change in pain, I just couldn't take it anymore\".  Reports seeing pcp for pain and prescribe \"something\" that has not worked

## 2018-07-11 NOTE — ED NOTES
No family in town. Son and daughter and siblings all live out of state. Has lost his mother and father in the past year. Tearful at this time due to new diagnosis of cancer. Offered  support. Instructed patient to let us know if he has any additional needs. Patient to be admitted for additional workup. Pending admission evaluation by hospitalist at this time.

## 2018-07-12 ENCOUNTER — ANESTHESIA (OUTPATIENT)
Dept: ENDOSCOPY | Age: 63
DRG: 436 | End: 2018-07-12
Payer: MEDICARE

## 2018-07-12 ENCOUNTER — APPOINTMENT (OUTPATIENT)
Dept: CT IMAGING | Age: 63
DRG: 436 | End: 2018-07-12
Attending: INTERNAL MEDICINE
Payer: MEDICARE

## 2018-07-12 ENCOUNTER — ANESTHESIA EVENT (OUTPATIENT)
Dept: ENDOSCOPY | Age: 63
DRG: 436 | End: 2018-07-12
Payer: MEDICARE

## 2018-07-12 LAB
ALBUMIN SERPL-MCNC: 2.3 G/DL (ref 3.5–5)
ALBUMIN/GLOB SERPL: 0.5 {RATIO} (ref 1.1–2.2)
ALP SERPL-CCNC: 192 U/L (ref 45–117)
ALT SERPL-CCNC: 17 U/L (ref 12–78)
ANION GAP SERPL CALC-SCNC: 8 MMOL/L (ref 5–15)
AST SERPL-CCNC: 19 U/L (ref 15–37)
BASOPHILS # BLD: 0 K/UL (ref 0–0.1)
BASOPHILS NFR BLD: 1 % (ref 0–1)
BILIRUB SERPL-MCNC: 0.3 MG/DL (ref 0.2–1)
BUN SERPL-MCNC: 10 MG/DL (ref 6–20)
BUN/CREAT SERPL: 12 (ref 12–20)
CALCIUM SERPL-MCNC: 8.3 MG/DL (ref 8.5–10.1)
CHLORIDE SERPL-SCNC: 100 MMOL/L (ref 97–108)
CO2 SERPL-SCNC: 26 MMOL/L (ref 21–32)
CREAT SERPL-MCNC: 0.83 MG/DL (ref 0.7–1.3)
DIFFERENTIAL METHOD BLD: ABNORMAL
EOSINOPHIL # BLD: 0.1 K/UL (ref 0–0.4)
EOSINOPHIL NFR BLD: 2 % (ref 0–7)
ERYTHROCYTE [DISTWIDTH] IN BLOOD BY AUTOMATED COUNT: 14.6 % (ref 11.5–14.5)
EST. AVERAGE GLUCOSE BLD GHB EST-MCNC: 298 MG/DL
GLOBULIN SER CALC-MCNC: 4.3 G/DL (ref 2–4)
GLUCOSE BLD STRIP.AUTO-MCNC: 173 MG/DL (ref 65–100)
GLUCOSE BLD STRIP.AUTO-MCNC: 181 MG/DL (ref 65–100)
GLUCOSE BLD STRIP.AUTO-MCNC: 193 MG/DL (ref 65–100)
GLUCOSE BLD STRIP.AUTO-MCNC: 361 MG/DL (ref 65–100)
GLUCOSE SERPL-MCNC: 249 MG/DL (ref 65–100)
HBA1C MFR BLD: 12 % (ref 4.2–6.3)
HCT VFR BLD AUTO: 37.9 % (ref 36.6–50.3)
HGB BLD-MCNC: 12.2 G/DL (ref 12.1–17)
IMM GRANULOCYTES # BLD: 0 K/UL (ref 0–0.04)
IMM GRANULOCYTES NFR BLD AUTO: 0 % (ref 0–0.5)
LYMPHOCYTES # BLD: 1.4 K/UL (ref 0.8–3.5)
LYMPHOCYTES NFR BLD: 34 % (ref 12–49)
MAGNESIUM SERPL-MCNC: 1.9 MG/DL (ref 1.6–2.4)
MCH RBC QN AUTO: 22.9 PG (ref 26–34)
MCHC RBC AUTO-ENTMCNC: 32.2 G/DL (ref 30–36.5)
MCV RBC AUTO: 71.1 FL (ref 80–99)
MONOCYTES # BLD: 0.3 K/UL (ref 0–1)
MONOCYTES NFR BLD: 7 % (ref 5–13)
NEUTS SEG # BLD: 2.2 K/UL (ref 1.8–8)
NEUTS SEG NFR BLD: 55 % (ref 32–75)
NRBC # BLD: 0 K/UL (ref 0–0.01)
NRBC BLD-RTO: 0 PER 100 WBC
PHOSPHATE SERPL-MCNC: 2.9 MG/DL (ref 2.6–4.7)
PLATELET # BLD AUTO: 194 K/UL (ref 150–400)
PMV BLD AUTO: ABNORMAL FL (ref 8.9–12.9)
POTASSIUM SERPL-SCNC: 3.6 MMOL/L (ref 3.5–5.1)
PROT SERPL-MCNC: 6.6 G/DL (ref 6.4–8.2)
RBC # BLD AUTO: 5.33 M/UL (ref 4.1–5.7)
SERVICE CMNT-IMP: ABNORMAL
SODIUM SERPL-SCNC: 134 MMOL/L (ref 136–145)
WBC # BLD AUTO: 4 K/UL (ref 4.1–11.1)

## 2018-07-12 PROCEDURE — 74011636320 HC RX REV CODE- 636/320: Performed by: INTERNAL MEDICINE

## 2018-07-12 PROCEDURE — 88305 TISSUE EXAM BY PATHOLOGIST: CPT | Performed by: HOSPITALIST

## 2018-07-12 PROCEDURE — 74011250636 HC RX REV CODE- 250/636: Performed by: INTERNAL MEDICINE

## 2018-07-12 PROCEDURE — 74011250636 HC RX REV CODE- 250/636

## 2018-07-12 PROCEDURE — 88172 CYTP DX EVAL FNA 1ST EA SITE: CPT | Performed by: HOSPITALIST

## 2018-07-12 PROCEDURE — 83036 HEMOGLOBIN GLYCOSYLATED A1C: CPT | Performed by: HOSPITALIST

## 2018-07-12 PROCEDURE — 36415 COLL VENOUS BLD VENIPUNCTURE: CPT | Performed by: HOSPITALIST

## 2018-07-12 PROCEDURE — 76040000008: Performed by: INTERNAL MEDICINE

## 2018-07-12 PROCEDURE — 74011250636 HC RX REV CODE- 250/636: Performed by: HOSPITALIST

## 2018-07-12 PROCEDURE — 77030019957 HC CUF BLN GASTSCP OCOA -B: Performed by: INTERNAL MEDICINE

## 2018-07-12 PROCEDURE — 76060000033 HC ANESTHESIA 1 TO 1.5 HR: Performed by: INTERNAL MEDICINE

## 2018-07-12 PROCEDURE — 77030003406 HC NDL ASPIR BIOP OCOA -C: Performed by: INTERNAL MEDICINE

## 2018-07-12 PROCEDURE — 74011250637 HC RX REV CODE- 250/637: Performed by: INTERNAL MEDICINE

## 2018-07-12 PROCEDURE — 80053 COMPREHEN METABOLIC PANEL: CPT | Performed by: HOSPITALIST

## 2018-07-12 PROCEDURE — 82962 GLUCOSE BLOOD TEST: CPT

## 2018-07-12 PROCEDURE — 0FBG8ZX EXCISION OF PANCREAS, VIA NATURAL OR ARTIFICIAL OPENING ENDOSCOPIC, DIAGNOSTIC: ICD-10-PCS | Performed by: INTERNAL MEDICINE

## 2018-07-12 PROCEDURE — 85025 COMPLETE CBC W/AUTO DIFF WBC: CPT | Performed by: HOSPITALIST

## 2018-07-12 PROCEDURE — 65660000000 HC RM CCU STEPDOWN

## 2018-07-12 PROCEDURE — 74011250637 HC RX REV CODE- 250/637: Performed by: HOSPITALIST

## 2018-07-12 PROCEDURE — 88173 CYTOPATH EVAL FNA REPORT: CPT | Performed by: HOSPITALIST

## 2018-07-12 PROCEDURE — 83735 ASSAY OF MAGNESIUM: CPT | Performed by: HOSPITALIST

## 2018-07-12 PROCEDURE — 84100 ASSAY OF PHOSPHORUS: CPT | Performed by: HOSPITALIST

## 2018-07-12 PROCEDURE — 71260 CT THORAX DX C+: CPT

## 2018-07-12 PROCEDURE — 86301 IMMUNOASSAY TUMOR CA 19-9: CPT | Performed by: INTERNAL MEDICINE

## 2018-07-12 PROCEDURE — 74011000250 HC RX REV CODE- 250

## 2018-07-12 RX ORDER — GLIPIZIDE 5 MG/1
10 TABLET ORAL
Status: DISCONTINUED | OUTPATIENT
Start: 2018-07-12 | End: 2018-07-13 | Stop reason: HOSPADM

## 2018-07-12 RX ORDER — FENTANYL CITRATE 50 UG/ML
25-100 INJECTION, SOLUTION INTRAMUSCULAR; INTRAVENOUS
Status: DISCONTINUED | OUTPATIENT
Start: 2018-07-12 | End: 2018-07-12 | Stop reason: HOSPADM

## 2018-07-12 RX ORDER — NALOXONE HYDROCHLORIDE 0.4 MG/ML
0.4 INJECTION, SOLUTION INTRAMUSCULAR; INTRAVENOUS; SUBCUTANEOUS
Status: DISCONTINUED | OUTPATIENT
Start: 2018-07-12 | End: 2018-07-12 | Stop reason: HOSPADM

## 2018-07-12 RX ORDER — THERA TABS 400 MCG
1 TAB ORAL DAILY
Qty: 30 TAB | Refills: 0 | Status: SHIPPED | OUTPATIENT
Start: 2018-07-13 | End: 2018-07-20

## 2018-07-12 RX ORDER — SODIUM CHLORIDE 0.9 % (FLUSH) 0.9 %
5-10 SYRINGE (ML) INJECTION AS NEEDED
Status: ACTIVE | OUTPATIENT
Start: 2018-07-12 | End: 2018-07-12

## 2018-07-12 RX ORDER — SODIUM CHLORIDE 9 MG/ML
75 INJECTION, SOLUTION INTRAVENOUS CONTINUOUS
Status: DISCONTINUED | OUTPATIENT
Start: 2018-07-12 | End: 2018-07-12 | Stop reason: HOSPADM

## 2018-07-12 RX ORDER — DOCUSATE SODIUM 100 MG/1
100 CAPSULE, LIQUID FILLED ORAL 2 TIMES DAILY
Qty: 60 CAP | Refills: 0 | Status: SHIPPED | OUTPATIENT
Start: 2018-07-12 | End: 2018-10-10

## 2018-07-12 RX ORDER — ATROPINE SULFATE 0.1 MG/ML
0.5 INJECTION INTRAVENOUS
Status: DISCONTINUED | OUTPATIENT
Start: 2018-07-12 | End: 2018-07-12 | Stop reason: HOSPADM

## 2018-07-12 RX ORDER — EPINEPHRINE 0.1 MG/ML
1 INJECTION INTRACARDIAC; INTRAVENOUS
Status: DISCONTINUED | OUTPATIENT
Start: 2018-07-12 | End: 2018-07-12 | Stop reason: HOSPADM

## 2018-07-12 RX ORDER — DEXTROMETHORPHAN/PSEUDOEPHED 2.5-7.5/.8
1.2 DROPS ORAL
Status: DISCONTINUED | OUTPATIENT
Start: 2018-07-12 | End: 2018-07-12 | Stop reason: HOSPADM

## 2018-07-12 RX ORDER — SODIUM CHLORIDE 9 MG/ML
50 INJECTION, SOLUTION INTRAVENOUS
Status: COMPLETED | OUTPATIENT
Start: 2018-07-12 | End: 2018-07-12

## 2018-07-12 RX ORDER — PROPOFOL 10 MG/ML
VIAL (ML) INTRAVENOUS
Status: DISCONTINUED | OUTPATIENT
Start: 2018-07-12 | End: 2018-07-12 | Stop reason: HOSPADM

## 2018-07-12 RX ORDER — MEGESTROL ACETATE 40 MG/ML
200 SUSPENSION ORAL DAILY
Qty: 1 BOTTLE | Refills: 0 | Status: SHIPPED | OUTPATIENT
Start: 2018-07-13

## 2018-07-12 RX ORDER — OXYCODONE AND ACETAMINOPHEN 5; 325 MG/1; MG/1
1 TABLET ORAL
Qty: 15 TAB | Refills: 0 | Status: SHIPPED | OUTPATIENT
Start: 2018-07-12

## 2018-07-12 RX ORDER — FLUMAZENIL 0.1 MG/ML
0.2 INJECTION INTRAVENOUS
Status: DISCONTINUED | OUTPATIENT
Start: 2018-07-12 | End: 2018-07-12 | Stop reason: HOSPADM

## 2018-07-12 RX ORDER — SODIUM CHLORIDE 0.9 % (FLUSH) 0.9 %
10 SYRINGE (ML) INJECTION
Status: COMPLETED | OUTPATIENT
Start: 2018-07-12 | End: 2018-07-12

## 2018-07-12 RX ORDER — SODIUM CHLORIDE 0.9 % (FLUSH) 0.9 %
5-10 SYRINGE (ML) INJECTION EVERY 8 HOURS
Status: COMPLETED | OUTPATIENT
Start: 2018-07-12 | End: 2018-07-12

## 2018-07-12 RX ORDER — PROPOFOL 10 MG/ML
INJECTION, EMULSION INTRAVENOUS AS NEEDED
Status: DISCONTINUED | OUTPATIENT
Start: 2018-07-12 | End: 2018-07-12 | Stop reason: HOSPADM

## 2018-07-12 RX ORDER — OXYCODONE AND ACETAMINOPHEN 5; 325 MG/1; MG/1
1 TABLET ORAL
Status: DISCONTINUED | OUTPATIENT
Start: 2018-07-12 | End: 2018-07-13 | Stop reason: HOSPADM

## 2018-07-12 RX ORDER — LIDOCAINE HYDROCHLORIDE 20 MG/ML
INJECTION, SOLUTION EPIDURAL; INFILTRATION; INTRACAUDAL; PERINEURAL AS NEEDED
Status: DISCONTINUED | OUTPATIENT
Start: 2018-07-12 | End: 2018-07-12 | Stop reason: HOSPADM

## 2018-07-12 RX ADMIN — Medication 10 ML: at 09:36

## 2018-07-12 RX ADMIN — OXYCODONE HYDROCHLORIDE AND ACETAMINOPHEN 1 TABLET: 5; 325 TABLET ORAL at 21:43

## 2018-07-12 RX ADMIN — PROPOFOL 90 MG: 10 INJECTION, EMULSION INTRAVENOUS at 12:08

## 2018-07-12 RX ADMIN — Medication 10 ML: at 14:00

## 2018-07-12 RX ADMIN — SODIUM CHLORIDE 75 ML/HR: 900 INJECTION, SOLUTION INTRAVENOUS at 11:54

## 2018-07-12 RX ADMIN — Medication 10 MCG/KG/MIN: at 12:08

## 2018-07-12 RX ADMIN — PROPOFOL 50 MG: 10 INJECTION, EMULSION INTRAVENOUS at 12:15

## 2018-07-12 RX ADMIN — Medication 10 ML: at 06:08

## 2018-07-12 RX ADMIN — FENTANYL CITRATE 25 MCG: 50 INJECTION, SOLUTION INTRAMUSCULAR; INTRAVENOUS at 13:53

## 2018-07-12 RX ADMIN — MEGESTROL ACETATE 200 MG: 40 SUSPENSION ORAL at 09:00

## 2018-07-12 RX ADMIN — CITALOPRAM HYDROBROMIDE 40 MG: 20 TABLET ORAL at 15:32

## 2018-07-12 RX ADMIN — IOPAMIDOL 80 ML: 755 INJECTION, SOLUTION INTRAVENOUS at 16:22

## 2018-07-12 RX ADMIN — PROPOFOL 50 MG: 10 INJECTION, EMULSION INTRAVENOUS at 12:35

## 2018-07-12 RX ADMIN — PROPOFOL 50 MG: 10 INJECTION, EMULSION INTRAVENOUS at 12:30

## 2018-07-12 RX ADMIN — Medication 10 ML: at 21:43

## 2018-07-12 RX ADMIN — HYDROMORPHONE HYDROCHLORIDE 0.2 MG: 2 INJECTION, SOLUTION INTRAMUSCULAR; INTRAVENOUS; SUBCUTANEOUS at 09:36

## 2018-07-12 RX ADMIN — Medication 10 ML: at 16:22

## 2018-07-12 RX ADMIN — PROPOFOL 50 MG: 10 INJECTION, EMULSION INTRAVENOUS at 12:44

## 2018-07-12 RX ADMIN — SODIUM CHLORIDE 50 ML/HR: 900 INJECTION, SOLUTION INTRAVENOUS at 16:22

## 2018-07-12 RX ADMIN — LISINOPRIL 20 MG: 20 TABLET ORAL at 15:32

## 2018-07-12 RX ADMIN — DIVALPROEX SODIUM 500 MG: 250 TABLET, DELAYED RELEASE ORAL at 15:32

## 2018-07-12 RX ADMIN — OXYBUTYNIN CHLORIDE 5 MG: 5 TABLET ORAL at 15:32

## 2018-07-12 RX ADMIN — LIDOCAINE HYDROCHLORIDE 100 MG: 20 INJECTION, SOLUTION EPIDURAL; INFILTRATION; INTRACAUDAL; PERINEURAL at 12:08

## 2018-07-12 RX ADMIN — TAMSULOSIN HYDROCHLORIDE 0.4 MG: 0.4 CAPSULE ORAL at 15:32

## 2018-07-12 RX ADMIN — LIDOCAINE HYDROCHLORIDE 100 MG: 20 INJECTION, SOLUTION EPIDURAL; INFILTRATION; INTRACAUDAL; PERINEURAL at 12:06

## 2018-07-12 RX ADMIN — GLIPIZIDE 10 MG: 5 TABLET ORAL at 18:04

## 2018-07-12 RX ADMIN — THERA TABS 1 TABLET: TAB at 15:32

## 2018-07-12 RX ADMIN — HYDROMORPHONE HYDROCHLORIDE 0.2 MG: 2 INJECTION, SOLUTION INTRAMUSCULAR; INTRAVENOUS; SUBCUTANEOUS at 01:52

## 2018-07-12 NOTE — PROGRESS NOTES
Pt blood sugar 361. Pt to receive slide and scale humalog for coverage. Pt stating he does not want to take insulin as he is only on metformin at home and really feels a type of way against taking insulin. Explained to patient that with his blood sugar that high, it could cause issues to microvessels in his body (eyes, kidneys, etc) and that his body might be stressed from being in the hospital making his sugar spike. Pt verbalized understanding. Pt refused lunch time insulin as well. 17:00- Told pt that MD was potentially going to d/c him with insulin at home. Pt stated there was no way he could give himself insulin injections and he really needed to speak with a  ([facility], case management attempted to see patient today but he was off the floor for multiple tests today). Asked Dr. Derick Courtney to call pt per pt request.     MD notified of pt's noncompliance. No new orders received.

## 2018-07-12 NOTE — PROGRESS NOTES
Initial Nutrition Assessment:    INTERVENTIONS/RECOMMENDATIONS:   · Meals/Snacks: General/healthful diet: Continue consistent carbohydrate diet   · Supplements: Commercial supplement: Gelatein TID and magic cups BID    ASSESSMENT:   Patient medically noted for suspected pancreatic cancer with liver mets s/p EUS and biopsy. PMH for DM and depression. Patient initially NPO this morning at time of visit but diet has now been advanced to [facility]. Patient has had significant weight loss of 27% over the past 6 months. He contributes his weight loss to depression and occasional abdominal pain when eating. He reports drinking a lot of fluids and eating fruit, grits, and oatmeal. He tries to use protein powder. Unfortunately, patient has not tolerated ONS such as ensure d/t milk intolerance but reports tolerating small amounts of yogurt recently. Discussed supplements available; he has tried ensure clear in the past when his mother received it and did not care for it. Discussed magic cup as patient reported liking sorbet/sherbet but warned patient it contained milk but he would like to try it anyway. Also discussed gelatein protein supplement and patient agreeable to trying this as well; reports liking jello very much. Menu provided to patient and explained room service. With significant weight loss, inadequate intake, and muscle/fat loss (orbitals, temples), patient meets criteria for chronic severe malnutrition.      Meets Criteria for Chronic Malnutrition   [x] Severe Malnutrition, as evidenced by:   [] Severe muscle wasting, loss of subcutaneous fat   [x] Nutritional intake of <75% of recommended intake for >1 month   [x] Weight loss of  >5% in 1 month, >7.5% in 3 months, >10% in 6 months, >20% in 1 year   [] Severe edema   []Moderate Malnutrition, as evidenced by:   [x] Mild muscle wasting, loss of subcutaneous fat   [] Nutritional intake <75% of recommended intake for >1 month   [] Weight loss of  5% in 1 month, 7.5% in 3 months, 10% in 6 months, or 20% in 1 year   [] Mild edema        Diet Order: Consistent carb  % Eaten:  No data found. Pertinent Medications: [x]Reviewed []Other: Celexa, colace, humalog, lisinopril, Megace, protonix, MVI  Pertinent Labs: [x]Reviewed []Other:  [phone]  Food Allergies: [x]None []Other   Last BM: 7/10   []Active     []Hyperactive  []Hypoactive       [] Absent BS  Skin:    [x] Intact   [] Incision  [] Breakdown: [] Edema []Other:    Anthropometrics:   Height: 5' 7\" (170.2 cm) Weight: 104.5 kg (230 lb 6.1 oz)   IBW (%IBW):   ( ) UBW (%UBW):   (  %)   Last Weight Metrics:  Weight Loss Metrics 7/11/2018 2/21/2018 2/10/2018 1/25/2018 1/12/2018 1/12/2018 9/27/2017   Today's Wt 230 lb 6.1 oz 294 lb 307 lb 15.7 oz 316 lb 12.8 oz 319 lb 14.2 oz 315 lb 334 lb   BMI 36.08 kg/m2 46.05 kg/m2 48.24 kg/m2 49.62 kg/m2 50.1 kg/m2 49.34 kg/m2 52.31 kg/m2       BMI: Body mass index is 36.08 kg/(m^2). This BMI is indicative of:   []Underweight    []Normal    []Overweight    [x] Obesity   [] Extreme Obesity (BMI>40)     Estimated Nutrition Needs (Based on):   2364 Kcals/day (BMR (1819) x 1. 3AF) , 105 g (1.0 g/kg bw) Protein  Carbohydrate:  At Least 130 g/day  Fluids: 2000 mL/day    Pt expected to meet estimated nutrient needs: [x]Yes []No    NUTRITION DIAGNOSES:   Problem:  Malnutrition      Etiology: related to abdominal pain, decreased appetite, pancreatic mass     Signs/Symptoms: as evidenced by 27% weight loss x 6 months, PO <50% of estimated needs      NUTRITION INTERVENTIONS:  Meals/Snacks: General/healthful diet   Supplements: Commercial supplement              GOAL:   PO intake >50% of meals and 70% of ONS next 2-4 days    LEARNING NEEDS (Diet, Food/Nutrient-Drug Interaction):    [x] None Identified   [] Identified and Education Provided/Documented   [] Identified and Pt declined/was not appropriate     Cultural, Orthodoxy, OR Ethnic Dietary Needs:    [x] None Identified   [] Identified and Addressed     [x] Interdisciplinary Care Plan Reviewed/Documented    [x] Discharge Planning: Consistent carbohydrate diet + preferred ONS 2-3x/day; small frequent meals as tolerated       MONITORING /EVALUATION:   Food/Nutrient Intake Outcomes:  Total energy intake  Physical Signs/Symptoms Outcomes: Weight/weight change, Electrolyte and renal profile, Glucose profile, GI profile    NUTRITION RISK:    [x] High              [] Moderate           []  Low  []  Minimal/Uncompromised    PT SEEN FOR:    [x]  MD Consult: []Calorie Count      []Diabetic Diet Education        []Diet Education     []Electrolyte Management     [x]General Nutrition Management and Supplements     []Management of Tube Feeding     []TPN Recommendations    [x]  RN Referral:  [x]MST score >=2     []Enteral/Parenteral Nutrition PTA     []Pregnant: Gestational DM or Multigestation     []Pressure Ulcer/Wound Care needs        []  Low BMI  []  RADHA Medina  Pager [phone]                 Weekend Pager [phone]

## 2018-07-12 NOTE — PERIOP NOTES
TRANSFER - IN REPORT:    Verbal report received from Erasmo Houston RN(name) on [facility]  being received from [facility](unit) for routine progression of care      Report consisted of patients Situation, Background, Assessment and   Recommendations(SBAR). Information from the following report(s) SBAR was reviewed with the receiving nurse. Opportunity for questions and clarification was provided. Assessment completed upon patients arrival to unit and care assumed.

## 2018-07-12 NOTE — PROGRESS NOTES
Physical Therapy  Patient is off of the floor presently. Will eval later today as tolerated.   Thanks for referral.

## 2018-07-12 NOTE — ANESTHESIA PREPROCEDURE EVALUATION
Anesthetic History   No history of anesthetic complications            Review of Systems / Medical History  Patient summary reviewed, nursing notes reviewed and pertinent labs reviewed    Pulmonary        Sleep apnea           Neuro/Psych     seizures  CVA  TIA and psychiatric history     Cardiovascular    Hypertension        Dysrhythmias   CAD    Exercise tolerance: >4 METS  Comments: EF 55-60%   GI/Hepatic/Renal     GERD           Endo/Other    Diabetes    Obesity     Other Findings   Comments: Pancreatic mass         Physical Exam    Airway  Mallampati: II  TM Distance: 4 - 6 cm  Neck ROM: normal range of motion   Mouth opening: Normal     Cardiovascular  Regular rate and rhythm,  S1 and S2 normal,  no murmur, click, rub, or gallop             Dental  No notable dental hx       Pulmonary  Breath sounds clear to auscultation               Abdominal  GI exam deferred       Other Findings            Anesthetic Plan    ASA: 3  Anesthesia type: general and total IV anesthesia          Induction: Intravenous  Anesthetic plan and risks discussed with: Patient

## 2018-07-12 NOTE — PROGRESS NOTES
Hematology Oncology Progress Note           Follow up for: New Pancreatic Mass    Chart notes reviewed since last visit. Case discussed with following:     Patient complains of the following: Additional concerns noted by the staff:     Patient Vitals for the past 24 hrs:   BP Temp Pulse Resp SpO2   07/12/18 1355 163/85 - 92 13 96 %   07/12/18 1352 177/85 - 89 - 96 %   07/12/18 1346 154/82 - 94 12 96 %   07/12/18 1341 164/90 - 88 - 95 %   07/12/18 1336 (!) 148/98 - 93 10 97 %   07/12/18 1331 144/54 - 97 27 96 %   07/12/18 1326 (!) 140/99 - 98 16 93 %   07/12/18 1311 113/85 - 94 20 97 %   07/12/18 1305 98/68 - 90 30 92 %   07/12/18 1138 138/86 98 °F (36.7 °C) 83 26 98 %   07/12/18 0851 (!) 161/96 98.1 °F (36.7 °C) 90 16 97 %   07/12/18 0152 (!) 140/94 97.9 °F (36.6 °C) 94 16 96 %   07/11/18 2313 143/89 97.8 °F (36.6 °C) 77 18 92 %   07/11/18 1934 (!) 151/91 98.1 °F (36.7 °C) 79 18 96 %   07/11/18 1600 135/84 - - - -   07/11/18 1430 (!) 138/102 97.8 °F (36.6 °C) 90 16 98 %   07/11/18 1410 140/82 98.3 °F (36.8 °C) 84 16 96 %       Review of Systems:  Constitutional No fevers, chills, night sweats, excessive fatigue. + unintentional weight loss. Allergic/Immunologic No recent allergic reactions   Eyes No significant visual difficulties. No diplopia. ENMT No problems with hearing, no sore throat, no sinus drainage. Endocrine No hot flashes or night sweats. No cold intolerance, polyuria, or polydipsia   Hematologic/Lymphatic No easy bruising or bleeding. The patient denies any tender or palpable lymph nodes   Breasts No abnormal masses of breast   Respiratory No dyspnea on exertion, orthopnea, chest pain, cough or hemoptysis. Cardiovascular No anginal chest pain, + irregular heart beat. No tachycardia, palpitations or orthopnea. Gastrointestinal + nausea, NO vomiting, diarrhea, constipation, dysphagia. + reflux, heartburn, dark stools.       Genitourinary (M) No hematuria, dysuria, increased frequency, urgency, hesitancy or incontinence. Musculoskeletal No joint pain, swelling or redness. No decreased range of motion. Integumentary No chronic rashes, inflammation, ulcerations, pruritus, petechiae, purpura, ecchymoses, or skin changes. Neurologic No headache, blurred vision, and no areas of focal weakness or numbness. Normal gait. No sensory problems. Psychiatric No insomnia, depression, dasia or mood swings. No psychotropic drugs.         Physical Exam:  Constitutional Alert, cooperative, oriented. Mood and affect appropriate. overweight with loose skin under arms apparent. Head Normocephalic; no scars   Eyes Conjunctivae and sclerae are clear and without icterus. Pupils are reactive and equal.   ENMT Sinuses are nontender. No oral exudates, ulcers, masses, thrush or mucositis. Oropharynx clear. Tongue normal.   Neck Supple without masses or thyromegaly. No jugular venous distension. Hematologic/Lymphatic No petechiae or purpura. No tender or palpable lymph nodes in the cervical, supraclavicular, axillary or inguinal area. Respiratory Lungs are clear to auscultation without rhonchi or wheezing. Cardiovascular Irregularly irregular with VALERIE IV/VI RUSB   Chest / Line Site Chest is symmetric with no chest wall deformities. Abdomen +BS, TTP epigastric region, no rebound. Musculoskeletal No tenderness or swelling, normal range of motion without obvious weakness. Extremities No visible deformities, no cyanosis, clubbing or edema. Skin No rashes, scars, or lesions suggestive of malignancy. No petechiae, purpura, or ecchymoses. No excoriations. Neurologic No sensory or motor deficits, normal cerebellar function, normal gait, cranial nerves intact. Psychiatric Alert and oriented times three. Coherent speech.  Verbalizes understanding of our discussions today.          Labs:  Recent Results (from the past 24 hour(s))   GLUCOSE, POC    Collection Time: 07/11/18  4:37 PM   Result Value Ref Range    Glucose (POC) 304 (H) 65 - 100 mg/dL    Performed by Steven Dominguez    GLUCOSE, POC    Collection Time: 07/11/18 10:01 PM   Result Value Ref Range    Glucose (POC) 289 (H) 65 - 100 mg/dL    Performed by MEEK BONNER    CBC WITH AUTOMATED DIFF    Collection Time: 07/12/18  1:55 AM   Result Value Ref Range    WBC 4.0 (L) 4.1 - 11.1 K/uL    RBC 5.33 4.10 - 5.70 M/uL    HGB 12.2 12.1 - 17.0 g/dL    HCT 37.9 36.6 - 50.3 %    MCV 71.1 (L) 80.0 - 99.0 FL    MCH 22.9 (L) 26.0 - 34.0 PG    MCHC 32.2 30.0 - 36.5 g/dL    RDW 14.6 (H) 11.5 - 14.5 %    PLATELET 670 357 - 844 K/uL    MPV ABNORMAL 8.9 - 12.9 FL    NRBC 0.0 0  WBC    ABSOLUTE NRBC 0.00 0.00 - 0.01 K/uL    NEUTROPHILS 55 32 - 75 %    LYMPHOCYTES 34 12 - 49 %    MONOCYTES 7 5 - 13 %    EOSINOPHILS 2 0 - 7 %    BASOPHILS 1 0 - 1 %    IMMATURE GRANULOCYTES 0 0.0 - 0.5 %    ABS. NEUTROPHILS 2.2 1.8 - 8.0 K/UL    ABS. LYMPHOCYTES 1.4 0.8 - 3.5 K/UL    ABS. MONOCYTES 0.3 0.0 - 1.0 K/UL    ABS. EOSINOPHILS 0.1 0.0 - 0.4 K/UL    ABS. BASOPHILS 0.0 0.0 - 0.1 K/UL    ABS. IMM. GRANS. 0.0 0.00 - 0.04 K/UL    DF AUTOMATED     MAGNESIUM    Collection Time: 07/12/18  1:55 AM   Result Value Ref Range    Magnesium 1.9 1.6 - 2.4 mg/dL   PHOSPHORUS    Collection Time: 07/12/18  1:55 AM   Result Value Ref Range    Phosphorus 2.9 2.6 - 4.7 MG/DL   METABOLIC PANEL, COMPREHENSIVE    Collection Time: 07/12/18  1:55 AM   Result Value Ref Range    Sodium 134 (L) 136 - 145 mmol/L    Potassium 3.6 3.5 - 5.1 mmol/L    Chloride 100 97 - 108 mmol/L    CO2 26 21 - 32 mmol/L    Anion gap 8 5 - 15 mmol/L    Glucose 249 (H) 65 - 100 mg/dL    BUN 10 6 - 20 MG/DL    Creatinine 0.83 0.70 - 1.30 MG/DL    BUN/Creatinine ratio 12 12 - 20      GFR est AA >60 >60 ml/min/1.73m2    GFR est non-AA >60 >60 ml/min/1.73m2    Calcium 8.3 (L) 8.5 - 10.1 MG/DL    Bilirubin, total 0.3 0.2 - 1.0 MG/DL    ALT (SGPT) 17 12 - 78 U/L    AST (SGOT) 19 15 - 37 U/L    Alk.  phosphatase 192 (H) 45 - 117 U/L Protein, total 6.6 6.4 - 8.2 g/dL    Albumin 2.3 (L) 3.5 - 5.0 g/dL    Globulin 4.3 (H) 2.0 - 4.0 g/dL    A-G Ratio 0.5 (L) 1.1 - 2.2     HEMOGLOBIN A1C WITH EAG    Collection Time: 18  1:55 AM   Result Value Ref Range    Hemoglobin A1c 12.0 (H) 4.2 - 6.3 %    Est. average glucose 298 mg/dL   GLUCOSE, POC    Collection Time: 18  7:35 AM   Result Value Ref Range    Glucose (POC) 361 (H) 65 - 100 mg/dL    Performed by Sierra, POC    Collection Time: 18 11:01 AM   Result Value Ref Range    Glucose (POC) 193 (H) 65 - 100 mg/dL    Performed by Ashley Jorge        Imagin18 CT A/P:  IMPRESSION:  Pancreatic body mass with assoc. Numerous hepatic masses compatible with  pancreatic carcinoma and metastatic disease      Assessment and Plan:   Mr Christian Hart is a 64 yo male admitted after he was found to have a newly discovered pancreatic body mass and liver lesions:     *) Pancreatic body mass:  - CT A/P shows 5cm pancreatic body mass concerning for primary pancreatic carcinoma  - CA 19-9 pending  - GI performed EUS/FNA today, f/u on results. - Will need bx results prior to making final tx plan  - CT Chest ordered to complete staging.  - Port ordered in hopes IR can place in AM. Pt agreeable. - He will f/u with [facility], Dr. Kierra Cotton per his request since this was his wife's Oncologist. Discussed with him if this is Pancreatic adenocarcinoma it is not curable and has a poor prognosis (he asked prognostic questions) usually ~6 months.     *) Liver lesions:  - Most likely due to metastatic disease from presumed pancreatic primary malignancy. Will await bx of pancreatic mass. This will likely stage him as stage IV and carries a poor prognosis.    *) Severe wt loss:  - Nutrition consult placed  - Encourage supplementation with Ensure. Certainly related to his underlying malignancy.      *) Paroxysmal A Fib:  - Has been on Xarelto, now on hold for biopsy and port placement.     *) UDS +Cocaine/marijuana use:  - Denies recent cocain use, this will certainly BE A PROBLEM  If he continues this while on treatment.

## 2018-07-12 NOTE — PROCEDURES
NAME:  Mitch Gautam II   :   1955   MRN:   [id number]     [facility]    Date/Time:  2018   Procedure Type: Linear EUS    Indications: Pancreatic mass    Pre-operative Diagnosis: see indication above    Post-operative Diagnosis:  See findings below    : Zoila Ornelas MD    Referring Provider: -Katty Dailey MD-Caroline Godoy MD    Procedure Details:    Exam:  Airway: clear, no airway problems anticipated  Heart: RRR, without gallops or rubs  Lungs: clear bilaterally without wheezes, crackles, or rhonchi  Abdomen: soft, nontender, nondistended, bowel sounds present  Mental Status: awake, alert and oriented to person, place and time     Anethesia/Sedation:  MAC anesthesia Propofol      Procedure Details   After infom consent was obtained for the procedure, with all risks and benefits of procedure explained the patient was taken to the endoscopy suite and placed in the left lateral decubitus position. Following sequential administration of sedation as per above, the linear echoendoscope was inserted into the mouth and advanced under direct vision to jejunum  A careful inspection was made as the gastroscope was withdrawn, findings and interventions are described below. Findings:     Endoscopic:  1. Normal proximal and mid esophagus  2. Small sliding hiatal hernia  3. 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 Normal jejunal limb    Ultrasound:   Pancreas:     Jhon en Y bypass anatomy made it difficult to visualize the pancreas. An ill defined, hypoechoic mass measuring approximate 4.5 cm was seen in the gastric body. Complications: None. EBL:  None. Interventions: Fine needle aspirate was performed of the pancreas  using a 22 gauge needle with 3 of passes with preliminary results suggesting malignancy. Recommendations:   1. Hold Xarelto until   2. Oncology following  3.  Follow up final cytology    Néstor Garcia MD

## 2018-07-12 NOTE — DIABETES MGMT
[facility] Elevated A1C/Progress Note Recommendations/ Comments: Noted pt has been refusing all correction doses and will only take oral DM meds per nursing note. Pt currently off floor, DTC to f/u when pt available to discuss elevated A1c. Current hospital meds: 
-Humalog insulin resistant correction Patient is a 63 y.o. male with known history of Type 2 Diabetes on Metformin 500mg bid at home. A1c:  
Lab Results Component Value Date/Time Hemoglobin A1c 12.0 (H) 07/12/2018 01:55 AM  
 
 
Recent Glucose Results:  
Lab Results Component Value Date/Time  (H) 07/12/2018 01:55 AM  
 GLUCPOC 193 (H) 07/12/2018 11:01 AM  
 GLUCPOC 361 (H) 07/12/2018 07:35 AM  
 GLUCPOC 289 (H) 07/11/2018 10:01 PM  
  
 
Lab Results Component Value Date/Time Creatinine 0.83 07/12/2018 01:55 AM  
 
 
Active Orders Diet DIET NPO Will continue to follow as needed. Thank you. Nevaeh He RD, CDE [facility] Office: [phone]

## 2018-07-12 NOTE — PERIOP NOTES
Patient states that his stomach pain is a 8/10. The same pain that he came in with. Dr Pascual Garcia notified and assessed patient. Stomach soft.  Pain medication on MAR to be given     1350 Verbal order for 25 mcg of fentanyl obtained from Dr Pascual Garcia  Medication to be given in recovery

## 2018-07-12 NOTE — PROGRESS NOTES
Pt requesting nikolay to be changed. Received order to discontinue nikolay and start percocet 5-325 q6 PRN.

## 2018-07-12 NOTE — ANESTHESIA POSTPROCEDURE EVALUATION
Post-Anesthesia Evaluation and Assessment    Patient: Neli Liz MRN: [id number]  SSN: [id number]    YOB: 1955  Age: 63 y.o. Sex: male       Cardiovascular Function/Vital Signs  Visit Vitals    BP (!) 104/91    Pulse 93    Temp 36.7 °C (98 °F)    Resp 15    Ht 5' 7\" (1.702 m)    Wt 104.5 kg (230 lb 6.1 oz)    SpO2 96%    BMI 36.08 kg/m2       Patient is status post general, total IV anesthesia anesthesia for Procedure(s):  ENDOSCOPIC ULTRASOUND (EUS)  ESOPHAGOGASTRODUODENOSCOPY (EGD)  FINE NEEDLE ASPIRATION. Nausea/Vomiting: None    Postoperative hydration reviewed and adequate. Pain:  Pain Scale 1: Numeric (0 - 10) (07/12/18 1400)  Pain Intensity 1: 6 (07/12/18 1400)   Managed    Neurological Status:   Neuro  Neurologic State: Alert (07/12/18 0755)  Orientation Level: Oriented X4 (07/12/18 0755)  Cognition: Follows commands; Appropriate decision making; Appropriate for age attention/concentration; Appropriate safety awareness (07/12/18 0755)  Speech: Clear (07/12/18 0755)  Assessment L Pupil: Round (07/11/18 1116)  Size L Pupil (mm): 3 (07/11/18 1116)  Assessment R Pupil: Round (07/11/18 1116)  Size R Pupil (mm): 3 (07/11/18 1116)  LUE Motor Response: Purposeful (07/11/18 1600)  LLE Motor Response: Purposeful (07/11/18 1600)  RUE Motor Response: Purposeful (07/11/18 1600)  RLE Motor Response: Purposeful (07/11/18 1600)   At baseline    Mental Status and Level of Consciousness: Arousable    Pulmonary Status:   O2 Device: Room air (07/12/18 1400)   Adequate oxygenation and airway patent    Complications related to anesthesia: None    Post-anesthesia assessment completed.  No concerns    Signed By: Adriana Garcia MD     July 12, 2018

## 2018-07-12 NOTE — PERIOP NOTES
Fine needle aspiration passes x 3 taken by Dr Patricia Morley and transferred by [facility] AND [facility] ET to Rylan Tam pathology and read by JOSE ENRIQUE goddard'Evangelina

## 2018-07-12 NOTE — PERIOP NOTES
Endoscope was pre-cleaned at the bedside immediately following procedure by [facility] AND [facility].

## 2018-07-12 NOTE — PERIOP NOTES
Endoscope was pre-cleaned at the bedside immediately following procedure by [facility] AND [facility].

## 2018-07-12 NOTE — PROGRESS NOTES
Bedside shift change report given to Shelly Mcneill (oncoming nurse) by [place] (offgoing nurse). Report included the following information Kardex, SBAR, Recent Results, MAR. Pt had biopsy today. Possible d/c tomorrow.

## 2018-07-12 NOTE — PERIOP NOTES
Anesthesia reports 778mg Propofol, 100mg Lidocaine and 600mL NS given during procedure. Received report from anesthesia staff on vital signs and status of patient.

## 2018-07-12 NOTE — PROGRESS NOTES
CM attempted to see Patient as requested, Patient currently off the floor for procedure. CM will continue to follow/address d/c planning needs.      Al Cuello

## 2018-07-12 NOTE — PROGRESS NOTES
Occupational Therapy  OT consult received, chart reviewed. Patient is currently off the floor and unavailable for OT evaluation. Will defer for now but continue to follow.
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
## 2018-07-12 NOTE — DISCHARGE INSTRUCTIONS
HOSPITALIST DISCHARGE INSTRUCTIONS    NAME: Armida Sauer II   :  1955   MRN:  [id number]     Date/Time:  2018 4:41 PM    ADMIT DATE: 2018   DISCHARGE DATE: 2018         · It is important that you take the medication exactly as they are prescribed. · Keep your medication in the bottles provided by the pharmacist and keep a list of the medication names, dosages, and times to be taken in your wallet. · Do not take other medications without consulting your doctor. What to do at Home    Recommended diet:  Diabetic Diet consistent carbohydrate diet     Recommended activity: Activity as tolerated      If you have questions regarding the hospital related prescriptions or hospital related issues please call [facility] at . You can always direct your questions to your primary care doctor if you are unable to reach your hospital physician; your PCP works as an extension of your hospital doctor just like your hospital doctor is an extension of your PCP for your time at the hospital [facility])    If you experience any of the following symptoms then please call your primary care physician or return to the emergency room if you cannot get hold of your doctor:    Fever, chills, nausea, vomiting, or persistent diarrhea  Worsening weakness or new problems with your speech or balance  Dark stools or visible blood in your stools  New Leg swelling or shortness of breath as this could be signs of a clot    Additional Instructions:      Bring these papers with you to your follow up appointments. The papers will help your doctors be sure to continue the care plan from the hospital.      INSTRUCTIONS:  Hold Xarelto until 18  Hold Metfomin until 18        Information obtained by :  I understand that if any problems occur once I am at home I am to contact my physician. I understand and acknowledge receipt of the instructions indicated above. Physician's or R.N.'s Signature                                                                  Date/Time                                                                                                                                              Patient or Representative Signature                                          Learning About ACE Inhibitors and ARBs for Diabetes  Introduction    ACE inhibitors and ARBs are medicines used to control blood pressure. They allow blood vessels to relax and open up. This lowers your blood pressure. When you have diabetes, taking an ACE inhibitor or ARB can help to:  · Treat high blood pressure. Your risk of problems from diabetes goes up when you have high blood pressure. · Prevent or slow kidney damage. Diabetes can damage the blood vessels in the kidneys. High blood pressure can damage the kidneys, too. · Lower the risks of stroke and heart attack. Your risks go up when you have high blood pressure, heart disease, or both. An ACE inhibitor or ARB is a good choice for people with diabetes. Unlike some medicines, these don't affect blood sugar levels. Examples  ACE inhibitors include:  · Benazepril. · Lisinopril. · Ramipril. ARBs include:  · Irbesartan. · Losartan. · Telmisartan. Possible side effects  All medicines can cause side effects. Some side effects of ACE inhibitors include:  · Low blood pressure. You may feel dizzy and weak. · A cough. · High potassium levels. · An allergic reaction of the skin. Symptoms may range from mild swelling to painful welts. Some side effects of ARBs include:  · Diarrhea. · High potassium levels. · Sinus problems. · Stomach problems. You may have other side effects or reactions not listed here. Check the information that comes with your medicine.   What to know about taking this medicine  · Be safe with medicines. Take your medicines exactly as prescribed. Call your doctor if you think you are having a problem with your medicine. · Before starting an ACE inhibitor or ARB, tell your doctor if you:  ¨ Use a salt substitute. ¨ Take diuretics or potassium tablets. · These medicines are not safe for pregnancy. If you are pregnant or planning to be, talk to your doctor about a safe blood pressure medicine. · ACE inhibitors can cause a dry cough. If the cough is bad, talk to your doctor. Switching to an ARB is likely to help. · Taking some medicines together can cause problems. Tell your doctor or pharmacist all the medicines you take. This includes over-the-counter medicines, vitamins, herbal products, and supplements. · You may need regular blood and urine tests. Where can you learn more? Go to http://dean-sergio.info/. Enter M316 in the search box to learn more about \"Learning About ACE Inhibitors and ARBs for Diabetes. \"  Current as of: December 7, 2017  Content Version: 11.7  © 2742-9067 [employer]. Care instructions adapted under license by [employer] (which disclaims liability or warranty for this information). If you have questions about a medical condition or this instruction, always ask your healthcare professional. [address] any warranty or liability for your use of this information.

## 2018-07-12 NOTE — PROGRESS NOTES
Bedside shift change report given to [place] (oncoming nurse) by Lupe Parker (offgoing nurse). Report included the following information SBAR, Kardex, Intake/Output, MAR and Recent Results. Zone Phone for oncoming shift:   [phone]    Shift Summary: Pt rested quietly through night. Issues with abd pain, partly relieved by medication. LDAs               Peripheral IV 07/11/18 Left Antecubital (Active)   Site Assessment Clean, dry, & intact 7/12/2018  7:55 AM   Phlebitis Assessment 0 7/12/2018  7:55 AM   Infiltration Assessment 0 7/12/2018  7:55 AM   Dressing Status Clean, dry, & intact 7/12/2018  7:55 AM   Dressing Type Transparent;Tape 7/12/2018  7:55 AM   Hub Color/Line Status Pink; Infusing 7/12/2018  7:55 AM   Action Taken Blood drawn 7/11/2018  4:39 AM                        Intake & Output   Date 07/11/18 0700 - 07/12/18 0659 07/12/18 0700 - 07/13/18 0659   Shift 8995-8562 8167-6983 24 Hour Total 7553-9399 0090-6957 24 Hour Total   I  N  T  A  K  E   P.O.  480 480         P. O.  480 480       I.V.  (mL/kg/hr)  900  (0.7) 900  (0.4)         I.V.  900 900       Shift Total  (mL/kg)  1380  (13.2) 1380  (13.2)      O  U  T  P  U  T   Urine  (mL/kg/hr)  100  (0.1) 100  (0)         Urine Voided  100 100         Urine Occurrence(s) 1 x 1 x 2 x       Shift Total  (mL/kg)  100  (1) 100  (1)      NET  1280 1280      Weight (kg) 104.5 104.5 104.5 104.5 104.5 104.5      Last Bowel Movement Last Bowel Movement Date: 07/10/18   Glucose Checks [] N/A  [x] AC/HS  [] Q6  Concerns:   Nutrition Active Orders   Diet    DIET NPO       Consults [x]PT  [x]OT  []Speech  [x]Case Management   Cardiac Monitoring []N/A [x]Yes Expires:48 hrs

## 2018-07-13 VITALS
RESPIRATION RATE: 18 BRPM | HEIGHT: 67 IN | SYSTOLIC BLOOD PRESSURE: 172 MMHG | TEMPERATURE: 97.6 F | BODY MASS INDEX: 36.16 KG/M2 | OXYGEN SATURATION: 99 % | DIASTOLIC BLOOD PRESSURE: 98 MMHG | HEART RATE: 98 BPM | WEIGHT: 230.38 LBS

## 2018-07-13 LAB
ANION GAP SERPL CALC-SCNC: 8 MMOL/L (ref 5–15)
BASOPHILS # BLD: 0 K/UL (ref 0–0.1)
BASOPHILS NFR BLD: 0 % (ref 0–1)
BUN SERPL-MCNC: 8 MG/DL (ref 6–20)
BUN/CREAT SERPL: 11 (ref 12–20)
CALCIUM SERPL-MCNC: 8.6 MG/DL (ref 8.5–10.1)
CANCER AG19-9 SERPL-ACNC: 1 U/ML (ref 0–35)
CHLORIDE SERPL-SCNC: 103 MMOL/L (ref 97–108)
CO2 SERPL-SCNC: 27 MMOL/L (ref 21–32)
CREAT SERPL-MCNC: 0.74 MG/DL (ref 0.7–1.3)
DIFFERENTIAL METHOD BLD: ABNORMAL
EOSINOPHIL # BLD: 0.1 K/UL (ref 0–0.4)
EOSINOPHIL NFR BLD: 1 % (ref 0–7)
ERYTHROCYTE [DISTWIDTH] IN BLOOD BY AUTOMATED COUNT: 14.6 % (ref 11.5–14.5)
GLUCOSE BLD STRIP.AUTO-MCNC: 136 MG/DL (ref 65–100)
GLUCOSE SERPL-MCNC: 108 MG/DL (ref 65–100)
HCT VFR BLD AUTO: 37.8 % (ref 36.6–50.3)
HGB BLD-MCNC: 12.2 G/DL (ref 12.1–17)
IMM GRANULOCYTES # BLD: 0 K/UL (ref 0–0.04)
IMM GRANULOCYTES NFR BLD AUTO: 0 % (ref 0–0.5)
LYMPHOCYTES # BLD: 1.3 K/UL (ref 0.8–3.5)
LYMPHOCYTES NFR BLD: 22 % (ref 12–49)
MCH RBC QN AUTO: 22.9 PG (ref 26–34)
MCHC RBC AUTO-ENTMCNC: 32.3 G/DL (ref 30–36.5)
MCV RBC AUTO: 71.1 FL (ref 80–99)
MONOCYTES # BLD: 0.4 K/UL (ref 0–1)
MONOCYTES NFR BLD: 7 % (ref 5–13)
NEUTS SEG # BLD: 4.2 K/UL (ref 1.8–8)
NEUTS SEG NFR BLD: 69 % (ref 32–75)
NRBC # BLD: 0 K/UL (ref 0–0.01)
NRBC BLD-RTO: 0 PER 100 WBC
PLATELET # BLD AUTO: 210 K/UL (ref 150–400)
POTASSIUM SERPL-SCNC: 3.5 MMOL/L (ref 3.5–5.1)
RBC # BLD AUTO: 5.32 M/UL (ref 4.1–5.7)
SERVICE CMNT-IMP: ABNORMAL
SODIUM SERPL-SCNC: 138 MMOL/L (ref 136–145)
WBC # BLD AUTO: 6 K/UL (ref 4.1–11.1)

## 2018-07-13 PROCEDURE — 74011250637 HC RX REV CODE- 250/637: Performed by: HOSPITALIST

## 2018-07-13 PROCEDURE — 82962 GLUCOSE BLOOD TEST: CPT

## 2018-07-13 PROCEDURE — 36415 COLL VENOUS BLD VENIPUNCTURE: CPT | Performed by: INTERNAL MEDICINE

## 2018-07-13 PROCEDURE — 74011250637 HC RX REV CODE- 250/637: Performed by: INTERNAL MEDICINE

## 2018-07-13 PROCEDURE — 80048 BASIC METABOLIC PNL TOTAL CA: CPT | Performed by: INTERNAL MEDICINE

## 2018-07-13 PROCEDURE — 85025 COMPLETE CBC W/AUTO DIFF WBC: CPT | Performed by: INTERNAL MEDICINE

## 2018-07-13 RX ORDER — GLIPIZIDE 10 MG/1
10 TABLET ORAL
Qty: 60 TAB | Refills: 0 | Status: SHIPPED | OUTPATIENT
Start: 2018-07-13

## 2018-07-13 RX ADMIN — GLIPIZIDE 10 MG: 5 TABLET ORAL at 08:00

## 2018-07-13 RX ADMIN — DIVALPROEX SODIUM 250 MG: 250 TABLET, DELAYED RELEASE ORAL at 08:02

## 2018-07-13 RX ADMIN — LISINOPRIL 20 MG: 20 TABLET ORAL at 08:02

## 2018-07-13 RX ADMIN — Medication 10 ML: at 05:22

## 2018-07-13 RX ADMIN — PANTOPRAZOLE SODIUM 40 MG: 40 TABLET, DELAYED RELEASE ORAL at 08:00

## 2018-07-13 RX ADMIN — OXYBUTYNIN CHLORIDE 5 MG: 5 TABLET ORAL at 08:08

## 2018-07-13 NOTE — DIABETES MGMT
DTC Consult Note Recommendations/ Comments:  Attempted to complete consult with pt today for assessment of home management and insulin instruction. Pt noted prior to attempted visit to be refusing his insulin throughout his admission. Will reach out to patient for questions by phone. Valentine Alvares RD CDE [facility] [phone]
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
## 2018-07-13 NOTE — PROGRESS NOTES
Bedside shift change report given to Fran Rausch (oncoming nurse) by Roberta Barnes (offgoing nurse). Report included the following information SBAR, Kardex, Intake/Output, MAR and Recent Results. Zone Phone for oncoming shift:   [phone]    Shift Summary: Pt still with diffuse abd pain. Pt removed telemetry and refused to wear any longer. LDAs               Peripheral IV 07/11/18 Left Antecubital (Active)   Site Assessment Clean, dry, & intact 7/13/2018  4:41 AM   Phlebitis Assessment 0 7/13/2018  4:41 AM   Infiltration Assessment 0 7/13/2018  4:41 AM   Dressing Status Clean, dry, & intact 7/13/2018  4:41 AM   Dressing Type Transparent;Tape 7/13/2018  4:41 AM   Hub Color/Line Status Pink;Flushed 7/13/2018  4:41 AM   Action Taken Blood drawn 7/11/2018  4:39 AM                        Intake & Output   Date 07/12/18 0700 - 07/13/18 0659 07/13/18 0700 - 07/14/18 0659   Shift 7272-1487 4458-4250 24 Hour Total 0743-1561 2907-5214 24 Hour Total   I  N  T  A  K  E   P.O. 120 480 600         P. O. 120 480 600       I.V.  (mL/kg/hr) 600  (0.5)  600         Volume (0.9% sodium chloride infusion) 600  600       Shift Total  (mL/kg) 720  (6.9) 480  (4.6) 1200  (11.5)      O  U  T  P  U  T   Urine  (mL/kg/hr)            Urine Occurrence(s)  2 x 2 x       Shift Total  (mL/kg)          509 5681      Weight (kg) 104.5 104.5 104.5 104.5 104.5 104.5      Last Bowel Movement Last Bowel Movement Date: 07/10/18   Glucose Checks [] N/A  [x] AC/HS  [] Q6  Concerns:   Nutrition Active Orders   Diet    DIET DIABETIC CONSISTENT CARB Regular       Consults []PT  []OT  []Speech  [x]Case Management   Cardiac Monitoring []N/A [x]Yes Expires:48 hrs

## 2018-07-13 NOTE — PROGRESS NOTES
GI NOTE    Still with significant pain, but the pain meds do help. Final pancreatic biopsy pending, but certainly appears to be adenocarcinoma    He wants to go home which I think is fine. Needs appointment with oncology ASAP    I will contact him next week with biopsy results. A celiac plexus block in the future could be done if pain not controlled with po meds.

## 2018-07-13 NOTE — PROGRESS NOTES
Pt given discharge instructions, hard scripts for medications (given to pt by MD), new medication education, follow up information (Dr. Carlos Dang and US at [facility]), and instructed to hold xarelto and metformin. All questions answered. IV taken out. Pt to drive himself home.

## 2018-07-13 NOTE — PROGRESS NOTES
PCP TAJ appt scheduled with Dr. Carlos Dang on 7/16/2018 office will call with  time.  Appt added to AVS. Roman Solis CM Specialist
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
## 2018-07-16 ENCOUNTER — HOSPITAL ENCOUNTER (OUTPATIENT)
Dept: ULTRASOUND IMAGING | Age: 63
Discharge: HOME | End: 2018-07-16
Payer: MEDICARE

## 2018-07-16 DIAGNOSIS — E04.2 NONTOXIC MULTINODULAR GOITER: ICD-10-CM

## 2018-07-16 PROCEDURE — 76536 US EXAM OF HEAD AND NECK: CPT

## 2018-07-20 NOTE — PERIOP NOTES
Left message with Dr. Maria Luisa Figueroa office concerning xarelto management (last dose 07/10/18).

## 2018-07-23 ENCOUNTER — HOSPITAL ENCOUNTER (OUTPATIENT)
Age: 63
Setting detail: OUTPATIENT SURGERY
Discharge: HOME | End: 2018-07-23
Attending: INTERNAL MEDICINE | Admitting: INTERNAL MEDICINE
Payer: MEDICARE

## 2018-07-23 ENCOUNTER — ANESTHESIA EVENT (OUTPATIENT)
Dept: ENDOSCOPY | Age: 63
End: 2018-07-23
Payer: MEDICARE

## 2018-07-23 ENCOUNTER — ANESTHESIA (OUTPATIENT)
Dept: ENDOSCOPY | Age: 63
End: 2018-07-23
Payer: MEDICARE

## 2018-07-23 VITALS
SYSTOLIC BLOOD PRESSURE: 142 MMHG | OXYGEN SATURATION: 94 % | TEMPERATURE: 98.6 F | HEART RATE: 102 BPM | RESPIRATION RATE: 17 BRPM | BODY MASS INDEX: 35.22 KG/M2 | DIASTOLIC BLOOD PRESSURE: 81 MMHG | WEIGHT: 224.38 LBS | HEIGHT: 67 IN

## 2018-07-23 LAB
GLUCOSE BLD STRIP.AUTO-MCNC: 156 MG/DL (ref 65–100)
SERVICE CMNT-IMP: ABNORMAL

## 2018-07-23 PROCEDURE — 74011000250 HC RX REV CODE- 250: Performed by: INTERNAL MEDICINE

## 2018-07-23 PROCEDURE — 76040000019: Performed by: INTERNAL MEDICINE

## 2018-07-23 PROCEDURE — 77030019957 HC CUF BLN GASTSCP OCOA -B: Performed by: INTERNAL MEDICINE

## 2018-07-23 PROCEDURE — 77030022853 HC NDL ASPIR ULTRSND BSC -C: Performed by: INTERNAL MEDICINE

## 2018-07-23 PROCEDURE — 76060000031 HC ANESTHESIA FIRST 0.5 HR: Performed by: INTERNAL MEDICINE

## 2018-07-23 PROCEDURE — 74011250636 HC RX REV CODE- 250/636

## 2018-07-23 PROCEDURE — 74011250636 HC RX REV CODE- 250/636: Performed by: INTERNAL MEDICINE

## 2018-07-23 PROCEDURE — 82962 GLUCOSE BLOOD TEST: CPT

## 2018-07-23 PROCEDURE — 74011000250 HC RX REV CODE- 250

## 2018-07-23 RX ORDER — LIDOCAINE HYDROCHLORIDE 20 MG/ML
INJECTION, SOLUTION EPIDURAL; INFILTRATION; INTRACAUDAL; PERINEURAL AS NEEDED
Status: DISCONTINUED | OUTPATIENT
Start: 2018-07-23 | End: 2018-07-23 | Stop reason: HOSPADM

## 2018-07-23 RX ORDER — FENTANYL CITRATE 50 UG/ML
INJECTION, SOLUTION INTRAMUSCULAR; INTRAVENOUS AS NEEDED
Status: DISCONTINUED | OUTPATIENT
Start: 2018-07-23 | End: 2018-07-23 | Stop reason: HOSPADM

## 2018-07-23 RX ORDER — MIDAZOLAM HYDROCHLORIDE 1 MG/ML
1-2 INJECTION, SOLUTION INTRAMUSCULAR; INTRAVENOUS
Status: DISCONTINUED | OUTPATIENT
Start: 2018-07-23 | End: 2018-07-23 | Stop reason: HOSPADM

## 2018-07-23 RX ORDER — DEXTROMETHORPHAN/PSEUDOEPHED 2.5-7.5/.8
1.2 DROPS ORAL
Status: DISCONTINUED | OUTPATIENT
Start: 2018-07-23 | End: 2018-07-23 | Stop reason: HOSPADM

## 2018-07-23 RX ORDER — SODIUM CHLORIDE 9 MG/ML
1000 INJECTION, SOLUTION INTRAVENOUS CONTINUOUS
Status: DISCONTINUED | OUTPATIENT
Start: 2018-07-23 | End: 2018-07-23 | Stop reason: HOSPADM

## 2018-07-23 RX ORDER — SODIUM CHLORIDE 9 MG/ML
75 INJECTION, SOLUTION INTRAVENOUS CONTINUOUS
Status: DISCONTINUED | OUTPATIENT
Start: 2018-07-23 | End: 2018-07-23 | Stop reason: HOSPADM

## 2018-07-23 RX ORDER — ATROPINE SULFATE 0.1 MG/ML
0.5 INJECTION INTRAVENOUS
Status: DISCONTINUED | OUTPATIENT
Start: 2018-07-23 | End: 2018-07-23 | Stop reason: HOSPADM

## 2018-07-23 RX ORDER — ONDANSETRON 2 MG/ML
INJECTION INTRAMUSCULAR; INTRAVENOUS
Status: DISCONTINUED
Start: 2018-07-23 | End: 2018-07-23 | Stop reason: HOSPADM

## 2018-07-23 RX ORDER — MIDAZOLAM HYDROCHLORIDE 1 MG/ML
INJECTION, SOLUTION INTRAMUSCULAR; INTRAVENOUS
Status: COMPLETED
Start: 2018-07-23 | End: 2018-07-23

## 2018-07-23 RX ORDER — PROPOFOL 10 MG/ML
INJECTION, EMULSION INTRAVENOUS
Status: DISCONTINUED | OUTPATIENT
Start: 2018-07-23 | End: 2018-07-23 | Stop reason: HOSPADM

## 2018-07-23 RX ORDER — SODIUM CHLORIDE 0.9 % (FLUSH) 0.9 %
5-10 SYRINGE (ML) INJECTION AS NEEDED
Status: DISCONTINUED | OUTPATIENT
Start: 2018-07-23 | End: 2018-07-23 | Stop reason: HOSPADM

## 2018-07-23 RX ORDER — KETAMINE HYDROCHLORIDE 10 MG/ML
INJECTION, SOLUTION INTRAMUSCULAR; INTRAVENOUS AS NEEDED
Status: DISCONTINUED | OUTPATIENT
Start: 2018-07-23 | End: 2018-07-23 | Stop reason: HOSPADM

## 2018-07-23 RX ORDER — SODIUM CHLORIDE 0.9 % (FLUSH) 0.9 %
5-10 SYRINGE (ML) INJECTION EVERY 8 HOURS
Status: DISCONTINUED | OUTPATIENT
Start: 2018-07-23 | End: 2018-07-23 | Stop reason: HOSPADM

## 2018-07-23 RX ORDER — NALOXONE HYDROCHLORIDE 0.4 MG/ML
0.4 INJECTION, SOLUTION INTRAMUSCULAR; INTRAVENOUS; SUBCUTANEOUS
Status: DISCONTINUED | OUTPATIENT
Start: 2018-07-23 | End: 2018-07-23 | Stop reason: HOSPADM

## 2018-07-23 RX ORDER — EPINEPHRINE 0.1 MG/ML
1 INJECTION INTRACARDIAC; INTRAVENOUS
Status: DISCONTINUED | OUTPATIENT
Start: 2018-07-23 | End: 2018-07-23 | Stop reason: HOSPADM

## 2018-07-23 RX ORDER — ONDANSETRON 2 MG/ML
4-8 INJECTION INTRAMUSCULAR; INTRAVENOUS ONCE
Status: COMPLETED | OUTPATIENT
Start: 2018-07-23 | End: 2018-07-23

## 2018-07-23 RX ORDER — KETAMINE HYDROCHLORIDE 50 MG/ML
INJECTION, SOLUTION INTRAMUSCULAR; INTRAVENOUS
Status: DISCONTINUED
Start: 2018-07-23 | End: 2018-07-23 | Stop reason: HOSPADM

## 2018-07-23 RX ORDER — PROPOFOL 10 MG/ML
INJECTION, EMULSION INTRAVENOUS AS NEEDED
Status: DISCONTINUED | OUTPATIENT
Start: 2018-07-23 | End: 2018-07-23 | Stop reason: HOSPADM

## 2018-07-23 RX ORDER — FENTANYL CITRATE 50 UG/ML
INJECTION, SOLUTION INTRAMUSCULAR; INTRAVENOUS
Status: COMPLETED
Start: 2018-07-23 | End: 2018-07-23

## 2018-07-23 RX ORDER — BUPIVACAINE HYDROCHLORIDE 2.5 MG/ML
10 INJECTION, SOLUTION EPIDURAL; INFILTRATION; INTRACAUDAL ONCE
Status: COMPLETED | OUTPATIENT
Start: 2018-07-23 | End: 2018-07-23

## 2018-07-23 RX ORDER — MIDAZOLAM HYDROCHLORIDE 1 MG/ML
INJECTION, SOLUTION INTRAMUSCULAR; INTRAVENOUS AS NEEDED
Status: DISCONTINUED | OUTPATIENT
Start: 2018-07-23 | End: 2018-07-23 | Stop reason: HOSPADM

## 2018-07-23 RX ORDER — GLYCOPYRROLATE 0.2 MG/ML
INJECTION INTRAMUSCULAR; INTRAVENOUS AS NEEDED
Status: DISCONTINUED | OUTPATIENT
Start: 2018-07-23 | End: 2018-07-23 | Stop reason: HOSPADM

## 2018-07-23 RX ORDER — FENTANYL CITRATE 50 UG/ML
25 INJECTION, SOLUTION INTRAMUSCULAR; INTRAVENOUS
Status: DISCONTINUED | OUTPATIENT
Start: 2018-07-23 | End: 2018-07-23 | Stop reason: HOSPADM

## 2018-07-23 RX ORDER — FLUMAZENIL 0.1 MG/ML
0.2 INJECTION INTRAVENOUS
Status: DISCONTINUED | OUTPATIENT
Start: 2018-07-23 | End: 2018-07-23 | Stop reason: HOSPADM

## 2018-07-23 RX ADMIN — ALCOHOL 10 ML: 0.98 INJECTION INTRASPINAL at 12:18

## 2018-07-23 RX ADMIN — KETAMINE HYDROCHLORIDE 20 MG: 10 INJECTION, SOLUTION INTRAMUSCULAR; INTRAVENOUS at 12:08

## 2018-07-23 RX ADMIN — LIDOCAINE HYDROCHLORIDE 100 MG: 20 INJECTION, SOLUTION EPIDURAL; INFILTRATION; INTRACAUDAL; PERINEURAL at 12:08

## 2018-07-23 RX ADMIN — SODIUM CHLORIDE 1000 ML: 900 INJECTION, SOLUTION INTRAVENOUS at 11:00

## 2018-07-23 RX ADMIN — GLYCOPYRROLATE 0.1 MG: 0.2 INJECTION INTRAMUSCULAR; INTRAVENOUS at 12:08

## 2018-07-23 RX ADMIN — FENTANYL CITRATE 50 MCG: 50 INJECTION, SOLUTION INTRAMUSCULAR; INTRAVENOUS at 12:07

## 2018-07-23 RX ADMIN — SODIUM CHLORIDE: 900 INJECTION, SOLUTION INTRAVENOUS at 12:01

## 2018-07-23 RX ADMIN — PROPOFOL 50 MCG/KG/MIN: 10 INJECTION, EMULSION INTRAVENOUS at 12:10

## 2018-07-23 RX ADMIN — PROPOFOL 20 MG: 10 INJECTION, EMULSION INTRAVENOUS at 12:10

## 2018-07-23 RX ADMIN — MIDAZOLAM HYDROCHLORIDE 2 MG: 1 INJECTION, SOLUTION INTRAMUSCULAR; INTRAVENOUS at 12:07

## 2018-07-23 RX ADMIN — ONDANSETRON 8 MG: 2 INJECTION, SOLUTION INTRAMUSCULAR; INTRAVENOUS at 13:36

## 2018-07-23 RX ADMIN — BUPIVACAINE HYDROCHLORIDE 25 MG: 2.5 INJECTION, SOLUTION EPIDURAL; INFILTRATION; INTRACAUDAL; PERINEURAL at 12:18

## 2018-07-23 NOTE — DISCHARGE INSTRUCTIONS
Priya Jewels II  [id number]  1955    EUS DISCHARGE INSTRUCTIONS  Discomfort:  Sore throat- throat lozenges or warm salt water gargle  redness at IV site- apply warm compress to area; if redness or soreness persist- contact your physician  Gaseous discomfort- walking, belching will help relieve any discomfort  You may not operate a vehicle for 12 hours  You may not engage in an occupation involving machinery or appliances for rest of today  You may not drink alcoholic beverages for at least 12 hours  Avoid making any critical decisions for at least 24 hour  DIET  You may resume your regular diet - however -  remember your colon is empty and a heavy meal will produce gas. Avoid these foods:  vegetables, fried / greasy foods, carbonated drinks    MEDICATIONS:  Per Medication Reconciliation  If pt needs to go back on Xarelto can resume on 7/26 from GI standpoint    ACTIVITY  You may resume your normal daily activities until tomorrow AM;  Spend the remainder of the day resting -  avoid any strenuous activity. CALL M.D. ANY SIGN OF   Increasing pain, nausea, vomiting  Abdominal distension (swelling)  New increased bleeding (oral or rectal)  Fever (chills)  Pain in chest area  Bloody discharge from nose or mouth  Shortness of breath    You may not take any Advil, Aspirin, Ibuprofen, Motrin, Aleve, or Goodys for 10 days, ONLY  Tylenol as needed for pain. IMPRESSION:  Findings:     Ultrasound:  Under EUS guidance the celiac trunk was identified branching off the aorta. A 22 gauge needle was primed with 5 cc of saline. The needle was then advanced anterior to the celiac trunk in the region of the celiac plexus. 3 cc of of normal saline was used to then flush the needle. An aspiration test was performed and this was negative (ruled out vessel penetration). Subsequently 10 cc of 0.25% Bupivacaine was injected followed by 10 cc of 98% alcohol.  The needle was then flushed with an additional 3 cc of normal saline prior to withdrawing the needle. Recommendations:  1.  Follow up with Oncology as planned      Follow-up Instructions:   Telephone # [phone]    Genny Rivera MD

## 2018-07-23 NOTE — IP AVS SNAPSHOT
[address] [facility] 
[phone] Patient: Damien Carson 
MRN: [id number] PWP:[id number] About your hospitalization You were admitted on:  July 23, 2018 You last received care in the:  \[facility]\"" ENDOSCOPY You were discharged on:  July 23, 2018 Why you were hospitalized Your primary diagnosis was:  Not on File Follow-up Information Follow up With Details Comments Contact Info Aleah Conde MD   [address] [place] [address] [address] 
[phone] Discharge Orders None A check bettie indicates which time of day the medication should be taken. My Medications CONTINUE taking these medications Instructions Each Dose to Equal  
 Morning Noon Evening Bedtime  
 albuterol 90 mcg/actuation inhaler Commonly known as:  PROVENTIL HFA, VENTOLIN HFA, PROAIR HFA Your last dose was: Your next dose is: Take 2 Puffs by inhalation every four (4) hours as needed for Wheezing. 2 Puff CeleXA 40 mg tablet Generic drug:  citalopram  
   
Your last dose was: Your next dose is: Take 40 mg by mouth daily. 40 mg  
    
   
   
   
  
 chlorpheniramine-HYDROcodone 10-8 mg/5 mL suspension Commonly known as:  Merrilyn Radish ER Your last dose was: Your next dose is: Take 5 mL by mouth every twelve (12) hours as needed for Cough. Max Daily Amount: 10 mL. 5 mL  
    
   
   
   
  
 divalproex  mg tablet Commonly known as:  DEPAKOTE Your last dose was: Your next dose is: Take 1 tablet by mouth two (2) times a day. 500 mg  
    
   
   
   
  
 docusate sodium 100 mg capsule Commonly known as:  Angel Kinnear Your last dose was: Your next dose is: Take 1 Cap by mouth two (2) times a day for 90 days.   
 100 mg  
    
   
   
   
  
 FLOMAX 0.4 mg capsule Generic drug:  tamsulosin Your last dose was: Your next dose is: Take 0.4 mg by mouth daily. 0.4 mg  
    
   
   
   
  
 FLONASE ALLERGY RELIEF 50 mcg/actuation nasal spray Generic drug:  fluticasone Your last dose was: Your next dose is: 2 Sprays by Both Nostrils route daily as needed for Rhinitis. 2 Spray  
    
   
   
   
  
 glipiZIDE 10 mg tablet Commonly known as:  Shena Sierra Your last dose was: Your next dose is: Take 1 Tab by mouth Before breakfast and dinner. 10 mg  
    
   
   
   
  
 lisinopril 20 mg tablet Commonly known as:  Alisson Herbert Your last dose was: Your next dose is: Take 20 mg by mouth two (2) times a day. 20 mg  
    
   
   
   
  
 megestrol 400 mg/10 mL (10 mL) suspension Commonly known as:  MEGACE Your last dose was: Your next dose is: Take 5 mL by mouth daily. 200 mg  
    
   
   
   
  
 multivitamin tablet Commonly known as:  ONE A DAY Your last dose was: Your next dose is: Take 1 Tab by mouth daily. 1 Tab NITROSTAT 0.4 mg SL tablet Generic drug:  nitroglycerin Your last dose was: Your next dose is: 0.4 mg by SubLINGual route every five (5) minutes as needed for Chest Pain. Up to 3 doses. 0.4 mg  
    
   
   
   
  
 oxybutynin 5 mg tablet Commonly known as:  JDGESDVF Your last dose was: Your next dose is: Take 5 mg by mouth nightly. 5 mg  
    
   
   
   
  
 oxyCODONE-acetaminophen 5-325 mg per tablet Commonly known as:  PERCOCET Your last dose was: Your next dose is: Take 1 Tab by mouth every four (4) hours as needed for Pain. Max Daily Amount: 6 Tabs. 1 Tab PROTONIX 40 mg tablet Generic drug:  pantoprazole Your last dose was: Your next dose is: Take 40 mg by mouth daily. 40 mg  
    
   
   
   
  
 VIAGRA 100 mg tablet Generic drug:  sildenafil citrate Your last dose was: Your next dose is: Take 100 mg by mouth as needed (\"To set the mood\"). 100 mg  
    
   
   
   
  
 XANAX 1 mg tablet Generic drug:  ALPRAZolam  
   
Your last dose was: Your next dose is: Take 1 mg by mouth four (4) times daily. 1 mg XARELTO 20 mg Tab tablet Generic drug:  rivaroxaban Your last dose was: Your next dose is: Take 20 mg by mouth daily. 20 mg Opioid Education Prescription Opioids: What You Need to Know: 
 
Prescription opioids can be used to help relieve moderate-to-severe pain and are often prescribed following a surgery or injury, or for certain health conditions. These medications can be an important part of treatment but also come with serious risks. Opioids are strong pain medicines. Examples include hydrocodone, oxycodone, fentanyl, and morphine. Heroin is an example of an illegal opioid. It is important to work with your health care provider to make sure you are getting the safest, most effective care. WHAT ARE THE RISKS AND SIDE EFFECTS OF OPIOID USE? Prescription opioids carry serious risks of addiction and overdose, especially with prolonged use. An opioid overdose, often marked by slow breathing, can cause sudden death. The use of prescription opioids can have a number of side effects as well, even when taken as directed. · Tolerance-meaning you might need to take more of a medication for the same pain relief · Physical dependence-meaning you have symptoms of withdrawal when the medication is stopped. Withdrawal symptoms can include nausea, sweating, chills, diarrhea, stomach cramps, and muscle aches.   Withdrawal can last up to several weeks, depending on which drug you took and how long you took it. · Increased sensitivity to pain · Constipation · Nausea, vomiting, and dry mouth · Sleepiness and dizziness · Confusion · Depression · Low levels of testosterone that can result in lower sex drive, energy, and strength · Itching and sweating RISKS ARE GREATER WITH:      
· History of drug misuse, substance use disorder, or overdose · Mental health conditions (such as depression or anxiety) · Sleep apnea · Older age (72 years or older) · Pregnancy Avoid alcohol while taking prescription opioids. Also, unless specifically advised by your health care provider, medications to avoid include: · Benzodiazepines (such as Xanax or Valium) · Muscle relaxants (such as Soma or Flexeril) · Hypnotics (such as Ambien or Lunesta) · Other prescription opioids KNOW YOUR OPTIONS Talk to your health care provider about ways to manage your pain that don't involve prescription opioids. Some of these options may actually work better and have fewer risks and side effects. Options may include: 
· Pain relievers such as acetaminophen, ibuprofen, and naproxen · Some medications that are also used for depression or seizures · Physical therapy and exercise · Counseling to help patients learn how to cope better with triggers of pain and stress. · Application of heat or cold compress · Massage therapy · Relaxation techniques Be Informed Make sure you know the name of your medication, how much and how often to take it, and its potential risks & side effects. IF YOU ARE PRESCRIBED OPIOIDS FOR PAIN: 
· Never take opioids in greater amounts or more often than prescribed. Remember the goal is not to be pain-free but to manage your pain at a tolerable level. · Follow up with your primary care provider to: · Work together to create a plan on how to manage your pain. · Talk about ways to help manage your pain that don't involve prescription opioids. · Talk about any and all concerns and side effects. · Help prevent misuse and abuse. · Never sell or share prescription opioids · Help prevent misuse and abuse. · Store prescription opioids in a secure place and out of reach of others (this may include visitors, children, friends, and family). · Safely dispose of unused/unwanted prescription opioids: Find your community drug take-back program or your pharmacy mail-back program, or flush them down the toilet, following guidance from the Food and Drug Administration (www.fda.gov/Drugs/ResourcesForYou). · Visit www.cdc.gov/drugoverdose to learn about the risks of opioid abuse and overdose. · If you believe you may be struggling with addiction, tell your health care provider and ask for guidance or call [employer] at 5-726-230-RRHC. Discharge Instructions Gene Alas CHRIS 
[id number] 
1955 EUS DISCHARGE INSTRUCTIONS Discomfort: 
Sore throat- throat lozenges or warm salt water gargle 
redness at IV site- apply warm compress to area; if redness or soreness persist- contact your physician Gaseous discomfort- walking, belching will help relieve any discomfort You may not operate a vehicle for 12 hours You may not engage in an occupation involving machinery or appliances for rest of today You may not drink alcoholic beverages for at least 12 hours Avoid making any critical decisions for at least 24 hour DIET You may resume your regular diet  however -  remember your colon is empty and a heavy meal will produce gas. Avoid these foods:  vegetables, fried / greasy foods, carbonated drinks MEDICATIONS: 
Per Medication Reconciliation If pt needs to go back on Xarelto can resume on 7/26 from GI standpoint ACTIVITY You may resume your normal daily activities until tomorrow AM; 
Spend the remainder of the day resting -  avoid any strenuous activity. CALL M.D. ANY SIGN OF Increasing pain, nausea, vomiting Abdominal distension (swelling) New increased bleeding (oral or rectal) Fever (chills) Pain in chest area Bloody discharge from nose or mouth Shortness of breath You may not take any Advil, Aspirin, Ibuprofen, Motrin, Aleve, or Goodys for 10 days, ONLY  Tylenol as needed for pain. IMPRESSION: 
Findings:  
 
Ultrasound: 
Under EUS guidance the celiac trunk was identified branching off the aorta. A 22 gauge needle was primed with 5 cc of saline. The needle was then advanced anterior to the celiac trunk in the region of the celiac plexus. 3 cc of of normal saline was used to then flush the needle. An aspiration test was performed and this was negative (ruled out vessel penetration). Subsequently 10 cc of 0.25% Bupivacaine was injected followed by 10 cc of 98% alcohol. The needle was then flushed with an additional 3 cc of normal saline prior to withdrawing the needle. Recommendations: 
1. Follow up with Oncology as planned Follow-up Instructions: 
 Telephone # [phone] Kaylee Fox MD 
 
  
 
  
  
  
Introducing [facility] SERVICES! Dear Ines Richey: Thank you for requesting a [employer] account. Our records indicate that you already have an active [employer] account. You can access your account anytime at [email or web address] Did you know that you can access your hospital and ER discharge instructions at any time in [employer]? You can also review all of your test results from your hospital stay or ER visit. Additional Information If you have questions, please visit the Frequently Asked Questions section of the [employer] website at [email or web address] Remember, [employer] is NOT to be used for urgent needs. For medical emergencies, dial 911. Now available from your iPhone and Android! Introducing [employer] As a [employer] patient, I wanted to make you aware of our electronic visit tool called [employer]. [employer] allows you to connect within minutes with a medical provider 24 hours a day, seven days a week via a mobile device or tablet or logging into a secure website from your computer. You can access [employer]za from anywhere in the [place]. A virtual visit might be right for you when you have a simple condition and feel like you just dont want to get out of bed, or cant get away from work for an appointment, when your regular [employer] provider is not available (evenings, weekends or holidays), or when youre out of town and need minor care. Electronic visits cost only $49 and if the [employer] provider determines a prescription is needed to treat your condition, one can be electronically transmitted to a nearby pharmacy*. Please take a moment to enroll today if you have not already done so. The enrollment process is free and takes just a few minutes. To enroll, please download the [employer] harry to your tablet or phone, or visit [email or web address] to enroll on your computer. And, as an [address] [place] [address] patient with a [employer] account, the results of your visits will be scanned into your electronic medical record and your primary care provider will be able to view the scanned results. We urge you to continue to see your regular [employer] provider for your ongoing medical care. And while your primary care provider may not be the one available when you seek a [employer] virtual visit, the peace of mind you get from getting a real diagnosis real time can be priceless. For more information on [employer], view our Frequently Asked Questions (FAQs) at [email or web address]. Sincerely, 
 
Nena Diaz MD 
Chief Medical Officer [employer] *:  certain medications cannot be prescribed via [employer] Providers Seen During Your Hospitalization Provider Specialty Primary office phone Mary Germain MD Gastroenterology [phone] Your Primary Care Physician (PCP) Primary Care Physician Office Phone Office Fax  
 Francisco Ugalde [phone] You are allergic to the following Allergen Reactions Pcn (Penicillins) Anaphylaxis Hives Itching Recent Documentation Height Weight BMI Smoking Status 1.702 m 101.8 kg 35.14 kg/m2 Former Smoker Emergency Contacts Name Discharge Info Relation Home Work Mobile [address] [facility] CAREGIVER [3] Child [2] [phone] [address] [facility] CAREGIVER [3] Child [2] [phone] Patient Belongings The following personal items are in your possession at time of discharge: 
  Dental Appliances: None  Visual Aid: Glasses Please provide this summary of care documentation to your next provider. Signatures-by signing, you are acknowledging that this After Visit Summary has been reviewed with you and you have received a copy. Patient Signature:  ____________________________________________________________ Date:  ____________________________________________________________  
  
Aloma Snellen Provider Signature:  ____________________________________________________________ Date:  ____________________________________________________________

## 2018-07-23 NOTE — ROUTINE PROCESS
Priya Jewels II  1955  [id number]    Situation:  Verbal report received from: Rocio Ellsworth RN  Procedure: Procedure(s):  ESOPHAGOGASTRODUODENOSCOPY (EGD) WITH PLEXUS BLOCK WITH ENDOSCOPIC ULTRASOUND GUIDANCE  ENDOSCOPIC ULTRASOUND (EUS)    Background:    Preoperative diagnosis: MASS OF PANCREAS  Postoperative diagnosis: metastatic pancreatic cancer    :  Dr. Apolonia Martinez  Assistant(s): Endoscopy Technician-1: Estefani Gaston  Endoscopy RN-1: Sandhya Gan RN    Specimens: * No specimens in log *  H. Pylori  no    Assessment:  Intra-procedure medications       Anesthesia gave intra-procedure sedation and medications, see anesthesia flow sheet yes    Intravenous fluids: NS@ KVO     Vital signs stable     Abdominal assessment: round and soft     Recommendation:  Discharge patient per MD order.     Family or Friend   Permission to share finding with family or friend yes

## 2018-07-23 NOTE — ANESTHESIA PREPROCEDURE EVALUATION
Anesthetic History   No history of anesthetic complications            Review of Systems / Medical History  Patient summary reviewed, nursing notes reviewed and pertinent labs reviewed    Pulmonary    COPD (2 L NC prn): moderate    Sleep apnea: CPAP           Neuro/Psych     seizures: well controlled  CVA  TIA, headaches (Migraines) and psychiatric history (depression)     Cardiovascular    Hypertension        Dysrhythmias : atrial fibrillation  CAD    Exercise tolerance: <4 METS  Comments: IHSS  EF 55-60%    7-2018 EKG:  ST, PACs, LAD   GI/Hepatic/Renal     GERD          Comments: Mass of Pancreas Endo/Other    Diabetes: type 2    Morbid obesity     Other Findings   Comments: Pancreatic mass         Physical Exam    Airway  Mallampati: II  TM Distance: 4 - 6 cm  Neck ROM: normal range of motion   Mouth opening: Normal     Cardiovascular  Regular rate and rhythm,  S1 and S2 normal,  no murmur, click, rub, or gallop             Dental  No notable dental hx       Pulmonary  Breath sounds clear to auscultation               Abdominal  GI exam deferred       Other Findings            Anesthetic Plan    ASA: 3  Anesthesia type: general and total IV anesthesia          Induction: Intravenous  Anesthetic plan and risks discussed with: Patient

## 2018-07-23 NOTE — PROGRESS NOTES
Anesthesia reports 70 mg Propofol, 100 mg Lidocaine and 1400 ml of Normal Saline, 0.1 mg Robinul, 50 mcg Fentanyl, 2 mg Versed were given during procedure. Received report from anesthesia staff on vital signs and status of patient.

## 2018-07-23 NOTE — PROGRESS NOTES
Endoscope was pre-cleaned at the bedside immediately following procedure by [facility] and Marshall Dotson.

## 2018-07-23 NOTE — PROCEDURES
NAME:  Mikael Peck II   :   1955   MRN:   [id number]     Benito Botello [facility]    Date/Time:  2018   Procedure Type: Linear EUS with Celiac Plexus Neurolysis    Indications: Metastatic pancreatic cancer with abdominal pain    Pre-operative Diagnosis: see indication above    Post-operative Diagnosis:  See findings below    : Gabi Hawthorne MD    Referring Provider: -Kirby Chatman MD-Caroline Guillen MD    Procedure Details:    Exam:  Airway: clear, no airway problems anticipated  Heart: RRR, without gallops or rubs  Lungs: clear bilaterally without wheezes, crackles, or rhonchi  Abdomen: soft, nontender, nondistended, bowel sounds present  Mental Status: awake, alert and oriented to person, place and time     Anethesia/Sedation:  MAC anesthesia Propofol      Procedure Details   After infom consent was obtained for the procedure, with all risks and benefits of procedure explained the patient was taken to the endoscopy suite and placed in the left lateral decubitus position. Following sequential administration of sedation as per above, the linear echoendoscope was inserted into the mouth and advanced under direct vision to stomach. A careful inspection was made as the gastroscope was withdrawn, including a retroflexed view of the proximal stomach; findings and interventions are described below. Findings:     Ultrasound:  Under EUS guidance the celiac trunk was identified branching off the aorta. A 22 gauge needle was primed with 5 cc of saline. The needle was then advanced anterior to the celiac trunk in the region of the celiac plexus. 3 cc of of normal saline was used to then flush the needle. An aspiration test was performed and this was negative (ruled out vessel penetration). Subsequently 10 cc of 0.25% Bupivacaine was injected followed by 10 cc of 98% alcohol. The needle was then flushed with an additional 3 cc of normal saline prior to withdrawing the needle.      Specimen Removed: None    Complications: None. EBL:  None. Recommendations:  1.  Follow up with Oncology as planned    Atiya Tao MD

## 2018-07-23 NOTE — ANESTHESIA POSTPROCEDURE EVALUATION
Post-Anesthesia Evaluation and Assessment    Patient: Howard Alvarenga MRN: [id number]  SSN: [id number]    YOB: 1955  Age: 63 y.o. Sex: male       Cardiovascular Function/Vital Signs  Visit Vitals    BP (!) 142/99    Pulse 93    Temp 37 °C (98.6 °F)    Resp 12    Ht 5' 7\" (1.702 m)    Wt 101.8 kg (224 lb 6 oz)    SpO2 100%    BMI 35.14 kg/m2       Patient is status post total IV anesthesia anesthesia for Procedure(s):  ENDOSCOPIC ULTRASOUND (EUS)  INJECTION (CELIAC PLEXUS BLOCK). Nausea/Vomiting: None    Postoperative hydration reviewed and adequate. Pain:  Pain Scale 1: Visual (07/23/18 1248)  Pain Intensity 1: 0 (07/23/18 1248)   Managed    Neurological Status: At baseline    Mental Status and Level of Consciousness: Arousable    Pulmonary Status:   O2 Device: Room air (07/23/18 1248)   Adequate oxygenation and airway patent    Complications related to anesthesia: None    Post-anesthesia assessment completed.  No concerns    Signed By: Vish May MD     July 23, 2018

## 2018-08-28 ENCOUNTER — APPOINTMENT (OUTPATIENT)
Dept: GENERAL RADIOLOGY | Age: 63
End: 2018-08-28
Attending: EMERGENCY MEDICINE
Payer: MEDICARE

## 2018-08-28 ENCOUNTER — HOSPITAL ENCOUNTER (EMERGENCY)
Age: 63
Discharge: HOME | End: 2018-08-28
Attending: EMERGENCY MEDICINE | Admitting: EMERGENCY MEDICINE
Payer: MEDICARE

## 2018-08-28 VITALS
HEIGHT: 67 IN | HEART RATE: 89 BPM | TEMPERATURE: 97.7 F | WEIGHT: 232 LBS | DIASTOLIC BLOOD PRESSURE: 83 MMHG | BODY MASS INDEX: 36.41 KG/M2 | SYSTOLIC BLOOD PRESSURE: 124 MMHG | RESPIRATION RATE: 18 BRPM | OXYGEN SATURATION: 100 %

## 2018-08-28 DIAGNOSIS — K59.00 CONSTIPATION, UNSPECIFIED CONSTIPATION TYPE: ICD-10-CM

## 2018-08-28 DIAGNOSIS — E86.0 DEHYDRATION: ICD-10-CM

## 2018-08-28 DIAGNOSIS — R10.84 ABDOMINAL PAIN, GENERALIZED: Primary | ICD-10-CM

## 2018-08-28 LAB
ALBUMIN SERPL-MCNC: 2.5 G/DL (ref 3.5–5)
ALBUMIN/GLOB SERPL: 0.5 {RATIO} (ref 1.1–2.2)
ALP SERPL-CCNC: 483 U/L (ref 45–117)
ALT SERPL-CCNC: 35 U/L (ref 12–78)
ANION GAP SERPL CALC-SCNC: 4 MMOL/L (ref 5–15)
AST SERPL-CCNC: 63 U/L (ref 15–37)
BASOPHILS # BLD: 0 K/UL (ref 0–0.1)
BASOPHILS NFR BLD: 0 % (ref 0–1)
BILIRUB SERPL-MCNC: 1.1 MG/DL (ref 0.2–1)
BUN SERPL-MCNC: 22 MG/DL (ref 6–20)
BUN/CREAT SERPL: 16 (ref 12–20)
CALCIUM SERPL-MCNC: 8.7 MG/DL (ref 8.5–10.1)
CHLORIDE SERPL-SCNC: 100 MMOL/L (ref 97–108)
CO2 SERPL-SCNC: 33 MMOL/L (ref 21–32)
CREAT SERPL-MCNC: 1.38 MG/DL (ref 0.7–1.3)
DIFFERENTIAL METHOD BLD: ABNORMAL
EOSINOPHIL # BLD: 0.1 K/UL (ref 0–0.4)
EOSINOPHIL NFR BLD: 1 % (ref 0–7)
ERYTHROCYTE [DISTWIDTH] IN BLOOD BY AUTOMATED COUNT: 17.2 % (ref 11.5–14.5)
GLOBULIN SER CALC-MCNC: 4.6 G/DL (ref 2–4)
GLUCOSE SERPL-MCNC: 170 MG/DL (ref 65–100)
HCT VFR BLD AUTO: 42.3 % (ref 36.6–50.3)
HGB BLD-MCNC: 13.9 G/DL (ref 12.1–17)
IMM GRANULOCYTES # BLD: 0 K/UL (ref 0–0.04)
IMM GRANULOCYTES NFR BLD AUTO: 0 % (ref 0–0.5)
LACTATE SERPL-SCNC: 1.8 MMOL/L (ref 0.4–2)
LIPASE SERPL-CCNC: 115 U/L (ref 73–393)
LYMPHOCYTES # BLD: 1 K/UL (ref 0.8–3.5)
LYMPHOCYTES NFR BLD: 11 % (ref 12–49)
MCH RBC QN AUTO: 23.2 PG (ref 26–34)
MCHC RBC AUTO-ENTMCNC: 32.9 G/DL (ref 30–36.5)
MCV RBC AUTO: 70.7 FL (ref 80–99)
MONOCYTES # BLD: 0.6 K/UL (ref 0–1)
MONOCYTES NFR BLD: 7 % (ref 5–13)
NEUTS SEG # BLD: 7.5 K/UL (ref 1.8–8)
NEUTS SEG NFR BLD: 81 % (ref 32–75)
NRBC # BLD: 0.07 K/UL (ref 0–0.01)
NRBC BLD-RTO: 0.8 PER 100 WBC
PLATELET # BLD AUTO: 161 K/UL (ref 150–400)
POTASSIUM SERPL-SCNC: 4 MMOL/L (ref 3.5–5.1)
PROT SERPL-MCNC: 7.1 G/DL (ref 6.4–8.2)
RBC # BLD AUTO: 5.98 M/UL (ref 4.1–5.7)
RBC MORPH BLD: ABNORMAL
RBC MORPH BLD: ABNORMAL
SODIUM SERPL-SCNC: 137 MMOL/L (ref 136–145)
WBC # BLD AUTO: 9.2 K/UL (ref 4.1–11.1)

## 2018-08-28 PROCEDURE — 36415 COLL VENOUS BLD VENIPUNCTURE: CPT | Performed by: EMERGENCY MEDICINE

## 2018-08-28 PROCEDURE — 99285 EMERGENCY DEPT VISIT HI MDM: CPT

## 2018-08-28 PROCEDURE — 80053 COMPREHEN METABOLIC PANEL: CPT | Performed by: EMERGENCY MEDICINE

## 2018-08-28 PROCEDURE — 74019 RADEX ABDOMEN 2 VIEWS: CPT

## 2018-08-28 PROCEDURE — 85025 COMPLETE CBC W/AUTO DIFF WBC: CPT | Performed by: EMERGENCY MEDICINE

## 2018-08-28 PROCEDURE — 74011250636 HC RX REV CODE- 250/636: Performed by: EMERGENCY MEDICINE

## 2018-08-28 PROCEDURE — 83690 ASSAY OF LIPASE: CPT | Performed by: EMERGENCY MEDICINE

## 2018-08-28 PROCEDURE — 96361 HYDRATE IV INFUSION ADD-ON: CPT

## 2018-08-28 PROCEDURE — 99284 EMERGENCY DEPT VISIT MOD MDM: CPT

## 2018-08-28 PROCEDURE — 83605 ASSAY OF LACTIC ACID: CPT | Performed by: EMERGENCY MEDICINE

## 2018-08-28 PROCEDURE — 96375 TX/PRO/DX INJ NEW DRUG ADDON: CPT

## 2018-08-28 PROCEDURE — 96374 THER/PROPH/DIAG INJ IV PUSH: CPT

## 2018-08-28 RX ORDER — MORPHINE SULFATE 2 MG/ML
4 INJECTION, SOLUTION INTRAMUSCULAR; INTRAVENOUS ONCE
Status: COMPLETED | OUTPATIENT
Start: 2018-08-28 | End: 2018-08-28

## 2018-08-28 RX ORDER — ONDANSETRON 2 MG/ML
4 INJECTION INTRAMUSCULAR; INTRAVENOUS
Status: COMPLETED | OUTPATIENT
Start: 2018-08-28 | End: 2018-08-28

## 2018-08-28 RX ADMIN — ONDANSETRON HYDROCHLORIDE 4 MG: 2 INJECTION, SOLUTION INTRAMUSCULAR; INTRAVENOUS at 07:29

## 2018-08-28 RX ADMIN — SODIUM CHLORIDE 1000 ML: 900 INJECTION, SOLUTION INTRAVENOUS at 07:29

## 2018-08-28 RX ADMIN — MORPHINE SULFATE 4 MG: 2 INJECTION, SOLUTION INTRAMUSCULAR; INTRAVENOUS at 07:29

## 2018-08-28 NOTE — ED NOTES
Patient  given copy of dc instructions and 0 paper script(s) and 0 electronic scripts. Patient  verbalized understanding of instructions and script (s). Patient given a current medication reconciliation form and verbalized understanding of their medications. Patient  verbalized understanding of the importance of discussing medications with  his or her physician or clinic they will be following up with. Patient alert and oriented and in no acute distress. Patient offered wheelchair from treatment area to hospital entrance, patient declined wheelchair. PT is going to try and use a can with assistance to ambulate to waiting room. A cab was called for the patient to be taken to [place] [address] for his outpatient MRI appointment. Pt states he has a ride home when his appointment is over.

## 2018-08-28 NOTE — ED TRIAGE NOTES
Patient presents to the ED via EMS with c/o right upper quadrant pain and vomiting. Pt has a hx of stage 4 pancreatic cancer, COPD, and diabetes. Pt reports using a walker at home to ambulate. Pt reports falling today while using his walker to get to the EMS stretcher and has an abrasion on his left arm. Pt reports he is followed at [facility] and requested to go there but EMS brought him here. Pt reports having an appointment for an MRI today at 1pm at [facility] and reported that the oncologist wants to see if the mass is getting larger and spreading and that is causing the pain in his abdomen. Pt reports having a port to the right side of his chest.    Pt is alert and oriented. Pt skin is warm and dry. Pt has abrasion to left arm. Pt was placed on 2L oxygen via nasal cannula. Pt reports wearing oxygen at home. Emergency Department Nursing Plan of Care       The Nursing Plan of Care is developed from the Nursing assessment and Emergency Department Attending provider initial evaluation. The plan of care may be reviewed in the ED Provider note.     The Plan of Care was developed with the following considerations:   Patient / Family readiness to learn indicated by:verbalized understanding  Persons(s) to be included in education: patient  Barriers to Learning/Limitations:No    Signed     Nicole Last    8/28/2018   7:15 AM

## 2018-08-28 NOTE — DISCHARGE INSTRUCTIONS
Abdominal Pain: Care Instructions  Your Care Instructions    Abdominal pain has many possible causes. Some aren't serious and get better on their own in a few days. Others need more testing and treatment. If your pain continues or gets worse, you need to be rechecked and may need more tests to find out what is wrong. You may need surgery to correct the problem. Don't ignore new symptoms, such as fever, nausea and vomiting, urination problems, pain that gets worse, and dizziness. These may be signs of a more serious problem. Your doctor may have recommended a follow-up visit in the next 8 to 12 hours. If you are not getting better, you may need more tests or treatment. The doctor has checked you carefully, but problems can develop later. If you notice any problems or new symptoms, get medical treatment right away. Follow-up care is a key part of your treatment and safety. Be sure to make and go to all appointments, and call your doctor if you are having problems. It's also a good idea to know your test results and keep a list of the medicines you take. How can you care for yourself at home? · Rest until you feel better. · To prevent dehydration, drink plenty of fluids, enough so that your urine is light yellow or clear like water. Choose water and other caffeine-free clear liquids until you feel better. If you have kidney, heart, or liver disease and have to limit fluids, talk with your doctor before you increase the amount of fluids you drink. · If your stomach is upset, eat mild foods, such as rice, dry toast or crackers, bananas, and applesauce. Try eating several small meals instead of two or three large ones. · Wait until 48 hours after all symptoms have gone away before you have spicy foods, alcohol, and drinks that contain caffeine. · Do not eat foods that are high in fat. · Avoid anti-inflammatory medicines such as aspirin, ibuprofen (Advil, Motrin), and naproxen (Aleve).  These can cause stomach upset. Talk to your doctor if you take daily aspirin for another health problem. When should you call for help? Call 911 anytime you think you may need emergency care. For example, call if:    · You passed out (lost consciousness).     · You pass maroon or very bloody stools.     · You vomit blood or what looks like coffee grounds.     · You have new, severe belly pain.    Call your doctor now or seek immediate medical care if:    · Your pain gets worse, especially if it becomes focused in one area of your belly.     · You have a new or higher fever.     · Your stools are black and look like tar, or they have streaks of blood.     · You have unexpected vaginal bleeding.     · You have symptoms of a urinary tract infection. These may include:  ¨ Pain when you urinate. ¨ Urinating more often than usual.  ¨ Blood in your urine.     · You are dizzy or lightheaded, or you feel like you may faint.    Watch closely for changes in your health, and be sure to contact your doctor if:    · You are not getting better after 1 day (24 hours). Where can you learn more? Go to http://deanAravsergio.info/. Enter K267 in the search box to learn more about \"Abdominal Pain: Care Instructions. \"  Current as of: November 20, 2017  Content Version: 11.7  © 1738-9130 [employer]. Care instructions adapted under license by [employer] (which disclaims liability or warranty for this information). If you have questions about a medical condition or this instruction, always ask your healthcare professional. [place] [address] any warranty or liability for your use of this information. Constipation: Care Instructions  Your Care Instructions    Constipation means that you have a hard time passing stools (bowel movements). People pass stools from 3 times a day to once every 3 days. What is normal for you may be different.  Constipation may occur with pain in the rectum and cramping. The pain may get worse when you try to pass stools. Sometimes there are small amounts of bright red blood on toilet paper or the surface of stools. This is because of enlarged veins near the rectum (hemorrhoids). A few changes in your diet and lifestyle may help you avoid ongoing constipation. Your doctor may also prescribe medicine to help loosen your stool. Some medicines can cause constipation. These include pain medicines and antidepressants. Tell your doctor about all the medicines you take. Your doctor may want to make a medicine change to ease your symptoms. Follow-up care is a key part of your treatment and safety. Be sure to make and go to all appointments, and call your doctor if you are having problems. It's also a good idea to know your test results and keep a list of the medicines you take. How can you care for yourself at home? · Drink plenty of fluids, enough so that your urine is light yellow or clear like water. If you have kidney, heart, or liver disease and have to limit fluids, talk with your doctor before you increase the amount of fluids you drink. · Include high-fiber foods in your diet each day. These include fruits, vegetables, beans, and whole grains. · Get at least 30 minutes of exercise on most days of the week. Walking is a good choice. You also may want to do other activities, such as running, swimming, cycling, or playing tennis or team sports. · Take a fiber supplement, such as Citrucel or Metamucil, every day. Read and follow all instructions on the label. · Schedule time each day for a bowel movement. A daily routine may help. Take your time having your bowel movement. · Support your feet with a small step stool when you sit on the toilet. This helps flex your hips and places your pelvis in a squatting position. · Your doctor may recommend an over-the-counter laxative to relieve your constipation. Examples are Milk of Magnesia and MiraLax.  Read and follow all instructions on the label. Do not use laxatives on a long-term basis. When should you call for help? Call your doctor now or seek immediate medical care if:    · You have new or worse belly pain.     · You have new or worse nausea or vomiting.     · You have blood in your stools.    Watch closely for changes in your health, and be sure to contact your doctor if:    · Your constipation is getting worse.     · You do not get better as expected. Where can you learn more? Go to http://dean-sergio.info/. Enter 21 [id number] in the search box to learn more about \"Constipation: Care Instructions. \"  Current as of: November 20, 2017  Content Version: 11.7  © 1130-1887 [employer]. Care instructions adapted under license by [employer] (which disclaims liability or warranty for this information). If you have questions about a medical condition or this instruction, always ask your healthcare professional. [place] [address] any warranty or liability for your use of this information. Dehydration: Care Instructions  Your Care Instructions  Dehydration happens when your body loses too much fluid. This might happen when you do not drink enough water or you lose large amounts of fluids from your body because of diarrhea, vomiting, or sweating. Severe dehydration can be life-threatening. Water and minerals called electrolytes help put your body fluids back in balance. Learn the early signs of fluid loss, and drink more fluids to prevent dehydration. Follow-up care is a key part of your treatment and safety. Be sure to make and go to all appointments, and call your doctor if you are having problems. It's also a good idea to know your test results and keep a list of the medicines you take. How can you care for yourself at home? · To prevent dehydration, drink plenty of fluids, enough so that your urine is light yellow or clear like water.  Choose water and other caffeine-free clear liquids until you feel better. If you have kidney, heart, or liver disease and have to limit fluids, talk with your doctor before you increase the amount of fluids you drink. · If you do not feel like eating or drinking, try taking small sips of water, sports drinks, or other rehydration drinks. · Get plenty of rest.  To prevent dehydration  · Add more fluids to your diet and daily routine, unless your doctor has told you not to. · During hot weather, drink more fluids. Drink even more fluids if you exercise a lot. Stay away from drinks with alcohol or caffeine. · Watch for the symptoms of dehydration. These include:  ¨ A dry, sticky mouth. ¨ Dark yellow urine, and not much of it. ¨ Dry and sunken eyes. ¨ Feeling very tired. · Learn what problems can lead to dehydration. These include:  ¨ Diarrhea, fever, and vomiting. ¨ Any illness with a fever, such as pneumonia or the flu. ¨ Activities that cause heavy sweating, such as endurance races and heavy outdoor work in hot or humid weather. ¨ Alcohol or drug abuse or withdrawal.  ¨ Certain medicines, such as cold and allergy pills (antihistamines), diet pills (diuretics), and laxatives. ¨ Certain diseases, such as diabetes, cancer, and heart or kidney disease. When should you call for help? Call 911 anytime you think you may need emergency care. For example, call if:    · You passed out (lost consciousness).    Call your doctor now or seek immediate medical care if:    · You are confused and cannot think clearly.     · You are dizzy or lightheaded, or you feel like you may faint.     · You have signs of needing more fluids.  You have sunken eyes and a dry mouth, and you pass only a little dark urine.     · You cannot keep fluids down.    Watch closely for changes in your health, and be sure to contact your doctor if:    · You are not making tears.     · Your skin is very dry and sags slowly back into place after you pinch it.     · Your mouth and eyes are very dry. Where can you learn more? Go to http://dean-sergio.info/. Enter G589 in the search box to learn more about \"Dehydration: Care Instructions. \"  Current as of: November 20, 2017  Content Version: 11.7  © 7540-3259 [employer]. Care instructions adapted under license by [email or web address] (which disclaims liability or warranty for this information). If you have questions about a medical condition or this instruction, always ask your healthcare professional. [address] any warranty or liability for your use of this information. Pancreatic Cancer: Care Instructions  Your Care Instructions    Pancreatic cancer occurs when abnormal cells grow out of control in the pancreas. Your pancreas is in your belly, behind your stomach. It makes juices that help your body digest food. It also makes insulin, which helps control your blood sugar level. You may have a combination of treatments, depending on how advanced your cancer is. You may have surgery to take out part or all of your pancreas. Also, you may need radiation treatments or may take medicines that destroy cancer cells (chemotherapy). You may need to take medicines to help you digest food and control your blood sugar. If the cancer causes pain, your doctor will give you medicine or other treatment to help you be more comfortable. When you find out that you have cancer, you may feel many emotions and may need some help coping. Seek out family, friends, and counselors for support. You also can do things at home to make yourself feel better while you go through treatment. Call the [employer] (2-581.740.1866) or visit its website at [address] for more information. Follow-up care is a key part of your treatment and safety. Be sure to make and go to all appointments, and call your doctor if you are having problems.  It's also a good idea to know your test results and keep a list of the medicines you take. How can you care for yourself at home? · Take your medicines exactly as prescribed. Call your doctor if you think you are having a problem with your medicine. You may get medicine for nausea and vomiting if you have these side effects. · Eat healthy food. If you do not feel like eating, try to eat food that has protein and extra calories to keep up your strength and prevent weight loss. Drink liquid meal replacements for extra calories and protein. Try to eat your main meal early. · Get some physical activity every day, but do not get too tired. Keep doing the hobbies you enjoy as your energy allows. · Take steps to control your stress and workload. Learn relaxation techniques. ¨ Share your feelings. Stress and tension affect our emotions. By expressing your feelings to others, you may be able to understand and cope with them. ¨ Consider joining a support group. Talking about a problem with your spouse, a good friend, or other people with similar problems is a good way to reduce tension and stress. ¨ Express yourself through art. Try writing, crafts, dance, or art to relieve stress. Some dance, writing, or art groups may be available just for people who have cancer. ¨ Be kind to your body and mind. Getting enough sleep, eating a healthy diet, and taking time to do things you enjoy can contribute to an overall feeling of balance in your life and help reduce stress. ¨ Get help if you need it. Discuss your concerns with your doctor or counselor. · If you are vomiting or have diarrhea:  ¨ Drink plenty of fluids (enough so that your urine is light yellow or clear like water) to prevent dehydration. Choose water and other caffeine-free clear liquids. If you have kidney, heart, or liver disease and have to limit fluids, talk with your doctor before you increase the amount of fluids you drink.   ¨ When you are able to eat, try clear soups, mild foods, and liquids until all symptoms are gone for 12 to 48 hours. Other good choices include dry toast, crackers, cooked cereal, and gelatin dessert, such as Jell-O.  · If you have not already done so, prepare a list of advance directives. Advance directives are instructions to your doctor and family members about what kind of care you want if you become unable to speak or express yourself. When should you call for help? Call 911 anytime you think you may need emergency care. For example, call if:    · You passed out (lost consciousness).    Call your doctor now or seek immediate medical care if:    · You have a fever.     · You have abnormal bleeding.     · You have new or worse pain.     · You think you have an infection.     · You have new symptoms, such as a cough, belly pain, vomiting, diarrhea, or a rash.    Watch closely for changes in your health, and be sure to contact your doctor if:    · You are much more tired than usual.     · You have swollen glands in your armpits, groin, or neck.     · You do not get better as expected. Where can you learn more? Go to http://dean-sergio.info/. Enter I636 in the search box to learn more about \"Pancreatic Cancer: Care Instructions. \"  Current as of: May 12, 2017  Content Version: 11.7  © 8835-0815 [employer]. Care instructions adapted under license by [employer] (which disclaims liability or warranty for this information). If you have questions about a medical condition or this instruction, always ask your healthcare professional. [address] any warranty or liability for your use of this information.

## (undated) DEVICE — Device: Brand: BALLOON3

## (undated) DEVICE — Device

## (undated) DEVICE — SYR 3ML LL TIP 1/10ML GRAD --

## (undated) DEVICE — SYR 10ML LUER LOK 1/5ML GRAD --

## (undated) DEVICE — NEONATAL-ADULT SPO2 SENSOR: Brand: NELLCOR

## (undated) DEVICE — KENDALL RADIOLUCENT FOAM MONITORING ELECTRODE RECTANGULAR SHAPE: Brand: KENDALL

## (undated) DEVICE — CATH IV AUTOGRD BC PNK 20GA 25 -- INSYTE

## (undated) DEVICE — 1200 GUARD II KIT W/5MM TUBE W/O VAC TUBE: Brand: GUARDIAN

## (undated) DEVICE — MEDI-VAC YANK SUCT HNDL W/TPRD BULBOUS TIP: Brand: CARDINAL HEALTH

## (undated) DEVICE — SET ADMIN 16ML TBNG L100IN 2 Y INJ SITE IV PIGGY BK DISP

## (undated) DEVICE — CATH IV AUTOGRD BC BLU 22GA 25 -- INSYTE

## (undated) DEVICE — ENDOSCOPIC ULTRASOUND ASPIRATION NEEDLE: Brand: EXPECT

## (undated) DEVICE — BLOCK BITE ENDOSCP AD 21 MM W/ DIL BLU LF DISP

## (undated) DEVICE — TOWEL 4 PLY TISS 19X30 SUE WHT

## (undated) DEVICE — NEEDLE HYPO 18GA L1.5IN PNK S STL HUB POLYPR SHLD REG BVL

## (undated) DEVICE — Z DISCONTINUED PER MEDLINE LINE GAS SAMPLING O2/CO2 LNG AD 13 FT NSL W/ TBNG FILTERLINE

## (undated) DEVICE — Device: Brand: SINGLE USE ASPIRATION NEEDLE

## (undated) DEVICE — BASIN EMSIS 16OZ GRAPHITE PLAS KID SHP MOLD GRAD FOR ORAL

## (undated) DEVICE — SOLIDIFIER MEDC 1200ML -- CONVERT TO 356117

## (undated) DEVICE — STAIN INDIA INK IN NACL 10ML --